# Patient Record
Sex: MALE | Race: ASIAN | NOT HISPANIC OR LATINO | Employment: OTHER | URBAN - METROPOLITAN AREA
[De-identification: names, ages, dates, MRNs, and addresses within clinical notes are randomized per-mention and may not be internally consistent; named-entity substitution may affect disease eponyms.]

---

## 2023-03-10 ENCOUNTER — APPOINTMENT (OUTPATIENT)
Dept: LAB | Facility: LAB | Age: 83
End: 2023-03-10
Payer: MEDICARE

## 2023-03-11 LAB
ALBUMIN (G/DL) IN SER/PLAS: 4.2 G/DL (ref 3.4–5)
ALBUMIN (MG/L) IN URINE: 19.1 MG/L
ALBUMIN/CREATININE (UG/MG) IN URINE: 36.7 UG/MG CRT (ref 0–30)
ANION GAP IN SER/PLAS: 12 MMOL/L (ref 10–20)
APPEARANCE, URINE: CLEAR
BILIRUBIN, URINE: NEGATIVE
BLOOD, URINE: NEGATIVE
CALCIUM (MG/DL) IN SER/PLAS: 9.2 MG/DL (ref 8.6–10.6)
CARBON DIOXIDE, TOTAL (MMOL/L) IN SER/PLAS: 27 MMOL/L (ref 21–32)
CHLORIDE (MMOL/L) IN SER/PLAS: 107 MMOL/L (ref 98–107)
COLOR, URINE: NORMAL
CREATININE (MG/DL) IN SER/PLAS: 1.47 MG/DL (ref 0.5–1.3)
CREATININE (MG/DL) IN URINE: 52.1 MG/DL (ref 20–370)
CREATININE (MG/DL) IN URINE: 52.1 MG/DL (ref 20–370)
ERYTHROCYTE DISTRIBUTION WIDTH (RATIO) BY AUTOMATED COUNT: 13.2 % (ref 11.5–14.5)
ERYTHROCYTE MEAN CORPUSCULAR HEMOGLOBIN CONCENTRATION (G/DL) BY AUTOMATED: 32.2 G/DL (ref 32–36)
ERYTHROCYTE MEAN CORPUSCULAR VOLUME (FL) BY AUTOMATED COUNT: 92 FL (ref 80–100)
ERYTHROCYTES (10*6/UL) IN BLOOD BY AUTOMATED COUNT: 3.56 X10E12/L (ref 4.5–5.9)
GFR MALE: 47 ML/MIN/1.73M2
GLUCOSE (MG/DL) IN SER/PLAS: 86 MG/DL (ref 74–99)
GLUCOSE, URINE: NEGATIVE MG/DL
HEMATOCRIT (%) IN BLOOD BY AUTOMATED COUNT: 32.9 % (ref 41–52)
HEMOGLOBIN (G/DL) IN BLOOD: 10.6 G/DL (ref 13.5–17.5)
KETONES, URINE: NEGATIVE MG/DL
LEUKOCYTE ESTERASE, URINE: NEGATIVE
LEUKOCYTES (10*3/UL) IN BLOOD BY AUTOMATED COUNT: 5.3 X10E9/L (ref 4.4–11.3)
NITRITE, URINE: NEGATIVE
NRBC (PER 100 WBCS) BY AUTOMATED COUNT: 0 /100 WBC (ref 0–0)
PH, URINE: 5 (ref 5–8)
PHOSPHATE (MG/DL) IN SER/PLAS: 3.4 MG/DL (ref 2.5–4.9)
PLATELETS (10*3/UL) IN BLOOD AUTOMATED COUNT: 167 X10E9/L (ref 150–450)
POTASSIUM (MMOL/L) IN SER/PLAS: 5.4 MMOL/L (ref 3.5–5.3)
PROTEIN (MG/DL) IN URINE: 7 MG/DL (ref 5–25)
PROTEIN, URINE: NEGATIVE MG/DL
PROTEIN/CREATININE (MG/MG) IN URINE: 0.13 MG/MG CREAT (ref 0–0.17)
SODIUM (MMOL/L) IN SER/PLAS: 141 MMOL/L (ref 136–145)
SPECIFIC GRAVITY, URINE: 1.01 (ref 1–1.03)
UREA NITROGEN (MG/DL) IN SER/PLAS: 28 MG/DL (ref 6–23)
UROBILINOGEN, URINE: <2 MG/DL (ref 0–1.9)

## 2023-03-12 LAB
IMMUNOGLOBULIN LIGHT CHAINS KAPPA/LAMBDA (MASS RATIO) IN SERUM: 1.8 (ref 0.26–1.65)
IMMUNOGLOBULIN LIGHT CHAINS.KAPPA (MG/DL) IN SERUM: 4.86 MG/DL (ref 0.33–1.94)
IMMUNOGLOBULIN LIGHT CHAINS.LAMBDA (MG/DL) IN SERUM: 2.7 MG/DL (ref 0.57–2.63)

## 2023-03-15 LAB
ALBUMIN ELP: 4.1 G/DL (ref 3.4–5)
ALPHA 1: 0.3 G/DL (ref 0.2–0.6)
ALPHA 2: 0.7 G/DL (ref 0.4–1.1)
BETA: 0.6 G/DL (ref 0.5–1.2)
GAMMA GLOBULIN: 1.1 G/DL (ref 0.5–1.4)
PATH REVIEW - SERUM IMMUNOFIXATION: NORMAL
PATH REVIEW-SERUM PROTEIN ELECTROPHORESIS: NORMAL
PROTEIN ELECTROPHORESIS INTERPRETATION: NORMAL
PROTEIN TOTAL: 6.8 G/DL (ref 6.4–8.2)
SERUM IMMUNOFIXATION INTERPRETATION: NORMAL

## 2023-03-20 RX ORDER — NEBIVOLOL 5 MG/1
0.5 TABLET ORAL DAILY
COMMUNITY
Start: 2014-10-09 | End: 2023-10-05 | Stop reason: ALTCHOICE

## 2023-03-20 RX ORDER — ROSUVASTATIN CALCIUM 10 MG/1
TABLET, COATED ORAL
COMMUNITY
Start: 2016-08-22 | End: 2023-10-02 | Stop reason: SINTOL

## 2023-03-20 RX ORDER — BUTALBITAL, ACETAMINOPHEN AND CAFFEINE 50; 325; 40 MG/1; MG/1; MG/1
1 TABLET ORAL EVERY 4 HOURS PRN
COMMUNITY
Start: 2014-03-05 | End: 2023-07-11

## 2023-03-20 RX ORDER — HYDROCORTISONE 25 MG/G
CREAM TOPICAL
COMMUNITY
Start: 2022-03-02

## 2023-03-20 RX ORDER — TAMSULOSIN HYDROCHLORIDE 0.4 MG/1
1 CAPSULE ORAL DAILY
COMMUNITY
Start: 2019-03-12 | End: 2023-06-01 | Stop reason: SDUPTHER

## 2023-03-20 RX ORDER — PREGABALIN 25 MG/1
CAPSULE ORAL
COMMUNITY
Start: 2022-09-13 | End: 2023-10-02

## 2023-03-20 RX ORDER — BUTALBITAL, ACETAMINOPHEN AND CAFFEINE 300; 40; 50 MG/1; MG/1; MG/1
1 CAPSULE ORAL 2 TIMES DAILY
COMMUNITY
Start: 2023-02-28 | End: 2023-09-07 | Stop reason: SDUPTHER

## 2023-03-20 RX ORDER — ICOSAPENT ETHYL 1 G/1
CAPSULE ORAL
COMMUNITY
Start: 2017-10-16 | End: 2023-10-02 | Stop reason: SDUPTHER

## 2023-03-20 RX ORDER — PANTOPRAZOLE SODIUM 20 MG/1
1 TABLET, DELAYED RELEASE ORAL DAILY
COMMUNITY
Start: 2022-12-22

## 2023-03-20 RX ORDER — AZELASTINE 1 MG/ML
2 SPRAY, METERED NASAL 2 TIMES DAILY
COMMUNITY
Start: 2020-10-08

## 2023-03-20 RX ORDER — CLOPIDOGREL BISULFATE 75 MG/1
1 TABLET ORAL DAILY
COMMUNITY
Start: 2022-12-16

## 2023-04-10 LAB
ALBUMIN (G/DL) IN SER/PLAS: 4 G/DL (ref 3.4–5)
ANION GAP IN SER/PLAS: 12 MMOL/L (ref 10–20)
CALCIUM (MG/DL) IN SER/PLAS: 9.2 MG/DL (ref 8.6–10.3)
CARBON DIOXIDE, TOTAL (MMOL/L) IN SER/PLAS: 25 MMOL/L (ref 21–32)
CHLORIDE (MMOL/L) IN SER/PLAS: 108 MMOL/L (ref 98–107)
CREATININE (MG/DL) IN SER/PLAS: 1.53 MG/DL (ref 0.5–1.3)
GFR MALE: 45 ML/MIN/1.73M2
GLUCOSE (MG/DL) IN SER/PLAS: 89 MG/DL (ref 74–99)
PHOSPHATE (MG/DL) IN SER/PLAS: 3.5 MG/DL (ref 2.5–4.9)
POTASSIUM (MMOL/L) IN SER/PLAS: 4.5 MMOL/L (ref 3.5–5.3)
SODIUM (MMOL/L) IN SER/PLAS: 140 MMOL/L (ref 136–145)
UREA NITROGEN (MG/DL) IN SER/PLAS: 30 MG/DL (ref 6–23)

## 2023-05-31 PROBLEM — K21.9 GASTROESOPHAGEAL REFLUX DISEASE WITHOUT ESOPHAGITIS: Status: ACTIVE | Noted: 2023-05-31

## 2023-05-31 PROBLEM — C61 PROSTATE CANCER (MULTI): Status: ACTIVE | Noted: 2023-05-31

## 2023-05-31 PROBLEM — I25.10 CORONARY ARTERY DISEASE INVOLVING NATIVE CORONARY ARTERY OF NATIVE HEART WITHOUT ANGINA PECTORIS: Status: ACTIVE | Noted: 2023-05-31

## 2023-05-31 PROBLEM — I65.29 CAROTID STENOSIS: Status: ACTIVE | Noted: 2023-05-31

## 2023-05-31 PROBLEM — N18.30 STAGE 3 CHRONIC KIDNEY DISEASE (MULTI): Status: ACTIVE | Noted: 2023-05-31

## 2023-05-31 PROBLEM — I15.0 RENOVASCULAR HYPERTENSION: Status: ACTIVE | Noted: 2023-05-31

## 2023-05-31 PROBLEM — G43.909 MIGRAINE: Status: ACTIVE | Noted: 2023-05-31

## 2023-06-01 ENCOUNTER — OFFICE VISIT (OUTPATIENT)
Dept: PRIMARY CARE | Facility: CLINIC | Age: 83
End: 2023-06-01
Payer: MEDICARE

## 2023-06-01 VITALS
SYSTOLIC BLOOD PRESSURE: 112 MMHG | WEIGHT: 199.6 LBS | BODY MASS INDEX: 27.84 KG/M2 | DIASTOLIC BLOOD PRESSURE: 70 MMHG | TEMPERATURE: 97.5 F

## 2023-06-01 DIAGNOSIS — G43.C0 PERIODIC HEADACHE SYNDROME, NOT INTRACTABLE: ICD-10-CM

## 2023-06-01 DIAGNOSIS — K21.9 GASTROESOPHAGEAL REFLUX DISEASE WITHOUT ESOPHAGITIS: ICD-10-CM

## 2023-06-01 DIAGNOSIS — R35.0 URINARY FREQUENCY: Primary | ICD-10-CM

## 2023-06-01 DIAGNOSIS — I15.0 RENOVASCULAR HYPERTENSION: ICD-10-CM

## 2023-06-01 DIAGNOSIS — I25.10 CORONARY ARTERY DISEASE INVOLVING NATIVE CORONARY ARTERY OF NATIVE HEART WITHOUT ANGINA PECTORIS: ICD-10-CM

## 2023-06-01 DIAGNOSIS — C61 PROSTATE CANCER (MULTI): ICD-10-CM

## 2023-06-01 DIAGNOSIS — I65.21 STENOSIS OF RIGHT CAROTID ARTERY: ICD-10-CM

## 2023-06-01 DIAGNOSIS — N18.31 STAGE 3A CHRONIC KIDNEY DISEASE (MULTI): ICD-10-CM

## 2023-06-01 PROCEDURE — 1159F MED LIST DOCD IN RCRD: CPT | Performed by: FAMILY MEDICINE

## 2023-06-01 PROCEDURE — 3074F SYST BP LT 130 MM HG: CPT | Performed by: FAMILY MEDICINE

## 2023-06-01 PROCEDURE — 99213 OFFICE O/P EST LOW 20 MIN: CPT | Performed by: FAMILY MEDICINE

## 2023-06-01 PROCEDURE — 1036F TOBACCO NON-USER: CPT | Performed by: FAMILY MEDICINE

## 2023-06-01 PROCEDURE — 3078F DIAST BP <80 MM HG: CPT | Performed by: FAMILY MEDICINE

## 2023-06-01 RX ORDER — VIT C/E/ZN/COPPR/LUTEIN/ZEAXAN 250MG-90MG
1000 CAPSULE ORAL
COMMUNITY

## 2023-06-01 RX ORDER — GABAPENTIN 100 MG/1
100 CAPSULE ORAL
COMMUNITY
Start: 2023-02-06 | End: 2023-10-31 | Stop reason: SDUPTHER

## 2023-06-01 RX ORDER — PITAVASTATIN CALCIUM 2.09 MG/1
1 TABLET, FILM COATED ORAL DAILY
COMMUNITY
Start: 2023-02-14 | End: 2024-01-30

## 2023-06-01 RX ORDER — MULTIVITAMIN
TABLET ORAL
COMMUNITY
Start: 2004-03-19

## 2023-06-01 RX ORDER — CALCIUM CARBONATE 500(1250)
1 TABLET ORAL
COMMUNITY

## 2023-06-01 RX ORDER — TAMSULOSIN HYDROCHLORIDE 0.4 MG/1
0.4 CAPSULE ORAL DAILY
Qty: 90 CAPSULE | Refills: 3 | Status: SHIPPED | OUTPATIENT
Start: 2023-06-01 | End: 2023-09-05

## 2023-06-01 ASSESSMENT — PATIENT HEALTH QUESTIONNAIRE - PHQ9
SUM OF ALL RESPONSES TO PHQ9 QUESTIONS 1 AND 2: 0
1. LITTLE INTEREST OR PLEASURE IN DOING THINGS: NOT AT ALL
2. FEELING DOWN, DEPRESSED OR HOPELESS: NOT AT ALL

## 2023-06-01 NOTE — PROGRESS NOTES
Subjective   Patient ID: Arcadio Han is a 82 y.o. male who presents for No chief complaint on file..    We are following him for prostate cancer coronary artery disease, GERD, chronic kidney disease, hypertension and migraines.  In general he is getting along pretty well his blood pressure is fine.  His last 2 PSAs were undetectable.  He is having side effects from the chemotherapy but is tolerating things.  He gets regular blood work through other specialist so we will not do anything today.         Review of Systems    Objective   /70   Temp 36.4 °C (97.5 °F)   Wt 90.5 kg (199 lb 9.6 oz)   BMI 27.84 kg/m²     Physical Exam  Constitutional:       Appearance: Normal appearance.   HENT:      Head: Normocephalic and atraumatic.      Nose: Nose normal.   Cardiovascular:      Rate and Rhythm: Normal rate and regular rhythm.   Pulmonary:      Effort: Pulmonary effort is normal.      Breath sounds: Normal breath sounds.   Musculoskeletal:      Cervical back: Normal range of motion and neck supple.   Neurological:      General: No focal deficit present.      Mental Status: He is alert and oriented to person, place, and time.   Psychiatric:         Mood and Affect: Mood normal.         Behavior: Behavior normal.         Thought Content: Thought content normal.         Judgment: Judgment normal.         Assessment/Plan   Problem List Items Addressed This Visit       Prostate cancer (CMS/HCC)     Responding to hormonal therapy         Coronary artery disease involving native coronary artery of native heart without angina pectoris    Carotid stenosis    Gastroesophageal reflux disease without esophagitis    Stage 3 chronic kidney disease (CMS/HCC)     Followed by nephrology         Renovascular hypertension    Migraine     Other Visit Diagnoses       Urinary frequency    -  Primary    Relevant Medications    tamsulosin (Flomax) 0.4 mg 24 hr capsule        He is doing really pretty well considering the side effects  from chemo and I will follow-up with him in 3 months.

## 2023-06-22 LAB
ALBUMIN (G/DL) IN SER/PLAS: 4.6 G/DL (ref 3.4–5)
ALBUMIN (MG/L) IN URINE: <7 MG/L
ALBUMIN/CREATININE (UG/MG) IN URINE: NORMAL UG/MG CRT (ref 0–30)
ANION GAP IN SER/PLAS: 17 MMOL/L (ref 10–20)
APPEARANCE, URINE: CLEAR
BILIRUBIN, URINE: NEGATIVE
BLOOD, URINE: NEGATIVE
CALCIDIOL (25 OH VITAMIN D3) (NG/ML) IN SER/PLAS: 34 NG/ML
CALCIUM (MG/DL) IN SER/PLAS: 9.3 MG/DL (ref 8.6–10.6)
CARBON DIOXIDE, TOTAL (MMOL/L) IN SER/PLAS: 23 MMOL/L (ref 21–32)
CHLORIDE (MMOL/L) IN SER/PLAS: 106 MMOL/L (ref 98–107)
COLOR, URINE: YELLOW
CREATININE (MG/DL) IN SER/PLAS: 1.86 MG/DL (ref 0.5–1.3)
CREATININE (MG/DL) IN URINE: 74.4 MG/DL (ref 20–370)
CREATININE (MG/DL) IN URINE: 74.4 MG/DL (ref 20–370)
GFR MALE: 36 ML/MIN/1.73M2
GLUCOSE (MG/DL) IN SER/PLAS: 94 MG/DL (ref 74–99)
GLUCOSE, URINE: NEGATIVE MG/DL
KETONES, URINE: NEGATIVE MG/DL
LEUKOCYTE ESTERASE, URINE: NEGATIVE
NITRITE, URINE: NEGATIVE
PARATHYRIN INTACT (PG/ML) IN SER/PLAS: 91.6 PG/ML (ref 18.5–88)
PH, URINE: 5 (ref 5–8)
PHOSPHATE (MG/DL) IN SER/PLAS: 3.6 MG/DL (ref 2.5–4.9)
POTASSIUM (MMOL/L) IN SER/PLAS: 4.8 MMOL/L (ref 3.5–5.3)
PROTEIN (MG/DL) IN URINE: 10 MG/DL (ref 5–25)
PROTEIN, URINE: NEGATIVE MG/DL
PROTEIN/CREATININE (MG/MG) IN URINE: 0.13 MG/MG CREAT (ref 0–0.17)
SODIUM (MMOL/L) IN SER/PLAS: 141 MMOL/L (ref 136–145)
SPECIFIC GRAVITY, URINE: 1.01 (ref 1–1.03)
UREA NITROGEN (MG/DL) IN SER/PLAS: 39 MG/DL (ref 6–23)
UROBILINOGEN, URINE: <2 MG/DL (ref 0–1.9)

## 2023-07-10 DIAGNOSIS — G43.C0 PERIODIC HEADACHE SYNDROME, NOT INTRACTABLE: Primary | ICD-10-CM

## 2023-07-11 RX ORDER — BUTALBITAL, ACETAMINOPHEN AND CAFFEINE 50; 325; 40 MG/1; MG/1; MG/1
TABLET ORAL
Qty: 120 TABLET | Refills: 0 | Status: SHIPPED | OUTPATIENT
Start: 2023-07-11 | End: 2023-08-14

## 2023-07-17 LAB
ERYTHROCYTE DISTRIBUTION WIDTH (RATIO) BY AUTOMATED COUNT: 13.4 % (ref 11.5–14.5)
ERYTHROCYTE MEAN CORPUSCULAR HEMOGLOBIN CONCENTRATION (G/DL) BY AUTOMATED: 32.3 G/DL (ref 32–36)
ERYTHROCYTE MEAN CORPUSCULAR VOLUME (FL) BY AUTOMATED COUNT: 93 FL (ref 80–100)
ERYTHROCYTES (10*6/UL) IN BLOOD BY AUTOMATED COUNT: 3.57 X10E12/L (ref 4.5–5.9)
HEMATOCRIT (%) IN BLOOD BY AUTOMATED COUNT: 33.1 % (ref 41–52)
HEMOGLOBIN (G/DL) IN BLOOD: 10.7 G/DL (ref 13.5–17.5)
LEUKOCYTES (10*3/UL) IN BLOOD BY AUTOMATED COUNT: 5.4 X10E9/L (ref 4.4–11.3)
PLATELETS (10*3/UL) IN BLOOD AUTOMATED COUNT: 155 X10E9/L (ref 150–450)

## 2023-08-11 LAB
BASOPHILS (10*3/UL) IN BLOOD BY AUTOMATED COUNT: 0.02 X10E9/L (ref 0–0.1)
BASOPHILS/100 LEUKOCYTES IN BLOOD BY AUTOMATED COUNT: 0.4 % (ref 0–2)
EOSINOPHILS (10*3/UL) IN BLOOD BY AUTOMATED COUNT: 0.19 X10E9/L (ref 0–0.4)
EOSINOPHILS/100 LEUKOCYTES IN BLOOD BY AUTOMATED COUNT: 3.7 % (ref 0–6)
ERYTHROCYTE DISTRIBUTION WIDTH (RATIO) BY AUTOMATED COUNT: 13.2 % (ref 11.5–14.5)
ERYTHROCYTE MEAN CORPUSCULAR HEMOGLOBIN CONCENTRATION (G/DL) BY AUTOMATED: 32.6 G/DL (ref 32–36)
ERYTHROCYTE MEAN CORPUSCULAR VOLUME (FL) BY AUTOMATED COUNT: 92 FL (ref 80–100)
ERYTHROCYTES (10*6/UL) IN BLOOD BY AUTOMATED COUNT: 3.44 X10E12/L (ref 4.5–5.9)
FERRITIN (UG/LL) IN SER/PLAS: 97 UG/L (ref 20–300)
HEMATOCRIT (%) IN BLOOD BY AUTOMATED COUNT: 31.6 % (ref 41–52)
HEMOGLOBIN (G/DL) IN BLOOD: 10.3 G/DL (ref 13.5–17.5)
IMMATURE GRANULOCYTES/100 LEUKOCYTES IN BLOOD BY AUTOMATED COUNT: 0.4 % (ref 0–0.9)
IRON (UG/DL) IN SER/PLAS: 69 UG/DL (ref 35–150)
IRON BINDING CAPACITY (UG/DL) IN SER/PLAS: 231 UG/DL (ref 240–445)
IRON SATURATION (%) IN SER/PLAS: 30 % (ref 25–45)
LEUKOCYTES (10*3/UL) IN BLOOD BY AUTOMATED COUNT: 5.2 X10E9/L (ref 4.4–11.3)
LYMPHOCYTES (10*3/UL) IN BLOOD BY AUTOMATED COUNT: 1.14 X10E9/L (ref 0.8–3)
LYMPHOCYTES/100 LEUKOCYTES IN BLOOD BY AUTOMATED COUNT: 22.1 % (ref 13–44)
MONOCYTES (10*3/UL) IN BLOOD BY AUTOMATED COUNT: 0.52 X10E9/L (ref 0.05–0.8)
MONOCYTES/100 LEUKOCYTES IN BLOOD BY AUTOMATED COUNT: 10.1 % (ref 2–10)
NEUTROPHILS (10*3/UL) IN BLOOD BY AUTOMATED COUNT: 3.26 X10E9/L (ref 1.6–5.5)
NEUTROPHILS/100 LEUKOCYTES IN BLOOD BY AUTOMATED COUNT: 63.3 % (ref 40–80)
PLATELETS (10*3/UL) IN BLOOD AUTOMATED COUNT: 158 X10E9/L (ref 150–450)

## 2023-08-14 DIAGNOSIS — G43.C0 PERIODIC HEADACHE SYNDROME, NOT INTRACTABLE: ICD-10-CM

## 2023-08-14 RX ORDER — BUTALBITAL, ACETAMINOPHEN AND CAFFEINE 50; 325; 40 MG/1; MG/1; MG/1
TABLET ORAL
Qty: 120 TABLET | Refills: 0 | Status: SHIPPED
Start: 2023-08-14 | End: 2023-09-07 | Stop reason: SDUPTHER

## 2023-08-22 PROBLEM — I25.10 ATHEROSCLEROTIC HEART DISEASE OF NATIVE CORONARY ARTERY WITHOUT ANGINA PECTORIS: Status: ACTIVE | Noted: 2023-08-22

## 2023-08-22 PROBLEM — I65.21 CAROTID STENOSIS, ASYMPTOMATIC, RIGHT: Status: ACTIVE | Noted: 2023-08-22

## 2023-08-22 PROBLEM — D17.0 LIPOMA OF NECK: Status: ACTIVE | Noted: 2023-08-22

## 2023-08-22 PROBLEM — R09.89 BRUIT OF RIGHT CAROTID ARTERY: Status: ACTIVE | Noted: 2023-08-22

## 2023-08-22 PROBLEM — H93.19 TINNITUS: Status: ACTIVE | Noted: 2023-08-22

## 2023-08-22 PROBLEM — H60.339 SWIMMER'S EAR: Status: ACTIVE | Noted: 2023-08-22

## 2023-08-22 PROBLEM — I65.23 CAROTID ARTERY OBSTRUCTION, BILATERAL: Status: ACTIVE | Noted: 2023-08-22

## 2023-08-22 PROBLEM — E78.2 MIXED HYPERLIPIDEMIA: Status: ACTIVE | Noted: 2023-08-22

## 2023-08-22 PROBLEM — M48.02 SPINAL STENOSIS OF CERVICAL REGION WITH RADICULOPATHY: Status: ACTIVE | Noted: 2023-08-22

## 2023-08-22 PROBLEM — Z78.9 STATIN INTOLERANCE: Status: ACTIVE | Noted: 2023-08-22

## 2023-08-22 PROBLEM — I10 ESSENTIAL HYPERTENSION: Status: ACTIVE | Noted: 2023-08-22

## 2023-08-22 PROBLEM — G43.109 MIGRAINE WITH AURA AND WITHOUT STATUS MIGRAINOSUS, NOT INTRACTABLE: Status: ACTIVE | Noted: 2023-08-22

## 2023-08-22 PROBLEM — E04.2 MULTIPLE THYROID NODULES: Status: ACTIVE | Noted: 2023-08-22

## 2023-08-22 PROBLEM — K63.5 POLYP OF CECUM: Status: ACTIVE | Noted: 2019-01-14

## 2023-08-22 PROBLEM — E66.3 OVERWEIGHT WITH BODY MASS INDEX (BMI) OF 26 TO 26.9 IN ADULT: Status: ACTIVE | Noted: 2023-08-22

## 2023-08-22 PROBLEM — R35.1 BPH ASSOCIATED WITH NOCTURIA: Status: ACTIVE | Noted: 2023-08-22

## 2023-08-22 PROBLEM — E04.1 LEFT THYROID NODULE: Status: ACTIVE | Noted: 2023-08-22

## 2023-08-22 PROBLEM — C61 ADENOCARCINOMA OF PROSTATE (MULTI): Status: ACTIVE | Noted: 2023-08-22

## 2023-08-22 PROBLEM — N40.1 BPH ASSOCIATED WITH NOCTURIA: Status: ACTIVE | Noted: 2023-08-22

## 2023-08-22 PROBLEM — M10.9 GOUT: Status: ACTIVE | Noted: 2023-08-22

## 2023-08-22 PROBLEM — G56.01 CARPAL TUNNEL SYNDROME OF RIGHT WRIST: Status: ACTIVE | Noted: 2023-08-22

## 2023-08-22 PROBLEM — M54.12 SPINAL STENOSIS OF CERVICAL REGION WITH RADICULOPATHY: Status: ACTIVE | Noted: 2023-08-22

## 2023-08-22 PROBLEM — I63.9 STROKE (MULTI): Status: ACTIVE | Noted: 2023-08-22

## 2023-08-22 PROBLEM — C44.92 SCC (SQUAMOUS CELL CARCINOMA): Status: ACTIVE | Noted: 2023-08-22

## 2023-08-22 PROBLEM — R79.89 ELEVATED SERUM CREATININE: Status: ACTIVE | Noted: 2023-08-22

## 2023-08-22 RX ORDER — OMEPRAZOLE 20 MG/1
20 TABLET, DELAYED RELEASE ORAL EVERY MORNING
COMMUNITY
End: 2023-10-02

## 2023-08-22 RX ORDER — DAPAGLIFLOZIN 5 MG/1
1 TABLET, FILM COATED ORAL EVERY MORNING
COMMUNITY
Start: 2023-07-21 | End: 2023-10-02 | Stop reason: SDUPTHER

## 2023-08-22 RX ORDER — ROSUVASTATIN CALCIUM 5 MG/1
5 TABLET, COATED ORAL EVERY MORNING
COMMUNITY
End: 2023-10-02 | Stop reason: SINTOL

## 2023-08-22 RX ORDER — ICOSAPENT ETHYL 1 G/1
2 CAPSULE ORAL
COMMUNITY
Start: 2023-01-09 | End: 2024-01-30

## 2023-08-22 RX ORDER — NEBIVOLOL 2.5 MG/1
2.5 TABLET ORAL EVERY MORNING
COMMUNITY
End: 2023-10-05 | Stop reason: ALTCHOICE

## 2023-08-22 RX ORDER — FAMOTIDINE 40 MG/1
1 TABLET, FILM COATED ORAL NIGHTLY
COMMUNITY
End: 2023-10-05

## 2023-09-03 PROBLEM — I65.21 CAROTID STENOSIS, ASYMPTOMATIC, RIGHT: Status: RESOLVED | Noted: 2023-08-22 | Resolved: 2023-09-03

## 2023-09-03 PROBLEM — I65.23 CAROTID ARTERY OBSTRUCTION, BILATERAL: Status: RESOLVED | Noted: 2023-08-22 | Resolved: 2023-09-03

## 2023-09-03 PROBLEM — K63.5 POLYP OF CECUM: Status: RESOLVED | Noted: 2019-01-14 | Resolved: 2023-09-03

## 2023-09-03 PROBLEM — I65.29 CAROTID STENOSIS: Status: RESOLVED | Noted: 2023-05-31 | Resolved: 2023-09-03

## 2023-09-03 PROBLEM — I25.10 ATHEROSCLEROTIC HEART DISEASE OF NATIVE CORONARY ARTERY WITHOUT ANGINA PECTORIS: Status: RESOLVED | Noted: 2023-08-22 | Resolved: 2023-09-03

## 2023-09-03 PROBLEM — R79.89 ELEVATED SERUM CREATININE: Status: RESOLVED | Noted: 2023-08-22 | Resolved: 2023-09-03

## 2023-09-03 PROBLEM — R09.89 BRUIT OF RIGHT CAROTID ARTERY: Status: RESOLVED | Noted: 2023-08-22 | Resolved: 2023-09-03

## 2023-09-03 PROBLEM — H60.339 SWIMMER'S EAR: Status: RESOLVED | Noted: 2023-08-22 | Resolved: 2023-09-03

## 2023-09-05 ENCOUNTER — OFFICE VISIT (OUTPATIENT)
Dept: PRIMARY CARE | Facility: CLINIC | Age: 83
End: 2023-09-05
Payer: MEDICARE

## 2023-09-05 ENCOUNTER — LAB (OUTPATIENT)
Dept: LAB | Facility: LAB | Age: 83
End: 2023-09-05
Payer: MEDICARE

## 2023-09-05 VITALS
WEIGHT: 195 LBS | BODY MASS INDEX: 28.88 KG/M2 | SYSTOLIC BLOOD PRESSURE: 142 MMHG | HEIGHT: 69 IN | OXYGEN SATURATION: 96 % | HEART RATE: 70 BPM | DIASTOLIC BLOOD PRESSURE: 84 MMHG | TEMPERATURE: 97.3 F

## 2023-09-05 DIAGNOSIS — M54.12 SPINAL STENOSIS OF CERVICAL REGION WITH RADICULOPATHY: ICD-10-CM

## 2023-09-05 DIAGNOSIS — C61 PROSTATE CANCER (MULTI): ICD-10-CM

## 2023-09-05 DIAGNOSIS — K21.9 GASTROESOPHAGEAL REFLUX DISEASE WITHOUT ESOPHAGITIS: ICD-10-CM

## 2023-09-05 DIAGNOSIS — M10.9 GOUT, UNSPECIFIED CAUSE, UNSPECIFIED CHRONICITY, UNSPECIFIED SITE: ICD-10-CM

## 2023-09-05 DIAGNOSIS — I25.10 CORONARY ARTERY DISEASE INVOLVING NATIVE CORONARY ARTERY OF NATIVE HEART WITHOUT ANGINA PECTORIS: ICD-10-CM

## 2023-09-05 DIAGNOSIS — I15.0 RENOVASCULAR HYPERTENSION: ICD-10-CM

## 2023-09-05 DIAGNOSIS — E78.2 MIXED HYPERLIPIDEMIA: ICD-10-CM

## 2023-09-05 DIAGNOSIS — I63.40 CEREBROVASCULAR ACCIDENT (CVA) DUE TO EMBOLISM OF CEREBRAL ARTERY (MULTI): ICD-10-CM

## 2023-09-05 DIAGNOSIS — Z78.9 STATIN INTOLERANCE: ICD-10-CM

## 2023-09-05 DIAGNOSIS — M48.02 SPINAL STENOSIS OF CERVICAL REGION WITH RADICULOPATHY: ICD-10-CM

## 2023-09-05 DIAGNOSIS — I65.21 CAROTID STENOSIS, ASYMPTOMATIC, RIGHT: ICD-10-CM

## 2023-09-05 DIAGNOSIS — I10 ESSENTIAL HYPERTENSION: ICD-10-CM

## 2023-09-05 DIAGNOSIS — E04.2 MULTIPLE THYROID NODULES: ICD-10-CM

## 2023-09-05 DIAGNOSIS — Z00.00 ROUTINE GENERAL MEDICAL EXAMINATION AT HEALTH CARE FACILITY: ICD-10-CM

## 2023-09-05 DIAGNOSIS — Z00.00 PHYSICAL EXAM: ICD-10-CM

## 2023-09-05 DIAGNOSIS — G43.109 MIGRAINE WITH AURA AND WITHOUT STATUS MIGRAINOSUS, NOT INTRACTABLE: ICD-10-CM

## 2023-09-05 DIAGNOSIS — Z00.00 MEDICARE ANNUAL WELLNESS VISIT, SUBSEQUENT: Primary | ICD-10-CM

## 2023-09-05 DIAGNOSIS — N18.31 STAGE 3A CHRONIC KIDNEY DISEASE (MULTI): ICD-10-CM

## 2023-09-05 LAB
CHOLESTEROL (MG/DL) IN SER/PLAS: 127 MG/DL (ref 0–199)
CHOLESTEROL IN HDL (MG/DL) IN SER/PLAS: 36.4 MG/DL
CHOLESTEROL/HDL RATIO: 3.5
LDL: 51 MG/DL (ref 0–99)
TRIGLYCERIDE (MG/DL) IN SER/PLAS: 196 MG/DL (ref 0–149)
URATE (MG/DL) IN SER/PLAS: 7.3 MG/DL (ref 4–7.5)
VLDL: 39 MG/DL (ref 0–40)

## 2023-09-05 PROCEDURE — G0439 PPPS, SUBSEQ VISIT: HCPCS | Performed by: FAMILY MEDICINE

## 2023-09-05 PROCEDURE — 80061 LIPID PANEL: CPT

## 2023-09-05 PROCEDURE — 3079F DIAST BP 80-89 MM HG: CPT | Performed by: FAMILY MEDICINE

## 2023-09-05 PROCEDURE — 1170F FXNL STATUS ASSESSED: CPT | Performed by: FAMILY MEDICINE

## 2023-09-05 PROCEDURE — 1126F AMNT PAIN NOTED NONE PRSNT: CPT | Performed by: FAMILY MEDICINE

## 2023-09-05 PROCEDURE — 99213 OFFICE O/P EST LOW 20 MIN: CPT | Performed by: FAMILY MEDICINE

## 2023-09-05 PROCEDURE — 3077F SYST BP >= 140 MM HG: CPT | Performed by: FAMILY MEDICINE

## 2023-09-05 PROCEDURE — 84550 ASSAY OF BLOOD/URIC ACID: CPT

## 2023-09-05 PROCEDURE — 36415 COLL VENOUS BLD VENIPUNCTURE: CPT

## 2023-09-05 PROCEDURE — 1159F MED LIST DOCD IN RCRD: CPT | Performed by: FAMILY MEDICINE

## 2023-09-05 PROCEDURE — 99397 PER PM REEVAL EST PAT 65+ YR: CPT | Performed by: FAMILY MEDICINE

## 2023-09-05 PROCEDURE — 1036F TOBACCO NON-USER: CPT | Performed by: FAMILY MEDICINE

## 2023-09-05 ASSESSMENT — ENCOUNTER SYMPTOMS
LOSS OF SENSATION IN FEET: 0
OCCASIONAL FEELINGS OF UNSTEADINESS: 1

## 2023-09-05 ASSESSMENT — ACTIVITIES OF DAILY LIVING (ADL)
TAKING_MEDICATION: INDEPENDENT
DOING_HOUSEWORK: INDEPENDENT
MANAGING_FINANCES: INDEPENDENT
GROCERY_SHOPPING: INDEPENDENT
BATHING: INDEPENDENT
DRESSING: INDEPENDENT

## 2023-09-05 ASSESSMENT — PATIENT HEALTH QUESTIONNAIRE - PHQ9
SUM OF ALL RESPONSES TO PHQ9 QUESTIONS 1 AND 2: 0
SUM OF ALL RESPONSES TO PHQ9 QUESTIONS 1 AND 2: 0
1. LITTLE INTEREST OR PLEASURE IN DOING THINGS: NOT AT ALL
2. FEELING DOWN, DEPRESSED OR HOPELESS: NOT AT ALL
2. FEELING DOWN, DEPRESSED OR HOPELESS: NOT AT ALL
1. LITTLE INTEREST OR PLEASURE IN DOING THINGS: NOT AT ALL

## 2023-09-05 NOTE — PROGRESS NOTES
"Subjective   Reason for Visit: Dago Han is an 82 y.o. male here for a Medicare Wellness visit.          Reviewed all medications by prescribing practitioner or clinical pharmacist (such as prescriptions, OTCs, herbal therapies and supplements) and documented in the medical record.    He is here today for Medicare wellness visit, general checkup and he is being followed for coronary artery disease, prostate cancer, GERD, stage III chronic kidney disease, hypertension, migraines, spinal stenosis in the cervical region with no current radiculopathy.,  Gout, and statin intolerance.  In general, he has been tolerating the chemotherapy for his prostate cancer.        Patient Care Team:  Jose Noguera MD as PCP - General  Jose Noguera MD as PCP - [employer] PCP         Objective   Vitals:  /84 (BP Location: Left arm, Patient Position: Sitting, BP Cuff Size: Adult)   Pulse 70   Temp 36.3 °C (97.3 °F) (Skin)   Ht 1.753 m (5' 9\")   Wt 88.5 kg (195 lb)   SpO2 96%   BMI 28.80 kg/m²       Physical Exam  Constitutional:       Appearance: Normal appearance.   HENT:      Head: Normocephalic and atraumatic.      Nose: Nose normal.   Cardiovascular:      Rate and Rhythm: Normal rate and regular rhythm.   Pulmonary:      Effort: Pulmonary effort is normal.      Breath sounds: Normal breath sounds.   Musculoskeletal:      Cervical back: Normal range of motion and neck supple.   Neurological:      General: No focal deficit present.      Mental Status: He is alert and oriented to person, place, and time.   Psychiatric:         Mood and Affect: Mood normal.         Behavior: Behavior normal.         Thought Content: Thought content normal.         Judgment: Judgment normal.         Assessment/Plan   Problem List Items Addressed This Visit       Prostate cancer (CMS/[facility])    Current Assessment & Plan     Controlled and followed by urology         Coronary artery disease involving native " coronary artery of native heart without angina pectoris    Gastroesophageal reflux disease without esophagitis    Stage 3 chronic kidney disease (CMS/[facility])    Renovascular hypertension    Migraine with aura and without status migrainosus, not intractable    RESOLVED: Carotid stenosis, asymptomatic, right    Stroke (CMS/[facility])    Current Assessment & Plan     Fully recovered and had successful carotid endarterectomy         Multiple thyroid nodules    Spinal stenosis of cervical region with radiculopathy    Mixed hyperlipidemia    Relevant Orders    Lipid Panel    Gout    Relevant Orders    Uric acid    Essential hypertension    Statin intolerance     Other Visit Diagnoses       Medicare annual wellness visit, subsequent    -  Primary    Physical exam        Routine general medical examination at health care facility            I am rechecking his lipid panel and his uric acid level today and he will follow-up in 3 to 4 months as I will be retiring in 4 weeks.

## 2023-09-07 DIAGNOSIS — G43.C0 PERIODIC HEADACHE SYNDROME, NOT INTRACTABLE: ICD-10-CM

## 2023-09-07 RX ORDER — BUTALBITAL, ACETAMINOPHEN AND CAFFEINE 300; 40; 50 MG/1; MG/1; MG/1
1 CAPSULE ORAL 2 TIMES DAILY
Qty: 60 CAPSULE | Refills: 2 | Status: SHIPPED
Start: 2023-09-07 | End: 2023-09-08

## 2023-09-08 ENCOUNTER — TELEPHONE (OUTPATIENT)
Dept: PRIMARY CARE | Facility: CLINIC | Age: 83
End: 2023-09-08
Payer: MEDICARE

## 2023-09-08 DIAGNOSIS — G43.C0 PERIODIC HEADACHE SYNDROME, NOT INTRACTABLE: ICD-10-CM

## 2023-09-08 RX ORDER — BUTALBITAL, ACETAMINOPHEN AND CAFFEINE 50; 325; 40 MG/1; MG/1; MG/1
TABLET ORAL
Qty: 120 TABLET | Refills: 0 | Status: SHIPPED | OUTPATIENT
Start: 2023-09-08 | End: 2023-10-17

## 2023-09-08 NOTE — TELEPHONE ENCOUNTER
[facility] left a msg stating that the pt was prescribed Butalbital -40 mg capsules, and these will need a prior auth done.  He is asking that he be put back on the Butalbital -40 mg  tablets, that he was on before.  Pharm said they are covered by the insurance.

## 2023-09-28 ENCOUNTER — TELEPHONE (OUTPATIENT)
Dept: PRIMARY CARE | Facility: CLINIC | Age: 83
End: 2023-09-28
Payer: MEDICARE

## 2023-09-28 DIAGNOSIS — J01.90 ACUTE NON-RECURRENT SINUSITIS, UNSPECIFIED LOCATION: Primary | ICD-10-CM

## 2023-09-28 RX ORDER — AMOXICILLIN AND CLAVULANATE POTASSIUM 875; 125 MG/1; MG/1
875 TABLET, FILM COATED ORAL 2 TIMES DAILY
Qty: 10 TABLET | Refills: 0 | Status: SHIPPED | OUTPATIENT
Start: 2023-09-28

## 2023-09-28 NOTE — TELEPHONE ENCOUNTER
This Dr. Casanova pt left a msg stating that while he was in [place] he contracted Pneumonia.  He said he had fluid drained, received a shot, and was given Augmentin for 5 days.  He was told that when he goes home to ask his PCP  for 10 more days of the Augmentin.

## 2023-10-02 ENCOUNTER — OFFICE VISIT (OUTPATIENT)
Dept: PRIMARY CARE | Facility: CLINIC | Age: 83
End: 2023-10-02
Payer: MEDICARE

## 2023-10-02 VITALS
WEIGHT: 193.8 LBS | OXYGEN SATURATION: 97 % | TEMPERATURE: 97.3 F | HEART RATE: 77 BPM | SYSTOLIC BLOOD PRESSURE: 152 MMHG | BODY MASS INDEX: 28.62 KG/M2 | DIASTOLIC BLOOD PRESSURE: 72 MMHG

## 2023-10-02 DIAGNOSIS — J01.90 ACUTE NON-RECURRENT SINUSITIS, UNSPECIFIED LOCATION: Primary | ICD-10-CM

## 2023-10-02 PROCEDURE — 1036F TOBACCO NON-USER: CPT | Performed by: INTERNAL MEDICINE

## 2023-10-02 PROCEDURE — 1159F MED LIST DOCD IN RCRD: CPT | Performed by: INTERNAL MEDICINE

## 2023-10-02 PROCEDURE — 3078F DIAST BP <80 MM HG: CPT | Performed by: INTERNAL MEDICINE

## 2023-10-02 PROCEDURE — 99213 OFFICE O/P EST LOW 20 MIN: CPT | Performed by: INTERNAL MEDICINE

## 2023-10-02 PROCEDURE — 1126F AMNT PAIN NOTED NONE PRSNT: CPT | Performed by: INTERNAL MEDICINE

## 2023-10-02 PROCEDURE — 3077F SYST BP >= 140 MM HG: CPT | Performed by: INTERNAL MEDICINE

## 2023-10-02 RX ORDER — TAMSULOSIN HYDROCHLORIDE 0.4 MG/1
0.4 CAPSULE ORAL DAILY
COMMUNITY
Start: 2023-09-05

## 2023-10-02 RX ORDER — METHYLPREDNISOLONE SODIUM SUCCINATE 40 MG/ML
40 INJECTION INTRAMUSCULAR; INTRAVENOUS AS NEEDED
Status: CANCELLED | OUTPATIENT
Start: 2023-10-03

## 2023-10-02 RX ORDER — EPINEPHRINE 0.3 MG/.3ML
0.3 INJECTION SUBCUTANEOUS EVERY 5 MIN PRN
Status: CANCELLED | OUTPATIENT
Start: 2023-10-03

## 2023-10-02 RX ORDER — DIPHENHYDRAMINE HYDROCHLORIDE 50 MG/ML
50 INJECTION INTRAMUSCULAR; INTRAVENOUS AS NEEDED
Status: CANCELLED | OUTPATIENT
Start: 2023-10-03

## 2023-10-02 RX ORDER — BUMETANIDE 1 MG/1
1 TABLET ORAL AS NEEDED
COMMUNITY
Start: 2023-09-25

## 2023-10-02 RX ORDER — ALBUTEROL SULFATE 0.83 MG/ML
3 SOLUTION RESPIRATORY (INHALATION) AS NEEDED
Status: CANCELLED | OUTPATIENT
Start: 2023-10-03

## 2023-10-02 RX ORDER — FAMOTIDINE 10 MG/ML
20 INJECTION INTRAVENOUS ONCE AS NEEDED
Status: CANCELLED | OUTPATIENT
Start: 2023-10-03

## 2023-10-02 RX ORDER — LOSARTAN POTASSIUM 25 MG/1
25 TABLET ORAL DAILY
COMMUNITY
Start: 2023-09-25

## 2023-10-02 RX ORDER — AMOXICILLIN AND CLAVULANATE POTASSIUM 875; 125 MG/1; MG/1
875 TABLET, FILM COATED ORAL 2 TIMES DAILY
Qty: 10 TABLET | Refills: 0 | Status: SHIPPED | OUTPATIENT
Start: 2023-10-02 | End: 2023-10-07

## 2023-10-02 NOTE — PROGRESS NOTES
Subjective   Patient ID: [id number]   [facility]    Arcadio Han is a 83 y.o. male who presents for Pneumonia.He was in [facility] been treated for pneumonia with Augmentin for 7 days.He is getting better and feels perfectly fine. He is getting back to [place].        Objective   Visit Vitals  /72 (BP Location: Left arm, Patient Position: Sitting, BP Cuff Size: Adult)   Pulse 77   Temp 36.3 °C (97.3 °F) (Skin)      Review of Systems  All 12 systems reviewed, no other abnormality except that mentioned in HPI.    Physical Exam  Constitutional- no abnormality  ENT- no abnormality  Neck- no swelling  CVS- normal S1 and S2, no murmur.  Pulmonary- clear to auscultation,no rhonchi, no wheezes.  Abdomen- normal- liver and spleen, soft, no distension, bowel sound present.  Neurological- all cranial nerves intact, speech and gait normal, no sensory or motor deficiency.  Musculoskeletal- all pulses are normal, normal movement, no joint swelling.  Skin- no rash, dry.  psychiatry- no suicidal ideation.  Lymph node- no lymphadenopathy      Assessment/Plan   Problem List Items Addressed This Visit    None  Visit Diagnoses       Acute non-recurrent sinusitis, unspecified location    -  Primary    Relevant Medications    amoxicillin-pot clavulanate (Augmentin) 875-125 mg tablet            Susan Fuller MD

## 2023-10-02 NOTE — PROGRESS NOTES
"Patient ID: Arcadio Han is a 83 y.o. male.  Cancer History:   Treatment Synopsis:    Referring Provider  Rico Ahuja - neurologist  Cardiology - Alejandra Craig  ENT - Idania Garcia / nephrology / [facility]     Prostate Cancer - High Risk , Local Prostate  Treatment  -elevated PSA 11.18, MRI 2/2022 - 2.7x2.5 right prostate, EPE, SV invasion, no LAD, 3/2022 - BS - negative, Prostate biopsy 3/22 - multiple cores of Gr Gr 4 , PNI, refused PET PSMA  -ADT / Eligard 3m 4/19/22  - SBRT to prostate and seminal vesicles, treatment Dates: 05/27/2022-06/08/2022  Radiation Dose Prescribed: 3750 cGy in 5 fractions, 750 cGy per fraction  -last ADT 7/11/23, eligard 3m , refused Ellyn/pred    Other history  CAD, trigger finger, obesity, HTN, HL, CKD, headaches.migraines, carotid stenosis, GERD, CEA 1/23, Stroke /CVA 12/22, Anemia elevated SFLC      Subjective    HPI  Seen in follow up for his high risk prostate cancer. He is on ADT alone.  Here with his wife.     9/22 was on vacation. Had trouble breathing. Went to [facility] in [place]. Initially treated him as if he had an MI with CHF. He received diuretics. Saw infiltration on cxr. Repeat CXR showed pneumonia. Heart cath was okay. Echo looked okay per wife EF was \"perfect\". On antibiotics. Saw PCP, Dr. Fuller, yesterday and has more antibiotics.   Patient is better now. Breathing is better.     Appetite is pretty good/normal.   Sinus drainage/phlegm.   Denies sob now.   Denies n/v.   Bowels okay/normal.   Denies dysuria/hematuria.   Denies pain that is new or unusual or unexplainable.   +hot flashes continue.       Objective    BSA: There is no height or weight on file to calculate BSA.  There were no vitals taken for this visit.     Physical Exam  Constitutional:       General: He is not in acute distress.     Appearance: Normal appearance.   Eyes:      General: No scleral icterus.  Cardiovascular:      " Rate and Rhythm: Normal rate and regular rhythm.      Heart sounds: Normal heart sounds.   Pulmonary:      Effort: Pulmonary effort is normal.      Breath sounds: Normal breath sounds.   Abdominal:      General: Bowel sounds are normal. There is no distension.      Palpations: Abdomen is soft. There is no mass.      Tenderness: There is no abdominal tenderness. There is no guarding.   Musculoskeletal:         General: No tenderness.      Comments: No tenderness to palpation of spine   Lymphadenopathy:      Cervical: No cervical adenopathy.      Upper Body:      Right upper body: No supraclavicular or axillary adenopathy.      Left upper body: No supraclavicular or axillary adenopathy.      Comments: No adenopathy head/neck/axilla    Skin:     General: Skin is warm and dry.   Neurological:      Mental Status: He is alert.   Psychiatric:         Behavior: Behavior normal.           Performance Status:  Symptomatic; fully ambulatory      Assessment/Plan   On antibiotics for pneumonia. Improving.   PSA, CMP pending from today. PSA was undetectable in July.   Next eligard injection today, to complete 2 yrs of therapy in 4/2024.  Follow up in 3 mos with labs prior and for next injection.   Seeing cardiology Thursday this week.    He uses neurotin(gabapentin)for hot flashes. Usually takes 2 at night, one at noon and one at dinner.      Cancer Staging   No matching staging information was found for the patient.    Oncology History    No history exists.        Diagnoses and all orders for this visit:  Adenocarcinoma of prostate (CMS/HCC)  Other orders  -     leuprolide (3-month) (Lupron Depot) injection 22.5 mg  -     sodium chloride 0.9 % bolus 500 mL  -     dextrose 5 % bolus 500 mL  -     diphenhydrAMINE (BENADryl) injection 50 mg  -     methylPREDNISolone sod suc / (SOLU-Medrol) 40 mg injection 40 mg  -     famotidine PF (Pepcid) injection 20 mg  -     EPINEPHrine (Epipen) injection syringe 0.3 mg  -     albuterol 2.5 mg  /3 mL (0.083 %) nebulizer solution 3 mL           Berta Burris, APRN-CNP

## 2023-10-03 ENCOUNTER — LAB (OUTPATIENT)
Dept: LAB | Facility: LAB | Age: 83
End: 2023-10-03
Payer: MEDICARE

## 2023-10-03 ENCOUNTER — OFFICE VISIT (OUTPATIENT)
Dept: HEMATOLOGY/ONCOLOGY | Facility: CLINIC | Age: 83
End: 2023-10-03
Payer: MEDICARE

## 2023-10-03 ENCOUNTER — APPOINTMENT (OUTPATIENT)
Dept: LAB | Facility: CLINIC | Age: 83
End: 2023-10-03
Payer: MEDICARE

## 2023-10-03 ENCOUNTER — INFUSION (OUTPATIENT)
Dept: HEMATOLOGY/ONCOLOGY | Facility: CLINIC | Age: 83
End: 2023-10-03
Payer: MEDICARE

## 2023-10-03 VITALS
HEART RATE: 68 BPM | BODY MASS INDEX: 28.81 KG/M2 | TEMPERATURE: 97.5 F | WEIGHT: 195.11 LBS | DIASTOLIC BLOOD PRESSURE: 66 MMHG | OXYGEN SATURATION: 95 % | SYSTOLIC BLOOD PRESSURE: 154 MMHG | RESPIRATION RATE: 18 BRPM

## 2023-10-03 DIAGNOSIS — C61 ADENOCARCINOMA OF PROSTATE (MULTI): ICD-10-CM

## 2023-10-03 DIAGNOSIS — C61 ADENOCARCINOMA OF PROSTATE (MULTI): Primary | ICD-10-CM

## 2023-10-03 LAB
ALBUMIN SERPL BCP-MCNC: 4.3 G/DL (ref 3.4–5)
ALP SERPL-CCNC: 116 U/L (ref 33–136)
ALT SERPL W P-5'-P-CCNC: 14 U/L (ref 10–52)
ANION GAP SERPL CALC-SCNC: 14 MMOL/L (ref 10–20)
AST SERPL W P-5'-P-CCNC: 16 U/L (ref 9–39)
BASOPHILS # BLD AUTO: 0.04 X10*3/UL (ref 0–0.1)
BASOPHILS NFR BLD AUTO: 0.5 %
BILIRUB SERPL-MCNC: 0.3 MG/DL (ref 0–1.2)
BUN SERPL-MCNC: 35 MG/DL (ref 6–23)
CALCIUM SERPL-MCNC: 9.5 MG/DL (ref 8.6–10.6)
CHLORIDE SERPL-SCNC: 108 MMOL/L (ref 98–107)
CO2 SERPL-SCNC: 24 MMOL/L (ref 21–32)
CREAT SERPL-MCNC: 1.67 MG/DL (ref 0.5–1.3)
EOSINOPHIL # BLD AUTO: 0.23 X10*3/UL (ref 0–0.4)
EOSINOPHIL NFR BLD AUTO: 2.9 %
ERYTHROCYTE [DISTWIDTH] IN BLOOD BY AUTOMATED COUNT: 13.1 % (ref 11.5–14.5)
GFR SERPL CREATININE-BSD FRML MDRD: 41 ML/MIN/1.73M*2
GLUCOSE SERPL-MCNC: 108 MG/DL (ref 74–99)
HCT VFR BLD AUTO: 33.8 % (ref 41–52)
HGB BLD-MCNC: 11.3 G/DL (ref 13.5–17.5)
IMM GRANULOCYTES # BLD AUTO: 0.04 X10*3/UL (ref 0–0.5)
IMM GRANULOCYTES NFR BLD AUTO: 0.5 % (ref 0–0.9)
LYMPHOCYTES # BLD AUTO: 1.31 X10*3/UL (ref 0.8–3)
LYMPHOCYTES NFR BLD AUTO: 16.5 %
MCH RBC QN AUTO: 30.5 PG (ref 26–34)
MCHC RBC AUTO-ENTMCNC: 33.4 G/DL (ref 32–36)
MCV RBC AUTO: 91 FL (ref 80–100)
MONOCYTES # BLD AUTO: 0.67 X10*3/UL (ref 0.05–0.8)
MONOCYTES NFR BLD AUTO: 8.4 %
NEUTROPHILS # BLD AUTO: 5.66 X10*3/UL (ref 1.6–5.5)
NEUTROPHILS NFR BLD AUTO: 71.2 %
NRBC BLD-RTO: ABNORMAL /100{WBCS}
PLATELET # BLD AUTO: 161 X10*3/UL (ref 150–450)
PMV BLD AUTO: 9.3 FL (ref 7.5–11.5)
POTASSIUM SERPL-SCNC: 4.4 MMOL/L (ref 3.5–5.3)
PROT SERPL-MCNC: 7 G/DL (ref 6.4–8.2)
PSA SERPL-MCNC: <0.1 NG/ML
RBC # BLD AUTO: 3.71 X10*6/UL (ref 4.5–5.9)
SODIUM SERPL-SCNC: 142 MMOL/L (ref 136–145)
TESTOST SERPL-MCNC: <30 NG/DL (ref 240–1000)
WBC # BLD AUTO: 8 X10*3/UL (ref 4.4–11.3)

## 2023-10-03 PROCEDURE — 99214 OFFICE O/P EST MOD 30 MIN: CPT | Mod: 25 | Performed by: NURSE PRACTITIONER

## 2023-10-03 PROCEDURE — 85025 COMPLETE CBC W/AUTO DIFF WBC: CPT

## 2023-10-03 PROCEDURE — 84153 ASSAY OF PSA TOTAL: CPT

## 2023-10-03 PROCEDURE — 3077F SYST BP >= 140 MM HG: CPT | Performed by: NURSE PRACTITIONER

## 2023-10-03 PROCEDURE — 80053 COMPREHEN METABOLIC PANEL: CPT

## 2023-10-03 PROCEDURE — 96402 CHEMO HORMON ANTINEOPL SQ/IM: CPT

## 2023-10-03 PROCEDURE — 84403 ASSAY OF TOTAL TESTOSTERONE: CPT

## 2023-10-03 PROCEDURE — 1036F TOBACCO NON-USER: CPT | Performed by: NURSE PRACTITIONER

## 2023-10-03 PROCEDURE — 1159F MED LIST DOCD IN RCRD: CPT | Performed by: NURSE PRACTITIONER

## 2023-10-03 PROCEDURE — 99214 OFFICE O/P EST MOD 30 MIN: CPT | Performed by: NURSE PRACTITIONER

## 2023-10-03 PROCEDURE — 2500000004 HC RX 250 GENERAL PHARMACY W/ HCPCS (ALT 636 FOR OP/ED): Mod: JZ,JG | Performed by: NURSE PRACTITIONER

## 2023-10-03 PROCEDURE — 3078F DIAST BP <80 MM HG: CPT | Performed by: NURSE PRACTITIONER

## 2023-10-03 PROCEDURE — 1126F AMNT PAIN NOTED NONE PRSNT: CPT | Performed by: NURSE PRACTITIONER

## 2023-10-03 PROCEDURE — 36415 COLL VENOUS BLD VENIPUNCTURE: CPT

## 2023-10-03 RX ORDER — ALBUTEROL SULFATE 0.83 MG/ML
3 SOLUTION RESPIRATORY (INHALATION) AS NEEDED
Status: DISCONTINUED | OUTPATIENT
Start: 2023-10-03 | End: 2023-10-03 | Stop reason: HOSPADM

## 2023-10-03 RX ORDER — FAMOTIDINE 10 MG/ML
20 INJECTION INTRAVENOUS ONCE AS NEEDED
Status: DISCONTINUED | OUTPATIENT
Start: 2023-10-03 | End: 2023-10-03 | Stop reason: HOSPADM

## 2023-10-03 RX ORDER — ALBUTEROL SULFATE 0.83 MG/ML
3 SOLUTION RESPIRATORY (INHALATION) AS NEEDED
Status: CANCELLED | OUTPATIENT
Start: 2023-12-26

## 2023-10-03 RX ORDER — DIPHENHYDRAMINE HYDROCHLORIDE 50 MG/ML
50 INJECTION INTRAMUSCULAR; INTRAVENOUS AS NEEDED
Status: CANCELLED | OUTPATIENT
Start: 2023-12-26

## 2023-10-03 RX ORDER — EPINEPHRINE 0.3 MG/.3ML
0.3 INJECTION SUBCUTANEOUS EVERY 5 MIN PRN
Status: DISCONTINUED | OUTPATIENT
Start: 2023-10-03 | End: 2023-10-03 | Stop reason: HOSPADM

## 2023-10-03 RX ORDER — EPINEPHRINE 0.3 MG/.3ML
0.3 INJECTION SUBCUTANEOUS EVERY 5 MIN PRN
Status: CANCELLED | OUTPATIENT
Start: 2023-12-26

## 2023-10-03 RX ORDER — DIPHENHYDRAMINE HYDROCHLORIDE 50 MG/ML
50 INJECTION INTRAMUSCULAR; INTRAVENOUS AS NEEDED
Status: DISCONTINUED | OUTPATIENT
Start: 2023-10-03 | End: 2023-10-03 | Stop reason: HOSPADM

## 2023-10-03 RX ORDER — FAMOTIDINE 10 MG/ML
20 INJECTION INTRAVENOUS ONCE AS NEEDED
Status: CANCELLED | OUTPATIENT
Start: 2023-12-26

## 2023-10-03 RX ADMIN — LEUPROLIDE ACETATE 22.5 MG: KIT at 11:45

## 2023-10-03 ASSESSMENT — PAIN SCALES - GENERAL: PAINLEVEL: 0-NO PAIN

## 2023-10-05 ENCOUNTER — OFFICE VISIT (OUTPATIENT)
Dept: CARDIOLOGY | Facility: CLINIC | Age: 83
End: 2023-10-05
Payer: MEDICARE

## 2023-10-05 VITALS
BODY MASS INDEX: 29.24 KG/M2 | DIASTOLIC BLOOD PRESSURE: 48 MMHG | WEIGHT: 197.4 LBS | HEART RATE: 67 BPM | HEIGHT: 69 IN | SYSTOLIC BLOOD PRESSURE: 107 MMHG

## 2023-10-05 DIAGNOSIS — I10 ESSENTIAL HYPERTENSION: ICD-10-CM

## 2023-10-05 DIAGNOSIS — E78.2 MIXED HYPERLIPIDEMIA: ICD-10-CM

## 2023-10-05 DIAGNOSIS — I25.10 CORONARY ARTERY DISEASE INVOLVING NATIVE CORONARY ARTERY OF NATIVE HEART WITHOUT ANGINA PECTORIS: ICD-10-CM

## 2023-10-05 DIAGNOSIS — I50.32 CHRONIC DIASTOLIC HEART FAILURE (MULTI): Primary | ICD-10-CM

## 2023-10-05 DIAGNOSIS — I63.9 CEREBROVASCULAR ACCIDENT (CVA), UNSPECIFIED MECHANISM (MULTI): ICD-10-CM

## 2023-10-05 PROCEDURE — 3078F DIAST BP <80 MM HG: CPT | Performed by: NURSE PRACTITIONER

## 2023-10-05 PROCEDURE — 1160F RVW MEDS BY RX/DR IN RCRD: CPT | Performed by: NURSE PRACTITIONER

## 2023-10-05 PROCEDURE — 1126F AMNT PAIN NOTED NONE PRSNT: CPT | Performed by: NURSE PRACTITIONER

## 2023-10-05 PROCEDURE — 99417 PROLNG OP E/M EACH 15 MIN: CPT | Performed by: NURSE PRACTITIONER

## 2023-10-05 PROCEDURE — 3074F SYST BP LT 130 MM HG: CPT | Performed by: NURSE PRACTITIONER

## 2023-10-05 PROCEDURE — 99215 OFFICE O/P EST HI 40 MIN: CPT | Performed by: NURSE PRACTITIONER

## 2023-10-05 PROCEDURE — 1036F TOBACCO NON-USER: CPT | Performed by: NURSE PRACTITIONER

## 2023-10-05 PROCEDURE — 1159F MED LIST DOCD IN RCRD: CPT | Performed by: NURSE PRACTITIONER

## 2023-10-05 NOTE — PROGRESS NOTES
"Chief Complaint:   Hospital Follow Up     History Of Present Illness:    Arcadio Han is a 83 y.o. male here to follow up post hospitalization.  Patient presented to outside hospital with SOB with crackles. Felt like retaining fluid. EKG performed and showed ST depressions in V5/V6. BNP 4813. Was given IVP bumex with improvement in systems. Underwent LHC on 9/24 which showed 50% stenosis in RCA and Cx. 30% in mid/distal LAD. No PCI indicated.   Cr upon admit was 1.99, day of cath 1.6.    Dx PNA. Was treated with abx.     At present feels fatigued.     Exercising regularly. However, 10 days prior to hospitalization was SOB. Never thought it was PNA.     Now not SOB. Feels better than he has in awhile.      Allergies:  Other, Lidocaine, and Monosodium glutamate    Visit Vitals  BP (!) 107/48 (BP Location: Right arm, Patient Position: Sitting, BP Cuff Size: Adult)   Pulse 67   Ht 1.753 m (5' 9\")   Wt 89.5 kg (197 lb 6.4 oz)   BMI 29.15 kg/m²   Smoking Status Never   BSA 2.09 m²         Review of Systems   All other systems reviewed and are negative.      Last Labs:  CBC -  Lab Results   Component Value Date    WBC 8.0 10/03/2023    HGB 11.3 (L) 10/03/2023    HCT 33.8 (L) 10/03/2023    MCV 91 10/03/2023     10/03/2023       CMP -  Lab Results   Component Value Date    CALCIUM 9.5 10/03/2023    PHOS 3.6 06/22/2023    PROT 7.0 10/03/2023    ALBUMIN 4.3 10/03/2023    AST 16 10/03/2023    ALT 14 10/03/2023    ALKPHOS 116 10/03/2023    BILITOT 0.3 10/03/2023       LIPID PANEL -   Lab Results   Component Value Date    CHOL 127 09/05/2023    TRIG 196 (H) 09/05/2023    HDL 36.4 (A) 09/05/2023    CHHDL 3.5 09/05/2023    LDLF 51 09/05/2023    VLDL 39 09/05/2023    NHDL 106 10/10/2017       RENAL FUNCTION PANEL -   Lab Results   Component Value Date    GLUCOSE 108 (H) 10/03/2023     10/03/2023    K 4.4 10/03/2023     (H) 10/03/2023    CO2 24 10/03/2023    ANIONGAP 14 10/03/2023    BUN 35 (H) 10/03/2023    " CREATININE 1.67 (H) 10/03/2023    GFRMALE 37 (A) 07/11/2023    CALCIUM 9.5 10/03/2023    PHOS 3.6 06/22/2023    ALBUMIN 4.3 10/03/2023        Current Outpatient Medications:     amoxicillin-pot clavulanate (Augmentin) 875-125 mg tablet, Take 1 tablet (875 mg) by mouth 2 times a day., Disp: 10 tablet, Rfl: 0    azelastine (Astelin) 137 mcg (0.1 %) nasal spray, Administer 2 sprays into affected nostril(s) twice a day., Disp: , Rfl:     bumetanide (Bumex) 1 mg tablet, Take 1 tablet (1 mg) by mouth once daily., Disp: , Rfl:     butalbital-acetaminophen-caff -40 mg tablet, TAKE ONE TABLET BY MOUTH EVERY 4 HOURS AS NEEDED NOT TO EXCEED 6 TABLETS IN 24 HOURS, Disp: 120 tablet, Rfl: 0    calcium 500 mg calcium (1,250 mg) tablet, Take 1 tablet (1,250 mg) by mouth 2 times a day with meals., Disp: , Rfl:     cholecalciferol (Vitamin D-3) 25 MCG (1000 UT) capsule, Take 1 capsule (25 mcg) by mouth once daily., Disp: , Rfl:     clopidogrel (Plavix) 75 mg tablet, Take 1 tablet (75 mg) by mouth once daily., Disp: , Rfl:     gabapentin (Neurontin) 100 mg capsule, Take 1 capsule (100 mg) by mouth 5 times a day., Disp: , Rfl:     hydrocortisone (Anusol-HC) 2.5 % rectal cream, APPLY RECTALLY TWICE DAILY FOR 7 DAYS THEN AS NEEDED, Disp: , Rfl:     icosapent ethyL (Vascepa) 1 gram capsule, Take 2 capsules (2 g) by mouth 2 times a day with meals. swallowing whole. Do not chew, open, dissolve and/or crush, Disp: , Rfl:     leuprolide, 6-month, (Eligard) 45 mg injection, , Disp: , Rfl:     losartan (Cozaar) 25 mg tablet, Take 1 tablet (25 mg) by mouth once daily., Disp: , Rfl:     pantoprazole (ProtoNix) 20 mg EC tablet, Take 1 tablet (20 mg) by mouth once daily., Disp: , Rfl:     pitavastatin calcium (Livalo) 2 mg tablet, Take 1 tablet by mouth once daily., Disp: , Rfl:     tamsulosin (Flomax) 0.4 mg 24 hr capsule, Take 1 capsule (0.4 mg) by mouth once daily., Disp: , Rfl:     amoxicillin-pot clavulanate (Augmentin) 875-125 mg  tablet, Take 1 tablet (875 mg) by mouth 2 times a day for 5 days. (Patient not taking: Reported on 10/5/2023), Disp: 10 tablet, Rfl: 0    aspirin 81 mg capsule, Take 81 mg by mouth once daily., Disp: , Rfl:     carboxymethylcellulose (THERATears) 0.25 % ophthalmic solution, if needed for dry eyes., Disp: , Rfl:     multivitamin tablet, PRN, Disp: , Rfl:     omega-3/dha/epa/fish oil (OMEGA-3 FISH OIL ORAL), Take by mouth once daily. 300 mg, Disp: , Rfl:         Objective   Vitals reviewed.   Constitutional:       Appearance: Healthy appearance. Not in distress.   Neck:      Vascular: JVD normal.   Pulmonary:      Effort: Pulmonary effort is normal.      Breath sounds: Normal breath sounds.   Cardiovascular:      PMI at left midclavicular line. Normal rate. Regular rhythm.      Murmurs: There is no murmur.   Edema:     Peripheral edema absent.   Abdominal:      General: Bowel sounds are normal.   Skin:     General: Skin is warm and dry.   Neurological:      General: No focal deficit present.      Mental Status: Alert, oriented to person, place, and time and oriented to person, place and time.   Psychiatric:         Attention and Perception: Attention normal.         Mood and Affect: Mood normal.         Speech: Speech normal.         Behavior: Behavior normal.     Assessment/Plan   Diagnoses and all orders for this visit:  Chronic diastolic heart failure (CMS/HCC)  - New, 2/2 PNA  -Was trialed on Farxiga by nephrology; it potentated the effects of his testosterone blocker. They were going to trial farxiga when medication done. Would like him to start and SGL2 inhibitor for it's CV, as well as, renal benefit. I have reached to nephrology.   - Trial 0.5mg of bumex every other day. Monitor volume status closely. Handout given on clinical manifestations of fluid retention. He is euvolemic on 0.5mg q day.   - BP too soft for entresto  - Cr stable at 1.6  Coronary artery disease involving native coronary artery of native  heart without angina pectoris  - Cath negative for significant dx  - continue statin/asa  Mixed hyperlipidemia  - continue statin  Essential hypertension  - stable, soft. Take only 25mg of lostartan per day   - di in hospital, beta blocker discontinued   Cerebrovascular accident (CVA), unspecified mechanism (CMS/HCC)  - on plavix     I reviewed hospital records and testing with patient. Teaching provided on CHF, handout given

## 2023-10-12 DIAGNOSIS — G43.C0 PERIODIC HEADACHE SYNDROME, NOT INTRACTABLE: ICD-10-CM

## 2023-10-17 RX ORDER — BUTALBITAL, ACETAMINOPHEN AND CAFFEINE 50; 325; 40 MG/1; MG/1; MG/1
TABLET ORAL
Qty: 30 TABLET | Refills: 0 | Status: SHIPPED | OUTPATIENT
Start: 2023-10-17 | End: 2023-10-27 | Stop reason: SDUPTHER

## 2023-10-18 ENCOUNTER — PREP FOR PROCEDURE (OUTPATIENT)
Dept: PREOP | Facility: HOSPITAL | Age: 83
End: 2023-10-18
Payer: MEDICARE

## 2023-10-18 ENCOUNTER — HOSPITAL ENCOUNTER (OUTPATIENT)
Facility: HOSPITAL | Age: 83
Setting detail: OUTPATIENT SURGERY
End: 2023-10-18
Attending: ORTHOPAEDIC SURGERY | Admitting: ORTHOPAEDIC SURGERY
Payer: MEDICARE

## 2023-10-18 DIAGNOSIS — G56.01 CARPAL TUNNEL SYNDROME ON RIGHT: ICD-10-CM

## 2023-10-19 ENCOUNTER — OFFICE VISIT (OUTPATIENT)
Dept: CARDIOLOGY | Facility: CLINIC | Age: 83
End: 2023-10-19
Payer: MEDICARE

## 2023-10-19 ENCOUNTER — TELEPHONE (OUTPATIENT)
Dept: PRIMARY CARE | Facility: CLINIC | Age: 83
End: 2023-10-19

## 2023-10-19 VITALS
SYSTOLIC BLOOD PRESSURE: 130 MMHG | TEMPERATURE: 97.5 F | WEIGHT: 194.1 LBS | HEIGHT: 69 IN | BODY MASS INDEX: 28.75 KG/M2 | DIASTOLIC BLOOD PRESSURE: 66 MMHG | HEART RATE: 71 BPM

## 2023-10-19 DIAGNOSIS — I50.32 CHRONIC DIASTOLIC HEART FAILURE (MULTI): ICD-10-CM

## 2023-10-19 DIAGNOSIS — Z01.810 ENCOUNTER FOR PREOPERATIVE ASSESSMENT FOR NONCORONARY CARDIAC SURGERY: ICD-10-CM

## 2023-10-19 DIAGNOSIS — I10 ESSENTIAL HYPERTENSION: ICD-10-CM

## 2023-10-19 DIAGNOSIS — I63.9 CEREBROVASCULAR ACCIDENT (CVA), UNSPECIFIED MECHANISM (MULTI): ICD-10-CM

## 2023-10-19 DIAGNOSIS — I25.10 CORONARY ARTERY DISEASE INVOLVING NATIVE CORONARY ARTERY OF NATIVE HEART WITHOUT ANGINA PECTORIS: Primary | ICD-10-CM

## 2023-10-19 DIAGNOSIS — E78.2 MIXED HYPERLIPIDEMIA: ICD-10-CM

## 2023-10-19 PROCEDURE — 1159F MED LIST DOCD IN RCRD: CPT | Performed by: NURSE PRACTITIONER

## 2023-10-19 PROCEDURE — 1126F AMNT PAIN NOTED NONE PRSNT: CPT | Performed by: NURSE PRACTITIONER

## 2023-10-19 PROCEDURE — 3075F SYST BP GE 130 - 139MM HG: CPT | Performed by: NURSE PRACTITIONER

## 2023-10-19 PROCEDURE — 99215 OFFICE O/P EST HI 40 MIN: CPT | Performed by: NURSE PRACTITIONER

## 2023-10-19 PROCEDURE — 1160F RVW MEDS BY RX/DR IN RCRD: CPT | Performed by: NURSE PRACTITIONER

## 2023-10-19 PROCEDURE — 3078F DIAST BP <80 MM HG: CPT | Performed by: NURSE PRACTITIONER

## 2023-10-19 PROCEDURE — 1036F TOBACCO NON-USER: CPT | Performed by: NURSE PRACTITIONER

## 2023-10-19 NOTE — PROGRESS NOTES
"Chief Complaint:   CAD     History Of Present Illness:    Arcadio Han is a 83 y.o. male here with CAD.    Working out via cycling without complaint  Walking without complaint. Was becoming SOB walking when had PNA.   No longer dizzy when going from sitting to standing.   He is doing very well    Right CEA 1/30/2023 post multiple right hemispheric cerebral infarcts.  SEH (Normal EF, Large LAD ischemia.) - 11/13/2019  Cath (75% mid-LCX, mild LAD) - 11/15/2019  Cath (Severe diag, Moderate LAD, Severe right renal) - 1/31/2014     Allergies:  Other, Lidocaine, and Monosodium glutamate    Review of Systems   All other systems reviewed and are negative.      Visit Vitals  /66   Pulse 71   Temp 36.4 °C (97.5 °F)   Ht 1.753 m (5' 9\")   Wt 88 kg (194 lb 1.6 oz)   BMI 28.66 kg/m²   Smoking Status Never   BSA 2.07 m²     Last Labs:  CBC -  Lab Results   Component Value Date    WBC 8.0 10/03/2023    HGB 11.3 (L) 10/03/2023    HCT 33.8 (L) 10/03/2023    MCV 91 10/03/2023     10/03/2023       CMP -  Lab Results   Component Value Date    CALCIUM 9.5 10/03/2023    PHOS 3.6 06/22/2023    PROT 7.0 10/03/2023    ALBUMIN 4.3 10/03/2023    AST 16 10/03/2023    ALT 14 10/03/2023    ALKPHOS 116 10/03/2023    BILITOT 0.3 10/03/2023       LIPID PANEL -   Lab Results   Component Value Date    CHOL 127 09/05/2023    TRIG 196 (H) 09/05/2023    HDL 36.4 (A) 09/05/2023    CHHDL 3.5 09/05/2023    LDLF 51 09/05/2023    VLDL 39 09/05/2023    NHDL 106 10/10/2017       RENAL FUNCTION PANEL -   Lab Results   Component Value Date    GLUCOSE 108 (H) 10/03/2023     10/03/2023    K 4.4 10/03/2023     (H) 10/03/2023    CO2 24 10/03/2023    ANIONGAP 14 10/03/2023    BUN 35 (H) 10/03/2023    CREATININE 1.67 (H) 10/03/2023    GFRMALE 37 (A) 07/11/2023    CALCIUM 9.5 10/03/2023    PHOS 3.6 06/22/2023    ALBUMIN 4.3 10/03/2023        Lab Results   Component Value Date     (H) 12/15/2022    HGBA1C 5.4 12/15/2022       Objective "   Vitals reviewed.   Constitutional:       Appearance: Healthy appearance. Not in distress.   Neck:      Vascular: No JVR. JVD normal.   Pulmonary:      Effort: Pulmonary effort is normal.      Breath sounds: Normal breath sounds. No wheezing. No rhonchi. No rales.   Chest:      Chest wall: Not tender to palpatation.   Cardiovascular:      PMI at left midclavicular line. Normal rate. Regular rhythm. Normal S1. Normal S2.       Murmurs: There is no murmur.      No gallop.  No click. No rub.   Edema:     Peripheral edema absent.   Abdominal:      General: Bowel sounds are normal.      Palpations: Abdomen is soft.      Tenderness: There is no abdominal tenderness.   Musculoskeletal:         General: No tenderness. Skin:     General: Skin is warm and dry.   Neurological:      General: No focal deficit present.      Mental Status: Alert and oriented to person, place and time.       Assessment/Plan   Diagnoses and all orders for this visit:  Chronic diastolic heart failure (CMS/HCC)  - New, 2/2 PNA  -Was trialed on Farxiga by nephrology; it potentated the effects of his testosterone blocker. Would like him to start and SGL2 inhibitor for it's CV, as well as, renal benefit. I appreciate nephology input.  - He is euvolemic on 0.5mg of bumex every other day. Trial PRN for 3lb weight gain and edema   - BP too soft for entresto  Coronary artery disease involving native coronary artery of native heart without angina pectoris  - Cath negative for significant dx  - continue statin/asa  Mixed hyperlipidemia  - continue statin  Essential hypertension  - stable, no longer soft. Continue current meds   Cerebrovascular accident (CVA), unspecified mechanism (CMS/HCC)  - on plavix   Encounter for preoperative assessment for noncoronary cardiac surgery  - no CV barriers to carpal tunnel surgery  - RCRI score:3      Current Outpatient Medications:     amoxicillin-pot clavulanate (Augmentin) 875-125 mg tablet, Take 1 tablet (875 mg) by  mouth 2 times a day., Disp: 10 tablet, Rfl: 0    aspirin 81 mg capsule, Take 81 mg by mouth once daily., Disp: , Rfl:     azelastine (Astelin) 137 mcg (0.1 %) nasal spray, Administer 2 sprays into affected nostril(s) twice a day., Disp: , Rfl:     bumetanide (Bumex) 1 mg tablet, Take 1 tablet (1 mg) by mouth if needed (edema, 3lb weight gain in 24 hours)., Disp: , Rfl:     butalbital-acetaminophen-caff -40 mg tablet, TAKE ONE TABLET BY MOUTH EVERY 4 HOURS AS NEEDED NOT TO EXCEED 6 TABLETS IN 24 HOURS, Disp: 30 tablet, Rfl: 0    calcium 500 mg calcium (1,250 mg) tablet, Take 1 tablet (1,250 mg) by mouth 2 times a day with meals., Disp: , Rfl:     carboxymethylcellulose (THERATears) 0.25 % ophthalmic solution, if needed for dry eyes., Disp: , Rfl:     cholecalciferol (Vitamin D-3) 25 MCG (1000 UT) capsule, Take 1 capsule (25 mcg) by mouth once daily., Disp: , Rfl:     clopidogrel (Plavix) 75 mg tablet, Take 1 tablet (75 mg) by mouth once daily., Disp: , Rfl:     gabapentin (Neurontin) 100 mg capsule, Take 1 capsule (100 mg) by mouth 5 times a day., Disp: , Rfl:     hydrocortisone (Anusol-HC) 2.5 % rectal cream, APPLY RECTALLY TWICE DAILY FOR 7 DAYS THEN AS NEEDED, Disp: , Rfl:     icosapent ethyL (Vascepa) 1 gram capsule, Take 2 capsules (2 g) by mouth 2 times a day with meals. swallowing whole. Do not chew, open, dissolve and/or crush, Disp: , Rfl:     leuprolide, 6-month, (Eligard) 45 mg injection, , Disp: , Rfl:     losartan (Cozaar) 25 mg tablet, Take 1 tablet (25 mg) by mouth once daily., Disp: , Rfl:     multivitamin tablet, PRN, Disp: , Rfl:     omega-3/dha/epa/fish oil (OMEGA-3 FISH OIL ORAL), Take by mouth once daily. 300 mg, Disp: , Rfl:     pantoprazole (ProtoNix) 20 mg EC tablet, Take 1 tablet (20 mg) by mouth once daily., Disp: , Rfl:     pitavastatin calcium (Livalo) 2 mg tablet, Take 1 tablet by mouth once daily., Disp: , Rfl:     tamsulosin (Flomax) 0.4 mg 24 hr capsule, Take 1 capsule (0.4 mg)  by mouth once daily., Disp: , Rfl:

## 2023-10-19 NOTE — TELEPHONE ENCOUNTER
Patient is requesting a refill on butalbital-acetaminophen-caff -40mg #120. He was give a #30 and he said it only lasts about 8 days.  He needs it while taking chemo.  [facility] [place]

## 2023-10-27 ENCOUNTER — APPOINTMENT (OUTPATIENT)
Dept: PREADMISSION TESTING | Facility: HOSPITAL | Age: 83
End: 2023-10-27
Payer: MEDICARE

## 2023-10-27 DIAGNOSIS — G43.C0 PERIODIC HEADACHE SYNDROME, NOT INTRACTABLE: ICD-10-CM

## 2023-10-27 RX ORDER — BUTALBITAL, ACETAMINOPHEN AND CAFFEINE 50; 325; 40 MG/1; MG/1; MG/1
TABLET ORAL
Qty: 30 TABLET | Refills: 0 | Status: SHIPPED | OUTPATIENT
Start: 2023-10-27 | End: 2023-10-30 | Stop reason: SDUPTHER

## 2023-10-30 ENCOUNTER — OFFICE VISIT (OUTPATIENT)
Dept: PRIMARY CARE | Facility: CLINIC | Age: 83
End: 2023-10-30
Payer: MEDICARE

## 2023-10-30 VITALS
OXYGEN SATURATION: 97 % | BODY MASS INDEX: 29.36 KG/M2 | TEMPERATURE: 97.3 F | DIASTOLIC BLOOD PRESSURE: 60 MMHG | HEART RATE: 77 BPM | WEIGHT: 198.8 LBS | SYSTOLIC BLOOD PRESSURE: 162 MMHG

## 2023-10-30 DIAGNOSIS — G43.C0 PERIODIC HEADACHE SYNDROME, NOT INTRACTABLE: ICD-10-CM

## 2023-10-30 DIAGNOSIS — Z23 ENCOUNTER FOR IMMUNIZATION: Primary | ICD-10-CM

## 2023-10-30 PROCEDURE — 1126F AMNT PAIN NOTED NONE PRSNT: CPT | Performed by: INTERNAL MEDICINE

## 2023-10-30 PROCEDURE — 3077F SYST BP >= 140 MM HG: CPT | Performed by: INTERNAL MEDICINE

## 2023-10-30 PROCEDURE — 1160F RVW MEDS BY RX/DR IN RCRD: CPT | Performed by: INTERNAL MEDICINE

## 2023-10-30 PROCEDURE — 1036F TOBACCO NON-USER: CPT | Performed by: INTERNAL MEDICINE

## 2023-10-30 PROCEDURE — 99213 OFFICE O/P EST LOW 20 MIN: CPT | Performed by: INTERNAL MEDICINE

## 2023-10-30 PROCEDURE — 1159F MED LIST DOCD IN RCRD: CPT | Performed by: INTERNAL MEDICINE

## 2023-10-30 PROCEDURE — G0008 ADMIN INFLUENZA VIRUS VAC: HCPCS | Performed by: INTERNAL MEDICINE

## 2023-10-30 PROCEDURE — 3078F DIAST BP <80 MM HG: CPT | Performed by: INTERNAL MEDICINE

## 2023-10-30 PROCEDURE — 90662 IIV NO PRSV INCREASED AG IM: CPT | Performed by: INTERNAL MEDICINE

## 2023-10-30 RX ORDER — BUTALBITAL, ACETAMINOPHEN AND CAFFEINE 50; 325; 40 MG/1; MG/1; MG/1
TABLET ORAL
Qty: 120 TABLET | Refills: 0 | Status: SHIPPED | OUTPATIENT
Start: 2023-10-30

## 2023-10-30 ASSESSMENT — PATIENT HEALTH QUESTIONNAIRE - PHQ9
1. LITTLE INTEREST OR PLEASURE IN DOING THINGS: NOT AT ALL
2. FEELING DOWN, DEPRESSED OR HOPELESS: NOT AT ALL
SUM OF ALL RESPONSES TO PHQ9 QUESTIONS 1 AND 2: 0

## 2023-10-30 NOTE — PROGRESS NOTES
Subjective   Patient ID: [id number]   [facility]    Arcadio Han is a 83 y.o. male who presents for Pneumonia (Follow up).He had Pneumonia at [place] sate been treated with antibiotic still he feels some discomfort in the chest. He needs medication refill.        Objective   Visit Vitals  /60 (BP Location: Left arm, Patient Position: Sitting, BP Cuff Size: Large adult)   Pulse 77   Temp 36.3 °C (97.3 °F) (Temporal)      Review of Systems  All 12 systems reviewed, no other abnormality except that mentioned in HPI.    Physical Exam  Constitutional- no abnormality  ENT- no abnormality  Neck- no swelling  CVS- normal S1 and S2, no murmur.  Pulmonary- clear to auscultation,no rhonchi, no wheezes.  Abdomen- normal- liver and spleen, soft, no distension, bowel sound present.  Neurological- all cranial nerves intact, speech and gait normal, no sensory or motor deficiency.  Musculoskeletal- all pulses are normal, normal movement, no joint swelling.  Skin- no rash, dry.  psychiatry- no suicidal ideation.  Lymph node- no lymphadenopathy      Assessment/Plan   Problem List Items Addressed This Visit       Migraine    Relevant Medications    butalbital-acetaminophen-caff -40 mg tablet     Other Visit Diagnoses       Encounter for immunization    -  Primary    Relevant Orders    Flu vaccine, quadrivalent, high-dose, preservative free, age 65y+ (FLUZONE)            Susan Fuller MD

## 2023-10-31 ENCOUNTER — TELEPHONE (OUTPATIENT)
Dept: ADMISSION | Facility: HOSPITAL | Age: 83
End: 2023-10-31

## 2023-10-31 ENCOUNTER — TELEPHONE VISIT (OUTPATIENT)
Dept: HEMATOLOGY/ONCOLOGY | Facility: CLINIC | Age: 83
End: 2023-10-31
Payer: MEDICARE

## 2023-10-31 DIAGNOSIS — C61 ADENOCARCINOMA OF PROSTATE (MULTI): Primary | ICD-10-CM

## 2023-10-31 PROCEDURE — 99024 POSTOP FOLLOW-UP VISIT: CPT | Performed by: INTERNAL MEDICINE

## 2023-10-31 RX ORDER — GABAPENTIN 100 MG/1
200 CAPSULE ORAL 3 TIMES DAILY
Qty: 180 CAPSULE | Refills: 3 | Status: SHIPPED
Start: 2023-10-31 | End: 2024-03-26 | Stop reason: DRUGHIGH

## 2023-10-31 RX ORDER — GABAPENTIN 100 MG/1
100 CAPSULE ORAL
Qty: 150 CAPSULE | Refills: 2 | OUTPATIENT
Start: 2023-10-31

## 2023-10-31 NOTE — TELEPHONE ENCOUNTER
Dago Han called the refill line for his Gabapentin 100mg that he takes up to 5x  daily for hot flashes. Medication pended to team to approve and submit.   FUV 12/26.

## 2023-10-31 NOTE — PROGRESS NOTES
Discussed in detail  Can not take 5 times day  Taking 200mg throughout  Agree for 200 tid  Script sent  Will contact us if worsening

## 2023-11-20 ENCOUNTER — TELEPHONE (OUTPATIENT)
Dept: CARDIOLOGY | Facility: CLINIC | Age: 83
End: 2023-11-20
Payer: MEDICARE

## 2023-11-20 NOTE — TELEPHONE ENCOUNTER
SONNY for pt and went over below info. Pt to call back to notify us that he rec'd meesage and if he has any questions.

## 2023-11-20 NOTE — TELEPHONE ENCOUNTER
----- Message from Dago Han sent at 11/20/2023 11:26 AM EST -----  Regarding: medication question  Contact: [phone]  I have noted some minor swelling in my right ankle. Should I begin taking the Bumetanide at .5 mg every other day like before or at some other dosage? I have gained about six pounds in the last six weeks or so since leaving the hospital in [place] but increases slow--bad eating habits still. Lungs are clear--had checkup last week.

## 2023-12-07 ENCOUNTER — LAB (OUTPATIENT)
Dept: LAB | Facility: HOSPITAL | Age: 83
End: 2023-12-07
Payer: MEDICARE

## 2023-12-07 ENCOUNTER — TELEPHONE (OUTPATIENT)
Dept: ADMISSION | Facility: HOSPITAL | Age: 83
End: 2023-12-07
Payer: MEDICARE

## 2023-12-07 DIAGNOSIS — D64.9 ANEMIA, UNSPECIFIED TYPE: Primary | ICD-10-CM

## 2023-12-07 DIAGNOSIS — C61 ADENOCARCINOMA OF PROSTATE (MULTI): ICD-10-CM

## 2023-12-07 DIAGNOSIS — D64.9 ANEMIA, UNSPECIFIED TYPE: ICD-10-CM

## 2023-12-07 LAB
ALBUMIN SERPL BCP-MCNC: 4 G/DL (ref 3.4–5)
ALP SERPL-CCNC: 114 U/L (ref 33–136)
ALT SERPL W P-5'-P-CCNC: 12 U/L (ref 10–52)
ANION GAP SERPL CALC-SCNC: 8 MMOL/L (ref 10–20)
AST SERPL W P-5'-P-CCNC: 13 U/L (ref 9–39)
BASOPHILS # BLD AUTO: 0.03 X10*3/UL (ref 0–0.1)
BASOPHILS NFR BLD AUTO: 0.6 %
BILIRUB SERPL-MCNC: 0.3 MG/DL (ref 0–1.2)
BUN SERPL-MCNC: 34 MG/DL (ref 6–23)
CALCIUM SERPL-MCNC: 9 MG/DL (ref 8.6–10.3)
CHLORIDE SERPL-SCNC: 107 MMOL/L (ref 98–107)
CO2 SERPL-SCNC: 27 MMOL/L (ref 21–32)
CREAT SERPL-MCNC: 1.77 MG/DL (ref 0.5–1.3)
EOSINOPHIL # BLD AUTO: 0.14 X10*3/UL (ref 0–0.4)
EOSINOPHIL NFR BLD AUTO: 2.8 %
ERYTHROCYTE [DISTWIDTH] IN BLOOD BY AUTOMATED COUNT: 13.7 % (ref 11.5–14.5)
FERRITIN SERPL-MCNC: 68 NG/ML (ref 20–300)
GFR SERPL CREATININE-BSD FRML MDRD: 38 ML/MIN/1.73M*2
GLUCOSE SERPL-MCNC: 96 MG/DL (ref 74–99)
HCT VFR BLD AUTO: 31 % (ref 41–52)
HGB BLD-MCNC: 10.2 G/DL (ref 13.5–17.5)
HGB RETIC QN: 34 PG (ref 28–38)
IMM GRANULOCYTES # BLD AUTO: 0.01 X10*3/UL (ref 0–0.5)
IMM GRANULOCYTES NFR BLD AUTO: 0.2 % (ref 0–0.9)
IMMATURE RETIC FRACTION: 9.9 %
IRON SATN MFR SERPL: 25 % (ref 25–45)
IRON SERPL-MCNC: 60 UG/DL (ref 35–150)
LYMPHOCYTES # BLD AUTO: 0.85 X10*3/UL (ref 0.8–3)
LYMPHOCYTES NFR BLD AUTO: 17 %
MCH RBC QN AUTO: 29.8 PG (ref 26–34)
MCHC RBC AUTO-ENTMCNC: 32.9 G/DL (ref 32–36)
MCV RBC AUTO: 91 FL (ref 80–100)
MONOCYTES # BLD AUTO: 0.48 X10*3/UL (ref 0.05–0.8)
MONOCYTES NFR BLD AUTO: 9.6 %
NEUTROPHILS # BLD AUTO: 3.5 X10*3/UL (ref 1.6–5.5)
NEUTROPHILS NFR BLD AUTO: 69.8 %
PLATELET # BLD AUTO: 139 X10*3/UL (ref 150–450)
POTASSIUM SERPL-SCNC: 4.5 MMOL/L (ref 3.5–5.3)
PROT SERPL-MCNC: 6.6 G/DL (ref 6.4–8.2)
PSA SERPL-MCNC: 0.01 NG/ML
RBC # BLD AUTO: 3.42 X10*6/UL (ref 4.5–5.9)
RETICS #: 0.07 X10*6/UL (ref 0.02–0.11)
RETICS/RBC NFR AUTO: 2 % (ref 0.5–2)
SODIUM SERPL-SCNC: 137 MMOL/L (ref 136–145)
TESTOST SERPL-MCNC: <30 NG/DL (ref 240–1000)
TIBC SERPL-MCNC: 243 UG/DL (ref 240–445)
UIBC SERPL-MCNC: 183 UG/DL (ref 110–370)
WBC # BLD AUTO: 5 X10*3/UL (ref 4.4–11.3)

## 2023-12-07 PROCEDURE — 84403 ASSAY OF TOTAL TESTOSTERONE: CPT

## 2023-12-07 PROCEDURE — 82728 ASSAY OF FERRITIN: CPT

## 2023-12-07 PROCEDURE — 80053 COMPREHEN METABOLIC PANEL: CPT

## 2023-12-07 PROCEDURE — 36415 COLL VENOUS BLD VENIPUNCTURE: CPT

## 2023-12-07 PROCEDURE — 85045 AUTOMATED RETICULOCYTE COUNT: CPT

## 2023-12-07 PROCEDURE — 83540 ASSAY OF IRON: CPT

## 2023-12-07 PROCEDURE — 85025 COMPLETE CBC W/AUTO DIFF WBC: CPT

## 2023-12-07 PROCEDURE — 84153 ASSAY OF PSA TOTAL: CPT

## 2023-12-07 PROCEDURE — 83550 IRON BINDING TEST: CPT

## 2023-12-07 NOTE — TELEPHONE ENCOUNTER
[facility] called stating the pt was there for labs and had some drawn that were ordered by Berta Burris CNP but also needed labs ordered by Dr. Zelaya's team. Secure chat sent to the team. Yesi Morrison CNP said she will enter them. I called Jovana at [facility] back and let her know.

## 2023-12-11 ENCOUNTER — LAB (OUTPATIENT)
Dept: LAB | Facility: LAB | Age: 83
End: 2023-12-11
Payer: MEDICARE

## 2023-12-11 DIAGNOSIS — N18.32 CHRONIC KIDNEY DISEASE, STAGE 3B (MULTI): Primary | ICD-10-CM

## 2023-12-11 LAB
ALBUMIN SERPL BCP-MCNC: 4.2 G/DL (ref 3.4–5)
ANION GAP SERPL CALC-SCNC: 16 MMOL/L
APPEARANCE UR: CLEAR
BILIRUB UR STRIP.AUTO-MCNC: NEGATIVE MG/DL
BUN SERPL-MCNC: 40 MG/DL (ref 6–23)
CALCIUM SERPL-MCNC: 9.1 MG/DL (ref 8.6–10.3)
CHLORIDE SERPL-SCNC: 106 MMOL/L (ref 98–107)
CO2 SERPL-SCNC: 21 MMOL/L (ref 21–32)
COLOR UR: NORMAL
CREAT SERPL-MCNC: 1.7 MG/DL (ref 0.5–1.3)
CREAT UR-MCNC: 42.5 MG/DL (ref 20–370)
CREAT UR-MCNC: 42.5 MG/DL (ref 20–370)
ERYTHROCYTE [DISTWIDTH] IN BLOOD BY AUTOMATED COUNT: 13.4 % (ref 11.5–14.5)
GFR SERPL CREATININE-BSD FRML MDRD: 40 ML/MIN/1.73M*2
GLUCOSE SERPL-MCNC: 100 MG/DL (ref 74–99)
GLUCOSE UR STRIP.AUTO-MCNC: NEGATIVE MG/DL
HCT VFR BLD AUTO: 32.7 % (ref 41–52)
HGB BLD-MCNC: 10.8 G/DL (ref 13.5–17.5)
KETONES UR STRIP.AUTO-MCNC: NEGATIVE MG/DL
LEUKOCYTE ESTERASE UR QL STRIP.AUTO: NEGATIVE
MCH RBC QN AUTO: 30 PG (ref 26–34)
MCHC RBC AUTO-ENTMCNC: 33 G/DL (ref 32–36)
MCV RBC AUTO: 91 FL (ref 80–100)
MICROALBUMIN UR-MCNC: <7 MG/L
MICROALBUMIN/CREAT UR: NORMAL MG/G{CREAT}
NITRITE UR QL STRIP.AUTO: NEGATIVE
NRBC BLD-RTO: 0 /100 WBCS (ref 0–0)
PH UR STRIP.AUTO: 5 [PH]
PHOSPHATE SERPL-MCNC: 3.6 MG/DL (ref 2.5–4.9)
PLATELET # BLD AUTO: 161 X10*3/UL (ref 150–450)
POTASSIUM SERPL-SCNC: 4.5 MMOL/L (ref 3.5–5.3)
PROT UR STRIP.AUTO-MCNC: NEGATIVE MG/DL
PROT UR-ACNC: 5 MG/DL (ref 5–25)
PROT/CREAT UR: 0.12 MG/MG CREAT (ref 0–0.17)
RBC # BLD AUTO: 3.6 X10*6/UL (ref 4.5–5.9)
RBC # UR STRIP.AUTO: NEGATIVE /UL
SODIUM SERPL-SCNC: 138 MMOL/L (ref 136–145)
SP GR UR STRIP.AUTO: 1.01
UROBILINOGEN UR STRIP.AUTO-MCNC: <2 MG/DL
WBC # BLD AUTO: 5.2 X10*3/UL (ref 4.4–11.3)

## 2023-12-11 PROCEDURE — 82043 UR ALBUMIN QUANTITATIVE: CPT

## 2023-12-11 PROCEDURE — 82570 ASSAY OF URINE CREATININE: CPT

## 2023-12-11 PROCEDURE — 36415 COLL VENOUS BLD VENIPUNCTURE: CPT

## 2023-12-11 PROCEDURE — 83970 ASSAY OF PARATHORMONE: CPT

## 2023-12-11 PROCEDURE — 81003 URINALYSIS AUTO W/O SCOPE: CPT

## 2023-12-11 PROCEDURE — 84156 ASSAY OF PROTEIN URINE: CPT

## 2023-12-11 PROCEDURE — 85027 COMPLETE CBC AUTOMATED: CPT

## 2023-12-11 PROCEDURE — 80069 RENAL FUNCTION PANEL: CPT

## 2023-12-11 PROCEDURE — 82306 VITAMIN D 25 HYDROXY: CPT

## 2023-12-12 LAB
25(OH)D3 SERPL-MCNC: 31 NG/ML (ref 30–100)
PTH-INTACT SERPL-MCNC: 86.4 PG/ML (ref 18.5–88)

## 2023-12-14 ENCOUNTER — OFFICE VISIT (OUTPATIENT)
Dept: HEMATOLOGY/ONCOLOGY | Facility: HOSPITAL | Age: 83
End: 2023-12-14
Payer: MEDICARE

## 2023-12-14 VITALS
DIASTOLIC BLOOD PRESSURE: 41 MMHG | SYSTOLIC BLOOD PRESSURE: 128 MMHG | WEIGHT: 184.5 LBS | RESPIRATION RATE: 18 BRPM | BODY MASS INDEX: 27.25 KG/M2 | OXYGEN SATURATION: 97 % | HEART RATE: 64 BPM | TEMPERATURE: 97.9 F

## 2023-12-14 DIAGNOSIS — D64.9 ANEMIA, UNSPECIFIED TYPE: Primary | ICD-10-CM

## 2023-12-14 PROCEDURE — 3078F DIAST BP <80 MM HG: CPT | Performed by: STUDENT IN AN ORGANIZED HEALTH CARE EDUCATION/TRAINING PROGRAM

## 2023-12-14 PROCEDURE — 1159F MED LIST DOCD IN RCRD: CPT | Performed by: STUDENT IN AN ORGANIZED HEALTH CARE EDUCATION/TRAINING PROGRAM

## 2023-12-14 PROCEDURE — 1160F RVW MEDS BY RX/DR IN RCRD: CPT | Performed by: STUDENT IN AN ORGANIZED HEALTH CARE EDUCATION/TRAINING PROGRAM

## 2023-12-14 PROCEDURE — 1126F AMNT PAIN NOTED NONE PRSNT: CPT | Performed by: STUDENT IN AN ORGANIZED HEALTH CARE EDUCATION/TRAINING PROGRAM

## 2023-12-14 PROCEDURE — 99214 OFFICE O/P EST MOD 30 MIN: CPT | Performed by: STUDENT IN AN ORGANIZED HEALTH CARE EDUCATION/TRAINING PROGRAM

## 2023-12-14 PROCEDURE — 3074F SYST BP LT 130 MM HG: CPT | Performed by: STUDENT IN AN ORGANIZED HEALTH CARE EDUCATION/TRAINING PROGRAM

## 2023-12-14 PROCEDURE — 1036F TOBACCO NON-USER: CPT | Performed by: STUDENT IN AN ORGANIZED HEALTH CARE EDUCATION/TRAINING PROGRAM

## 2023-12-14 ASSESSMENT — PAIN SCALES - GENERAL: PAINLEVEL: 0-NO PAIN

## 2023-12-14 ASSESSMENT — ENCOUNTER SYMPTOMS
OCCASIONAL FEELINGS OF UNSTEADINESS: 0
LOSS OF SENSATION IN FEET: 1
DEPRESSION: 0

## 2023-12-14 NOTE — PROGRESS NOTES
"Patient ID: Dago Han \"Arcadio\" is a 83 y.o. male.  Referring Physician: No referring provider defined for this encounter.  Primary Care Provider: No Assigned PCP Generic Provider, MD  Visit Type: Follow Up  Diagnosis: Anemia      Subjective    HPI  83 y.o. male with a PMH significant for prostate cancer (had local RT and remains on ADT), h/o CVA/TIA s/p endarterectomy on clopidogrel,  HTN controlled, who is followed for anemia.      Has mild anemia Hb 11.5-12.5 since 2016, NCNC, mild thrombocytopenia in the past which has resolved. Pt has no particular complaints. Is taking gabapentin for hot flashes, brain fog, tingling in extremities since starting the ADT. Has had prostate cancer  since last 2yrs. Also has fatigue. Joint pains, has had back surgery x2. PSA has been undetectable.   Pt is a practicing neuro-psychologist.   Age appropriate cancer screening: adequate   FMH: ister has 'sticky platelets', daughter had breast cancer, mother had MM, father ?lung cancer, brothers in their 60s with MM and bladder cancer.   Social history: [place], semi-retired / neuro psychology, professor at [employer], 3 children, 1 daughter, son and daughter, quit smoking 50 yrs ago (per 1/2023 visit), no ETOH, no RDA.   12/14/2023: pt being seen in follow up for anemia, no particular complaints other than recent ?PNA needing admission while he was traveling and prolonged antibiotics. Appears mostly recovered now.    Review of Systems - Oncology  10 point review of systems negative except as stated in HPI    Objective   Past Surgical History:   Procedure Laterality Date    LUMBAR LAMINECTOMY  10/17/2013    Laminectomy Lumbar    MR HEAD ANGIO WO IV CONTRAST  10/29/2013    MR HEAD ANGIO WO IV CONTRAST 10/29/2013 [facility]CILL[facility]    MR HEAD ANGIO WO IV CONTRAST  10/18/2016    [place] HEAD ANGIO WO [place] [facility] [place]    MR HEAD ANGIO WO IV CONTRAST  12/14/2022    MR HEAD ANGIO WO IV CONTRAST 12/14/2022 " DOCTOR OFFICE LEGACY    MR NECK ANGIO WO IV CONTRAST  11/1/2019    MR NECK ANGIO WO IV CONTRAST 11/1/2019 [facility] CLINICAL LEGACY    MR NECK ANGIO WO IV CONTRAST  6/19/2021    MR NECK ANGIO WO IV CONTRAST 6/19/2021 [facility]CILLARY LEGACY    MR NECK ANGIO WO IV CONTRAST  11/8/2016    MR NECK ANGIO WO IV CONTRAST 11/8/2016 [facility] [place] ANCILLARY LEGACY    MR NECK ANGIO WO IV CONTRAST  12/14/2022    MR NECK ANGIO WO IV CONTRAST 12/14/2022 DOCTOR OFFICE LEGACY    OTHER SURGICAL HISTORY  10/17/2013    Transurethral Resection Of Prostate    OTHER SURGICAL HISTORY  02/13/2014    Cardiac Cath Procedure Outcome: Successful     Family History   Problem Relation Name Age of Onset    Multiple myeloma Mother      Lung cancer Father      Multiple myeloma Brother      Other (sarcoma) Brother       Oncology History    No history exists.       Physical Exam  Vitals reviewed.   Constitutional:       General: He is not in acute distress.     Appearance: Normal appearance. He is not toxic-appearing.   Eyes:      Comments: No pallor or icterus    Neck:      Comments: No palpable cervical or axillar adenopathy   Pulmonary:      Effort: Pulmonary effort is normal.      Breath sounds: Normal breath sounds.   Abdominal:      General: There is distension.      Palpations: Abdomen is soft.      Tenderness: There is no abdominal tenderness.      Comments: No palpable hepatosplenomegaly    Musculoskeletal:      Comments: No pedal edema    Neurological:      General: No focal deficit present.      Mental Status: He is alert and oriented to person, place, and time.   Psychiatric:         Mood and Affect: Mood normal.       BSA: 2.02 meters squared  BP (!) 128/41 (BP Location: Left arm, Patient Position: Sitting, BP Cuff Size: Adult)   Pulse 64   Temp 36.6 °C (97.9 °F) (Temporal)   Resp 18   Wt 83.7 kg (184 lb 8 oz)   SpO2 97%   BMI 27.25 kg/m²     Labs:  Lab Results   Component Value Date    WBC 5.2 12/11/2023    NEUTROABS 3.50 12/07/2023    IGABSOL 0.01  "12/07/2023    LYMPHSABS 0.85 12/07/2023    MONOSABS 0.48 12/07/2023    EOSABS 0.14 12/07/2023    BASOSABS 0.03 12/07/2023    RBC 3.60 (L) 12/11/2023    MCV 91 12/11/2023    MCHC 33.0 12/11/2023    HGB 10.8 (L) 12/11/2023    HCT 32.7 (L) 12/11/2023     12/11/2023     Lab Results   Component Value Date    RETICCTPCT 2.0 12/07/2023      Lab Results   Component Value Date    CREATININE 1.70 (H) 12/11/2023    BUN 40 (H) 12/11/2023    EGFR 40 (L) 12/11/2023     12/11/2023    K 4.5 12/11/2023     12/11/2023    CO2 21 12/11/2023      Lab Results   Component Value Date    ALT 12 12/07/2023    AST 13 12/07/2023    ALKPHOS 114 12/07/2023    BILITOT 0.3 12/07/2023      Lab Results   Component Value Date    TSH 0.62 12/15/2022     Lab Results   Component Value Date    TSH 0.62 12/15/2022     Lab Results   Component Value Date    IRON 60 12/07/2023    TIBC 243 12/07/2023    FERRITIN 68 12/07/2023      Lab Results   Component Value Date    WKLEGHQT43 335 06/22/2023      Lab Results   Component Value Date    FOLATE 14.9 06/22/2023     Lab Results   Component Value Date    SEDRATE 2 06/02/2021      Lab Results   Component Value Date    CRP 0.42 06/02/2021      No results found for: \"TRISHA\"  Lab Results   Component Value Date     06/22/2023     Lab Results   Component Value Date    HAPTOGLOBIN 92 06/22/2023     Lab Results   Component Value Date    SPEP NORMAL 06/22/2023     No results found for: \"IGG\", \"IGM\", \"IGA\"     No components found for: \"PT\"  aPTT   Date/Time Value Ref Range Status   06/15/2020 10:16 AM 28 25 - 35 sec Final     Comment:      Note new reference range as of 05/26/2020.    THE APTT IS NO LONGER USED FOR MONITORING     UNFRACTIONATED HEPARIN THERAPY.    FOR MONITORING HEPARIN THERAPY,     USE THE HEPARIN ASSAY.         Assessment/Plan    83 y.o. male with a PMH significant for prostate cancer (had local RT and remains on ADT), h/o CVA/TIA s/p endarterectomy on clopidogrel, HTN controlled, " who is followed for anemia.     # Anemia:    Has mild anemia Hb 11.5-12.5 since 2016, NCNC, mild thrombocytopenia in the past which has resolved. Pt has no particular complaints. Smear to be reviewed.   Work up form this visit showed mild persistent NCNC anemia, mild borderline thrombocytopenia, other cell lines WNL. Hypoproliferative retic response. B12 and folate WNL, iron not significantly deficient. Myeloma work up excluding urine studies not showing  evidence of a monoclonal process, hemolysis markers are negative. Renal function is progressively worse over last 2 yrs.   Work up suggests anemia of androgen deprivation, inflammation, renal disease, with borderline iron deficiency as relates to renal disease. Pt taking PO iron with improvement in iron panel. H/H stable.   plan:   - complete PO iron 6 months.   - consider EPO if his Hb drop persistently below 9 in future with known kidney disease  - 6 month follow up    Issa Zelaya MD

## 2023-12-18 ENCOUNTER — OFFICE VISIT (OUTPATIENT)
Dept: NEPHROLOGY | Facility: CLINIC | Age: 83
End: 2023-12-18
Payer: MEDICARE

## 2023-12-18 ENCOUNTER — HOSPITAL ENCOUNTER (OUTPATIENT)
Dept: RADIATION ONCOLOGY | Facility: HOSPITAL | Age: 83
Setting detail: RADIATION/ONCOLOGY SERIES
Discharge: HOME | End: 2023-12-18
Payer: MEDICARE

## 2023-12-18 VITALS
RESPIRATION RATE: 18 BRPM | DIASTOLIC BLOOD PRESSURE: 52 MMHG | SYSTOLIC BLOOD PRESSURE: 101 MMHG | OXYGEN SATURATION: 95 % | HEIGHT: 69 IN | BODY MASS INDEX: 28.91 KG/M2 | WEIGHT: 195.2 LBS | HEART RATE: 68 BPM | TEMPERATURE: 97.3 F

## 2023-12-18 VITALS
HEIGHT: 69 IN | SYSTOLIC BLOOD PRESSURE: 130 MMHG | DIASTOLIC BLOOD PRESSURE: 69 MMHG | HEART RATE: 62 BPM | BODY MASS INDEX: 28.56 KG/M2 | WEIGHT: 192.8 LBS

## 2023-12-18 DIAGNOSIS — N18.32 STAGE 3B CHRONIC KIDNEY DISEASE (MULTI): Primary | ICD-10-CM

## 2023-12-18 DIAGNOSIS — C61 PROSTATE CANCER (MULTI): Primary | ICD-10-CM

## 2023-12-18 DIAGNOSIS — D63.1 ANEMIA DUE TO STAGE 3B CHRONIC KIDNEY DISEASE (MULTI): ICD-10-CM

## 2023-12-18 DIAGNOSIS — N18.32 ANEMIA DUE TO STAGE 3B CHRONIC KIDNEY DISEASE (MULTI): ICD-10-CM

## 2023-12-18 DIAGNOSIS — I12.9 HYPERTENSIVE KIDNEY DISEASE WITH STAGE 3B CHRONIC KIDNEY DISEASE (MULTI): ICD-10-CM

## 2023-12-18 DIAGNOSIS — N18.32 HYPERTENSIVE KIDNEY DISEASE WITH STAGE 3B CHRONIC KIDNEY DISEASE (MULTI): ICD-10-CM

## 2023-12-18 DIAGNOSIS — K21.9 GASTROESOPHAGEAL REFLUX DISEASE WITHOUT ESOPHAGITIS: ICD-10-CM

## 2023-12-18 PROCEDURE — 3078F DIAST BP <80 MM HG: CPT | Performed by: INTERNAL MEDICINE

## 2023-12-18 PROCEDURE — 99214 OFFICE O/P EST MOD 30 MIN: CPT

## 2023-12-18 PROCEDURE — 3075F SYST BP GE 130 - 139MM HG: CPT | Performed by: INTERNAL MEDICINE

## 2023-12-18 PROCEDURE — 99213 OFFICE O/P EST LOW 20 MIN: CPT | Performed by: INTERNAL MEDICINE

## 2023-12-18 PROCEDURE — 1159F MED LIST DOCD IN RCRD: CPT | Performed by: INTERNAL MEDICINE

## 2023-12-18 PROCEDURE — 1126F AMNT PAIN NOTED NONE PRSNT: CPT | Performed by: INTERNAL MEDICINE

## 2023-12-18 PROCEDURE — 1036F TOBACCO NON-USER: CPT | Performed by: INTERNAL MEDICINE

## 2023-12-18 PROCEDURE — 1160F RVW MEDS BY RX/DR IN RCRD: CPT | Performed by: INTERNAL MEDICINE

## 2023-12-18 PROCEDURE — 99213 OFFICE O/P EST LOW 20 MIN: CPT

## 2023-12-18 ASSESSMENT — ENCOUNTER SYMPTOMS
DEPRESSION: 0
ALLERGIC/IMMUNOLOGIC NEGATIVE: 1
RECTAL PAIN: 0
ARTHRALGIAS: 0
FREQUENCY: 0
CHEST TIGHTNESS: 0
BLOOD IN STOOL: 0
UNEXPECTED WEIGHT CHANGE: 0
BACK PAIN: 0
SHORTNESS OF BREATH: 0
HEMATURIA: 0
OCCASIONAL FEELINGS OF UNSTEADINESS: 0
FATIGUE: 0
PALPITATIONS: 0
DIARRHEA: 0
DYSURIA: 0
PSYCHIATRIC NEGATIVE: 1
FEVER: 0
DIZZINESS: 0
LOSS OF SENSATION IN FEET: 0
ANAL BLEEDING: 0
DIFFICULTY URINATING: 0
COUGH: 0
ABDOMINAL PAIN: 0
WEAKNESS: 0
CONSTIPATION: 0
JOINT SWELLING: 0

## 2023-12-18 ASSESSMENT — COLUMBIA-SUICIDE SEVERITY RATING SCALE - C-SSRS
1. IN THE PAST MONTH, HAVE YOU WISHED YOU WERE DEAD OR WISHED YOU COULD GO TO SLEEP AND NOT WAKE UP?: NO
2. HAVE YOU ACTUALLY HAD ANY THOUGHTS OF KILLING YOURSELF?: NO
6. HAVE YOU EVER DONE ANYTHING, STARTED TO DO ANYTHING, OR PREPARED TO DO ANYTHING TO END YOUR LIFE?: NO

## 2023-12-18 ASSESSMENT — PATIENT HEALTH QUESTIONNAIRE - PHQ9
2. FEELING DOWN, DEPRESSED OR HOPELESS: NOT AT ALL
1. LITTLE INTEREST OR PLEASURE IN DOING THINGS: NOT AT ALL
SUM OF ALL RESPONSES TO PHQ9 QUESTIONS 1 AND 2: 0

## 2023-12-18 NOTE — PROGRESS NOTES
"Cancer synopsis:  Rad/onc: Diamante ANTHONY  Med/onc: Dr. Ocasio    Prostate Cancer - High Risk , Local Prostate  Treatment  -elevated PSA 11.18, MRI 2/2022 - 2.7x2.5 right prostate, EPE, SV invasion, no LAD, 3/2022 - BS - negative, Prostate biopsy 3/22 - multiple cores of Gr Gr 4 , PNI, refused PET PSMA    -ADT started 4/19/22    - SBRT to prostate and seminal vesicles, treatment Dates: 05/27/2022-06/08/2022  Radiation Dose Prescribed: 3750 cGy in 5 fractions, 750 cGy per fraction      Patient ID: [id number]     Dago Han \"Jocelyne" is a 83 y.o. male who presents for his high risk prostate cancer GS 4+4=8, IPSA 11.18 now s/p SBRT 06/08/2022 on current ADT.     RT Site: Prostate and SV  RT Date: 06/08/2022  Hormone therapy: Yes, on lng-term started 04/2022  Hot Flushes: Admits, to frequent tingling prior to hot flushes. Occurs about every 2 hrs per patient.  Fatigue: Denies  Bone pain: Denies  ANTHONY: 1  ED: 1  ED medications: No  IPSS: 13  Urinary symptoms: Denies dysuria, hematuria, Does report some frequency associated with hot flushes. Admits to weak stream at times. Reports more then in the past but can be weak at times.  Urinary Medications: Yes, flowmax daily  Pain:   Rectal bleeding: Denies  Other systems: Denies SOB, CP or fever.      Review of systems:  Review of Systems   Constitutional:  Negative for fatigue, fever and unexpected weight change.   Respiratory:  Negative for cough, chest tightness and shortness of breath.    Cardiovascular:  Negative for chest pain, palpitations and leg swelling.   Gastrointestinal:  Negative for abdominal pain, anal bleeding, blood in stool, constipation, diarrhea and rectal pain.   Endocrine: Negative for cold intolerance, heat intolerance and polyuria.   Genitourinary:  Negative for decreased urine volume, difficulty urinating, dysuria, frequency, hematuria and urgency.   Musculoskeletal:  Negative for arthralgias, back pain, gait problem and joint swelling.   Skin: " Negative.    Allergic/Immunologic: Negative.    Neurological:  Negative for dizziness, syncope and weakness.   Psychiatric/Behavioral: Negative.         Past Medical history  Past Medical History:   Diagnosis Date    Acute bronchitis due to other specified organisms 09/05/2019    Acute bacterial bronchitis    Acute frontal sinusitis, unspecified 02/28/2018    Acute frontal sinusitis    Allergy status to unspecified drugs, medicaments and biological substances 08/19/2016    History of adverse drug reaction    Anal fistula     Anal fistula    Atherosclerotic heart disease of native coronary artery without angina pectoris 12/22/2022    Atherosclerotic heart disease of native coronary artery without angina pectoris    Bruit of right carotid artery 08/22/2023    Candidiasis of skin and nail 12/06/2017    Yeast dermatitis    Carotid artery obstruction, 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    Carotid stenosis, asymptomatic, right 08/22/2023    Cellulitis of unspecified toe 02/12/2016    Paronychia of toe    Chronic sinusitis, unspecified 09/26/2019    Sinobronchitis    Contact with and (suspected) exposure to unspecified communicable disease 06/21/2017    Exposure to communicable disease    Essential (primary) hypertension 09/12/2022    Essential hypertension    Headache, unspecified 02/27/2018    Chronic daily headache    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    Anemia due to blood loss    Lumbago with sciatica, right side 05/16/2019    Chronic right-sided low back pain with right-sided sciatica    Mixed hyperlipidemia 02/28/2022    Mixed hyperlipidemia    Occlusion and stenosis of bilateral carotid arteries     Carotid artery obstruction, bilateral    Olecranon bursitis, left elbow 12/06/2017    Olecranon bursitis of left elbow    Other abnormalities of gait and mobility 10/15/2019    Imbalance    Other allergic rhinitis 04/26/2016    Perennial allergic rhinitis    Other symptoms and signs involving general  sensations and perceptions 10/05/2016    Sensation of pressure in face    Other symptoms and signs involving the musculoskeletal system 08/24/2018    Arm weakness    Pain in right ankle and joints of right foot 03/23/2018    Right ankle pain    Pain in unspecified foot 03/11/2015    Foot pain    Pain in unspecified hand 07/01/2014    Hand pain    Personal history of diseases of the skin and subcutaneous tissue 03/11/2015    History of onychia and paronychia    Personal history of other diseases of male genital organs 09/06/2016    History of acute prostatitis    Personal history of other diseases of male genital organs 03/05/2014    History of prostatitis    Personal history of other diseases of male genital organs 06/28/2016    History of benign prostatic hyperplasia    Personal history of other diseases of the musculoskeletal system and connective tissue 02/27/2018    History of muscle pain    Personal history of other diseases of the nervous system and sense organs 10/17/2013    History of diplopia    Personal history of other diseases of the nervous system and sense organs 11/14/2014    History of carpal tunnel syndrome    Personal history of other diseases of the respiratory system 09/24/2016    History of chronic sinusitis    Personal history of other diseases of the respiratory system 10/09/2014    History of chronic sinusitis    Personal history of other diseases of the respiratory system 03/30/2022    History of acute bacterial sinusitis    Personal history of other diseases of the respiratory system 08/02/2017    History of acute bacterial sinusitis    Personal history of other diseases of the respiratory system 09/19/2016    History of sinusitis    Personal history of other specified conditions 10/10/2016    History of dizziness    Personal history of other specified conditions 11/06/2014    History of vertigo    Personal history of other specified conditions 03/28/2017    History of urinary hesitancy     Polyp of cecum 01/14/2019    Rectal abscess 11/01/2017    Perirectal cellulitis    Rectal abscess 05/15/2017    Perirectal abscess    Sensorineural hearing loss, bilateral 10/10/2016    Bilateral sensorineural hearing loss    Strain of muscle, fascia and tendon of lower back, initial encounter 11/30/2021    Lumbar strain    Testicular pain, unspecified 04/09/2019    Persistent testicular pain    Unspecified acute conjunctivitis, bilateral 03/28/2017    Conjunctivitis, acute, bilateral    Unspecified otitis externa, unspecified ear 03/12/2015    Otitis externa    Unspecified symptoms and signs involving the genitourinary system 12/21/2017    UTI symptoms        Surgical/family history  Family History   Problem Relation Name Age of Onset    Multiple myeloma Mother      Lung cancer Father      Multiple myeloma Brother      Other (sarcoma) Brother        Past Surgical History:   Procedure Laterality Date    LUMBAR LAMINECTOMY  10/17/2013    Laminectomy Lumbar    MR HEAD ANGIO WO IV CONTRAST  10/29/2013    MR HEAD ANGIO WO IV CONTRAST 10/29/2013 [facility] ANCILLARY LEGACY    MR HEAD ANGIO WO IV CONTRAST  10/18/2016    MR HEAD ANGIO WO IV CONTRAST 10/18/2016 [facility] ANCILLARY LEGACY    MR HEAD ANGIO WO IV CONTRAST  12/14/2022    MR HEAD ANGIO WO IV CONTRAST 12/14/2022 DOCTOR OFFICE LEGACY    MR NECK ANGIO WO IV CONTRAST  11/1/2019    MR NECK ANGIO WO IV CONTRAST 11/1/2019 [facility] CLINICAL LEGACY    MR NECK ANGIO WO IV CONTRAST  6/19/2021    MR NECK ANGIO WO IV CONTRAST 6/19/2021 [facility] ANCILLARY LEGACY    MR NECK ANGIO WO IV CONTRAST  11/8/2016    MR NECK ANGIO WO IV CONTRAST 11/8/2016 [facility] ANCILLARY LEGACY    MR NECK ANGIO WO IV CONTRAST  12/14/2022    MR NECK ANGIO WO IV CONTRAST 12/14/2022 DOCTOR OFFICE LEGACY    OTHER SURGICAL HISTORY  10/17/2013    Transurethral Resection Of Prostate    OTHER SURGICAL HISTORY  02/13/2014    Cardiac Cath Procedure Outcome: Successful        Social History  Tobacco Use: Low Risk  (12/18/2023)     "Patient History     Smoking Tobacco Use: Never     Smokeless Tobacco Use: Never     Passive Exposure: Never         Current med list:  Current Outpatient Medications   Medication Instructions    amoxicillin-pot clavulanate (Augmentin) 875-125 mg tablet 875 mg, oral, 2 times daily    aspirin 81 mg, oral, Daily    azelastine (Astelin) 137 mcg (0.1 %) nasal spray 2 sprays, nasal, 2 times daily    bumetanide (BUMEX) 1 mg, oral, As needed    butalbital-acetaminophen-caff -40 mg tablet Take one PO every 4-6 hours as needed.  Not to exceed 6 tabs per 24 hours    calcium 500 mg calcium (1,250 mg) tablet 1 tablet, oral, 2 times daily with meals    carboxymethylcellulose (THERATears) 0.25 % ophthalmic solution As needed    cholecalciferol (VITAMIN D-3) 1,000 Units, oral, Daily RT    clopidogrel (Plavix) 75 mg tablet 1 tablet, oral, Daily    ferrous fumarate-vitamin C (Trev-Sequeles 65-25) 1 tablet, oral, 3 times daily with meals, Do not crush, chew, or split.    gabapentin (NEURONTIN) 200 mg, oral, 3 times daily    hydrocortisone (Anusol-HC) 2.5 % rectal cream APPLY RECTALLY TWICE DAILY FOR 7 DAYS THEN AS NEEDED    icosapent ethyL (Vascepa) 1 gram capsule 2 capsules, oral, 2 times daily with meals, swallowing whole. Do not chew, open, dissolve and/or crush    leuprolide, 6-month, (Eligard) 45 mg injection     losartan (COZAAR) 25 mg, oral, Daily    multivitamin tablet PRN    omega-3/dha/epa/fish oil (OMEGA-3 FISH OIL ORAL) oral, Daily, 300 mg    pantoprazole (ProtoNix) 20 mg EC tablet 1 tablet, oral, Daily    pitavastatin calcium (Livalo) 2 mg tablet 1 tablet, oral, Daily    tamsulosin (FLOMAX) 0.4 mg, oral, Daily        Last recorded vital:  /52   Pulse 68   Temp 36.3 °C (97.3 °F) (Temporal)   Resp 18   Ht 1.753 m (5' 9\")   Wt 88.5 kg (195 lb 3.2 oz)   SpO2 95%   BMI 28.83 kg/m²     Physical exam  Physical Exam  Constitutional:       Appearance: Normal appearance.   Cardiovascular:      Rate and Rhythm: " Normal rate.   Pulmonary:      Effort: Pulmonary effort is normal.      Breath sounds: Normal breath sounds.   Musculoskeletal:         General: Normal range of motion.      Cervical back: Normal range of motion.   Neurological:      Mental Status: He is alert and oriented to person, place, and time.   Psychiatric:         Mood and Affect: Mood normal.         Behavior: Behavior normal.         Thought Content: Thought content normal.         Judgment: Judgment normal.         Pertinent labs:  Prostate Specific AG   Date/Time Value Ref Range Status   12/07/2023 11:40 AM 0.01 <=4.00 ng/mL Final         Dx:  Problem List Items Addressed This Visit       Prostate cancer (CMS/[facility]) - Primary    Relevant Orders    Clinic Appointment Request Follow Up; ALAN GENAO; [facility]    Gastroesophageal reflux disease without esophagitis    PSA of 0.01 was reviewed and is stable. Testerone <30 and is expected while on hormone therapy. Review of latent SE including rectal bleeding, hematuria, urinary strictures, ED where reviewed as well as how to contact office if s/s present. Denies latent SE. NCCN guidelines where reviewed and routine FUV of every 3m for first year and every 6m for four years for a total of five years was discussed. Patient verbalized understanding.          PLAN:  FUV 6m  Labs per med/onc  Imaging none  Flowmax daily  FUV other providers: PCP for routine evals, med/onc for systemic therapy        Please contact office with any concerns:  Alan Ochoa CNP  [phone]

## 2023-12-18 NOTE — PROGRESS NOTES
Chief Complaint: Follow up CKD    Recent pneumonia after trip in Sept  Dec ministroke  Stopped farxiga secondary to headaches  No specific complaints  Denies nausea, vomiting, chest pain, dyspnea  No new urinary symptoms  Home bps 120-130s systolic    NAD  Sclera AI s inj  MMM, no sores  Deferred secondary to COVID  No edema  No tremor  No rash    CKD stage 3b stable  HTN generally at goal  Proteinuria, none significant  Anemia not at point of req ДМИТРИЙ    Patient will decide if he wants to retry farxiga with increased hydration  If he wants to restart farxiga would hold off on losartan increase right now  Otherwise would increase losartan  Labs and follow up 3 months

## 2023-12-21 ENCOUNTER — APPOINTMENT (OUTPATIENT)
Dept: RADIATION ONCOLOGY | Facility: HOSPITAL | Age: 83
End: 2023-12-21
Payer: MEDICARE

## 2023-12-22 NOTE — PROGRESS NOTES
"Patient ID: Arcadio Han is a 83 y.o. male.    Cancer History:   Treatment Synopsis:    Referring Provider  Rico Ahuja - neurologist  Cardiology - Alejandra Craig  ENT - Idania Garcia / nephrology / [facility]      Prostate Cancer - High Risk , Local Prostate  Treatment  -elevated PSA 11.18, MRI 2/2022 - 2.7x2.5 right prostate, EPE, SV invasion, no LAD, 3/2022 - BS - negative, Prostate biopsy 3/22 - multiple cores of Gr Gr 4 , PNI, refused PET PSMA  -ADT / Eligard 3m 4/19/22  - SBRT to prostate and seminal vesicles, treatment Dates: 05/27/2022-06/08/2022  Radiation Dose Prescribed: 3750 cGy in 5 fractions, 750 cGy per fraction  -last ADT 10/3/23, eligard 3m , refused Ellyn/pred     Other history  CAD, trigger finger, obesity, HTN, HL, CKD, headaches.migraines, carotid stenosis, GERD, CEA 1/23, Stroke /CVA 12/22, Anemia elevated SFLC         Subjective    HPI  Seen in follow up for his prostate cancer. On ADT alone. Here with wife.     +hot flashes, feels a tingling sensation prior.  Using gabapentin tid. He thinks gabapentin cuts the intensity of the hot flash.  He also gets urge to urinate prior to hot flash. Also feels a bit \"foggy\" after hot flash. Doesn't happen with every hot flash. Doesn't last longer than 10 min.  He doesn't want to stop the injections.    Energy is coming back after having pneumonia 3 mos ago. Breathing feels better too. Can get back to sleep after hot flash.   Appetite is good. Trying to cut back/lose weight. Doing some exercise. Some weights/walk/desk bike. Cutting back on serving sizes/carbs.   Denies fever/cough.   Denies n/v.   Bowels move every couple days. Bowels feel normal. Taking iron from hematology.   Denies pain that is new or unusual or worsening or unexplainable.       Objective    BSA: 2.07 meters squared  /57   Pulse 60   Temp 36.4 °C (97.5 °F)   Resp 18   Wt 88.4 kg (194 lb 14.2 oz)   SpO2 95% "   BMI 28.78 kg/m²      Physical Exam  Vitals reviewed.   Constitutional:       Appearance: Normal appearance.   Eyes:      General: No scleral icterus.  Cardiovascular:      Rate and Rhythm: Normal rate and regular rhythm.      Heart sounds: Normal heart sounds.   Pulmonary:      Effort: Pulmonary effort is normal. No respiratory distress.      Breath sounds: Normal breath sounds. No wheezing or rales.   Abdominal:      General: Bowel sounds are normal. There is no distension.      Palpations: Abdomen is soft.      Tenderness: There is no abdominal tenderness. There is no guarding.   Musculoskeletal:      Right lower leg: No edema.      Left lower leg: No edema.   Skin:     General: Skin is warm and dry.   Neurological:      Mental Status: He is alert and oriented to person, place, and time.   Psychiatric:         Mood and Affect: Mood normal.         Behavior: Behavior normal.         Performance Status:  Symptomatic; fully ambulatory    Lab Results   Component Value Date    PSA 0.01 12/07/2023    PSA <0.10 10/03/2023    PSA <0.10 07/11/2023     Lab Results   Component Value Date    WBC 5.2 12/11/2023    HGB 10.8 (L) 12/11/2023    HCT 32.7 (L) 12/11/2023    MCV 91 12/11/2023     12/11/2023     Lab Results   Component Value Date    GLUCOSE 100 (H) 12/11/2023    CALCIUM 9.1 12/11/2023     12/11/2023    K 4.5 12/11/2023    CO2 21 12/11/2023     12/11/2023    BUN 40 (H) 12/11/2023    CREATININE 1.70 (H) 12/11/2023     Lab Results   Component Value Date    TESTOSTERONE <30 (L) 12/07/2023     Lab Results   Component Value Date    ALT 12 12/07/2023    AST 13 12/07/2023    ALKPHOS 114 12/07/2023    BILITOT 0.3 12/07/2023        Assessment/Plan     Next/last eligard injection today, to complete 2 yrs of therapy in 4/2024.   Follow up in 3 mos with labs and provider visit.   Commended him on exercising/purposeful weight loss.            Diagnoses and all orders for this visit:  Adenocarcinoma of prostate  (CMS/[facility])  -     Clinic Appointment Request Follow up; ERIKA MIRANDA  -     Clinic Appointment Request Virtual Est; CHRIS ZHANG (or [place]); Future           AURA Oseguera-CNP

## 2023-12-26 ENCOUNTER — OFFICE VISIT (OUTPATIENT)
Dept: HEMATOLOGY/ONCOLOGY | Facility: CLINIC | Age: 83
End: 2023-12-26
Payer: MEDICARE

## 2023-12-26 ENCOUNTER — INFUSION (OUTPATIENT)
Dept: HEMATOLOGY/ONCOLOGY | Facility: CLINIC | Age: 83
End: 2023-12-26
Payer: MEDICARE

## 2023-12-26 ENCOUNTER — APPOINTMENT (OUTPATIENT)
Dept: HEMATOLOGY/ONCOLOGY | Facility: CLINIC | Age: 83
End: 2023-12-26
Payer: MEDICARE

## 2023-12-26 VITALS
BODY MASS INDEX: 28.78 KG/M2 | TEMPERATURE: 97.5 F | SYSTOLIC BLOOD PRESSURE: 135 MMHG | DIASTOLIC BLOOD PRESSURE: 57 MMHG | RESPIRATION RATE: 18 BRPM | OXYGEN SATURATION: 95 % | WEIGHT: 194.89 LBS | HEART RATE: 60 BPM

## 2023-12-26 DIAGNOSIS — C61 ADENOCARCINOMA OF PROSTATE (MULTI): ICD-10-CM

## 2023-12-26 PROCEDURE — 1160F RVW MEDS BY RX/DR IN RCRD: CPT | Performed by: NURSE PRACTITIONER

## 2023-12-26 PROCEDURE — 3078F DIAST BP <80 MM HG: CPT | Performed by: NURSE PRACTITIONER

## 2023-12-26 PROCEDURE — 99214 OFFICE O/P EST MOD 30 MIN: CPT | Performed by: NURSE PRACTITIONER

## 2023-12-26 PROCEDURE — 1126F AMNT PAIN NOTED NONE PRSNT: CPT | Performed by: NURSE PRACTITIONER

## 2023-12-26 PROCEDURE — 1159F MED LIST DOCD IN RCRD: CPT | Performed by: NURSE PRACTITIONER

## 2023-12-26 PROCEDURE — 3075F SYST BP GE 130 - 139MM HG: CPT | Performed by: NURSE PRACTITIONER

## 2023-12-26 PROCEDURE — 96402 CHEMO HORMON ANTINEOPL SQ/IM: CPT

## 2023-12-26 PROCEDURE — 1036F TOBACCO NON-USER: CPT | Performed by: NURSE PRACTITIONER

## 2023-12-26 PROCEDURE — 2500000004 HC RX 250 GENERAL PHARMACY W/ HCPCS (ALT 636 FOR OP/ED): Mod: JZ,JG | Performed by: NURSE PRACTITIONER

## 2023-12-26 RX ORDER — DIPHENHYDRAMINE HYDROCHLORIDE 50 MG/ML
50 INJECTION INTRAMUSCULAR; INTRAVENOUS AS NEEDED
Status: DISCONTINUED | OUTPATIENT
Start: 2023-12-26 | End: 2023-12-26 | Stop reason: HOSPADM

## 2023-12-26 RX ORDER — DIPHENHYDRAMINE HYDROCHLORIDE 50 MG/ML
50 INJECTION INTRAMUSCULAR; INTRAVENOUS AS NEEDED
OUTPATIENT
Start: 2024-03-19

## 2023-12-26 RX ORDER — EPINEPHRINE 0.3 MG/.3ML
0.3 INJECTION SUBCUTANEOUS EVERY 5 MIN PRN
OUTPATIENT
Start: 2024-03-19

## 2023-12-26 RX ORDER — FAMOTIDINE 10 MG/ML
20 INJECTION INTRAVENOUS ONCE AS NEEDED
OUTPATIENT
Start: 2024-03-19

## 2023-12-26 RX ORDER — ALBUTEROL SULFATE 0.83 MG/ML
3 SOLUTION RESPIRATORY (INHALATION) AS NEEDED
OUTPATIENT
Start: 2024-03-19

## 2023-12-26 RX ORDER — ALBUTEROL SULFATE 0.83 MG/ML
3 SOLUTION RESPIRATORY (INHALATION) AS NEEDED
Status: DISCONTINUED | OUTPATIENT
Start: 2023-12-26 | End: 2023-12-26 | Stop reason: HOSPADM

## 2023-12-26 RX ORDER — FAMOTIDINE 10 MG/ML
20 INJECTION INTRAVENOUS ONCE AS NEEDED
Status: DISCONTINUED | OUTPATIENT
Start: 2023-12-26 | End: 2023-12-26 | Stop reason: HOSPADM

## 2023-12-26 RX ORDER — EPINEPHRINE 0.3 MG/.3ML
0.3 INJECTION SUBCUTANEOUS EVERY 5 MIN PRN
Status: DISCONTINUED | OUTPATIENT
Start: 2023-12-26 | End: 2023-12-26 | Stop reason: HOSPADM

## 2023-12-26 RX ADMIN — LEUPROLIDE ACETATE 22.5 MG: KIT at 12:12

## 2023-12-26 ASSESSMENT — PAIN SCALES - GENERAL: PAINLEVEL: 0-NO PAIN

## 2024-01-08 ENCOUNTER — PATIENT MESSAGE (OUTPATIENT)
Dept: ORTHOPEDIC SURGERY | Facility: CLINIC | Age: 84
End: 2024-01-08

## 2024-01-09 ENCOUNTER — OFFICE VISIT (OUTPATIENT)
Dept: NEUROLOGY | Facility: CLINIC | Age: 84
End: 2024-01-09
Payer: MEDICARE

## 2024-01-09 VITALS
HEART RATE: 61 BPM | SYSTOLIC BLOOD PRESSURE: 149 MMHG | WEIGHT: 193 LBS | BODY MASS INDEX: 28.58 KG/M2 | HEIGHT: 69 IN | DIASTOLIC BLOOD PRESSURE: 64 MMHG

## 2024-01-09 DIAGNOSIS — Z86.73 HISTORY OF STROKE: ICD-10-CM

## 2024-01-09 DIAGNOSIS — G43.109 MIGRAINE WITH AURA AND WITHOUT STATUS MIGRAINOSUS, NOT INTRACTABLE: Primary | ICD-10-CM

## 2024-01-09 DIAGNOSIS — G56.01 CARPAL TUNNEL SYNDROME OF RIGHT WRIST: ICD-10-CM

## 2024-01-09 PROCEDURE — 3077F SYST BP >= 140 MM HG: CPT | Performed by: PSYCHIATRY & NEUROLOGY

## 2024-01-09 PROCEDURE — 1036F TOBACCO NON-USER: CPT | Performed by: PSYCHIATRY & NEUROLOGY

## 2024-01-09 PROCEDURE — 3078F DIAST BP <80 MM HG: CPT | Performed by: PSYCHIATRY & NEUROLOGY

## 2024-01-09 PROCEDURE — 99214 OFFICE O/P EST MOD 30 MIN: CPT | Performed by: PSYCHIATRY & NEUROLOGY

## 2024-01-09 PROCEDURE — 1126F AMNT PAIN NOTED NONE PRSNT: CPT | Performed by: PSYCHIATRY & NEUROLOGY

## 2024-01-09 NOTE — PATIENT INSTRUCTIONS
I'm glad to hear that you have been feeling better since being treated for pneumonia in September.    We discussed that if gabapentin at the present dose of 200 mg 3 times daily is making you feel a bit dizzy, you might try reducing 1 or more doses to 100 mg.  If the electrical sensations from Eligard get worse at the lower dose, you can increase back to 200 mg 3 times daily.    We discussed increasing co-Q10 to 200 mg daily which is the more usual dose for migraine prevention.    You should continue Plavix for secondary stroke prevention.    Please see me in the office in 8 months.

## 2024-01-09 NOTE — PROGRESS NOTES
Subjective     Dago Han is a 83 y.o. year old male seen in follow-up for migraine with visual aura, right carotid distribution ischemic stroke status post CEA in January 2023, right carpal tunnel syndrome.    HPI    83-year-old right-handed  man with past medical history significant for hypertension, cervical and lumbar stenosis status post lumbar spine surgery, prostate cancer, BPH status post TURP, migraine with visual aura.     I have followed him since 2013. He has migraine visual auras accompanied by mild after going headache, a phenomenon he has noted since roughly age 51. In August 2013 he had a more severe headache that was followed by vertical diplopia. Brain MRI at the time showed a tiny ischemic stroke in the right dorsal rostral midbrain. I started him on aspirin. He has not had clinical events to suggest recurrent stroke since then.     More recently I have seen him for headaches, not necessarily approaching migrainous intensity. He had difficulty tolerating gabapentin at doses higher than 100 mg twice a day, and ultimately concluded that even that low-dose was interfering with his bladder emptying. Accordingly he came off of it.      He additionally has a 50% right cervical ICA stenosis by MRA, and a moderate left MCA stenosis. He has been on aspirin and Crestor for this.      At a visit in August 2018 he indicated concerns about his cervical spine. He was noting ongoing neck and shoulder girdle discomfort as well as a few episodes lasting only 30 seconds to 2 minutes, of both arms feeling very weak. I sent him for a cervical spine MRI which showed some modest progression of spondylosis including cord deformation compared to prior imaging, and referred him to spine surgery for a consultation. He saw Dr. Rico Ulrich in late 2018, and a 4 level procedure with instrumentation was proposed, but he decided against going through with surgery.     I evaluated him again on 8/30/2022 in the  office. Interval history was notable for 4 or 5 episodes over the preceding month of brief binocular visual disturbance followed by after going headache. From a medical standpoint he was status post a short radiation course for prostate cancer and had been started on Eligard. There was marked reduction in PSA. He noted hot flashes since starting on Eligard for which gabapentin or an antidepressant had been proposed.    I evaluated him most recently on 4/27/2023 in the office.  He was status post right carotid endarterectomy in late January 2023 after having been found in December 2022 to have a small burden of scattered subacute stroke in the right ICA territory with high-grade stenosis of the right ICA by CTA, MRI and ultrasound.  He was doing well without evidence of recurrent stroke or TIA.  Migraine control was reasonable on riboflavin and gabapentin.  He was becoming more symptomatic of right carpal tunnel syndrome, for which I recommended re-consultation with hand surgery.    He is evaluated again today in the office, accompanied by his wife.    He continues on Plavix and has noted no recurrence of stroke symptoms nor symptoms of TIA.  I reviewed the report of his most recent carotid ultrasound, from 8/14/2023, showing less than 50% bilateral ICA stenosis.    He is doing much better regarding migraine.  He still has occasional modest headaches and has had some 10-minute long visual aura like episodes without accompanying headache, but accompanied by photophobia.  He has not however been having debilitating headaches.  He lapsed off riboflavin but has been taking co-Q10 albeit at a low dose of 100 mg daily.    He continues on gabapentin 200 mg 3 times daily.  At times he associates it with a bit of dizziness but his wife has the definite impression that it has reduced his migraine activity.  It also helps to some extent with electrical dysesthesias in the fingertips that he attributes to Eligard.    He is  actually scheduled for carpal tunnel surgery next week but thinks he may need to reschedule because it has been difficult getting all the preoperative testing done in timely fashion, and he has not yet stopped Plavix.    Interval medical history is notable for hospitalization in [place] state in September for pneumonia.  He was vacationing there with his wife when he experienced relatively abrupt onset dyspnea.  He presented to a local hospital where he was found to have pulmonary congestion and pneumonia.  He improved with diuresis and antibiotics.  There was some question at the outside hospital of CHF but he indicates being told by his cardiologist in [place] that he does not have CHF.  In any event he feels much better in terms of breathing and general sense of wellbeing since recovering from that episode.    Review of Systems    As per the history of present illness    Patient Active Problem List   Diagnosis    Prostate cancer (CMS/HCC)    Coronary artery disease involving native coronary artery of native heart without angina pectoris    Gastroesophageal reflux disease without esophagitis    Stage 3 chronic kidney disease (CMS/HCC)    Renovascular hypertension    Migraine    Benign neoplasm of prostate    Adenocarcinoma of prostate (CMS/HCC)    Migraine with aura and without status migrainosus, not intractable    Tinnitus    Stroke (CMS/HCC)    Multiple thyroid nodules    Lipoma of neck    Spinal stenosis of cervical region with radiculopathy    Left thyroid nodule    Mixed hyperlipidemia    Gout    Essential hypertension    Carpal tunnel syndrome of right wrist    BPH associated with nocturia    SCC (squamous cell carcinoma)    B-complex deficiency    Statin intolerance    Overweight with body mass index (BMI) of 26 to 26.9 in adult    Carpal tunnel syndrome on right     Past Medical History:   Diagnosis Date    Acute bronchitis due to other specified organisms 09/05/2019    Acute bacterial bronchitis     Acute frontal sinusitis, unspecified 02/28/2018    Acute frontal sinusitis    Allergy status to unspecified drugs, medicaments and biological substances 08/19/2016    History of adverse drug reaction    Anal fistula     Anal fistula    Atherosclerotic heart disease of native coronary artery without angina pectoris 12/22/2022    Atherosclerotic heart disease of native coronary artery without angina pectoris    Bruit of right carotid artery 08/22/2023    Candidiasis of skin and nail 12/06/2017    Yeast dermatitis    Carotid artery obstruction, 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    Carotid stenosis, asymptomatic, right 08/22/2023    Cellulitis of unspecified toe 02/12/2016    Paronychia of toe    Chronic sinusitis, unspecified 09/26/2019    Sinobronchitis    Contact with and (suspected) exposure to unspecified communicable disease 06/21/2017    Exposure to communicable disease    Essential (primary) hypertension 09/12/2022    Essential hypertension    Headache, unspecified 02/27/2018    Chronic daily headache    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    Anemia due to blood loss    Lumbago with sciatica, right side 05/16/2019    Chronic right-sided low back pain with right-sided sciatica    Mixed hyperlipidemia 02/28/2022    Mixed hyperlipidemia    Occlusion and stenosis of bilateral carotid arteries     Carotid artery obstruction, bilateral    Olecranon bursitis, left elbow 12/06/2017    Olecranon bursitis of left elbow    Other abnormalities of gait and mobility 10/15/2019    Imbalance    Other allergic rhinitis 04/26/2016    Perennial allergic rhinitis    Other symptoms and signs involving general sensations and perceptions 10/05/2016    Sensation of pressure in face    Other symptoms and signs involving the musculoskeletal system 08/24/2018    Arm weakness    Pain in right ankle and joints of right foot 03/23/2018    Right ankle pain    Pain in unspecified foot 03/11/2015    Foot pain    Pain in  unspecified hand 07/01/2014    Hand pain    Personal history of diseases of the skin and subcutaneous tissue 03/11/2015    History of onychia and paronychia    Personal history of other diseases of male genital organs 09/06/2016    History of acute prostatitis    Personal history of other diseases of male genital organs 03/05/2014    History of prostatitis    Personal history of other diseases of male genital organs 06/28/2016    History of benign prostatic hyperplasia    Personal history of other diseases of the musculoskeletal system and connective tissue 02/27/2018    History of muscle pain    Personal history of other diseases of the nervous system and sense organs 10/17/2013    History of diplopia    Personal history of other diseases of the nervous system and sense organs 11/14/2014    History of carpal tunnel syndrome    Personal history of other diseases of the respiratory system 09/24/2016    History of chronic sinusitis    Personal history of other diseases of the respiratory system 10/09/2014    History of chronic sinusitis    Personal history of other diseases of the respiratory system 03/30/2022    History of acute bacterial sinusitis    Personal history of other diseases of the respiratory system 08/02/2017    History of acute bacterial sinusitis    Personal history of other diseases of the respiratory system 09/19/2016    History of sinusitis    Personal history of other specified conditions 10/10/2016    History of dizziness    Personal history of other specified conditions 11/06/2014    History of vertigo    Personal history of other specified conditions 03/28/2017    History of urinary hesitancy    Polyp of cecum 01/14/2019    Rectal abscess 11/01/2017    Perirectal cellulitis    Rectal abscess 05/15/2017    Perirectal abscess    Sensorineural hearing loss, bilateral 10/10/2016    Bilateral sensorineural hearing loss    Strain of muscle, fascia and tendon of lower back, initial encounter 11/30/2021     Lumbar strain    Testicular pain, unspecified 04/09/2019    Persistent testicular pain    Unspecified acute conjunctivitis, bilateral 03/28/2017    Conjunctivitis, acute, bilateral    Unspecified otitis externa, unspecified ear 03/12/2015    Otitis externa    Unspecified symptoms and signs involving the genitourinary system 12/21/2017    UTI symptoms     Past Surgical History:   Procedure Laterality Date    LUMBAR LAMINECTOMY  10/17/2013    Laminectomy Lumbar    MR HEAD ANGIO WO IV CONTRAST  10/29/2013    MR HEAD ANGIO WO IV CONTRAST 10/29/2013 [facility]    MR HEAD ANGIO WO IV CONTRAST  10/18/2016    MR HEAD ANGIO WO IV CONTRAST 10/18/2016 [facility]    MR HEAD ANGIO WO IV CONTRAST  12/14/2022    MR HEAD ANGIO WO IV CONTRAST 12/14/2022 [facility] OFFICE LEGAC[facility]    MR NECK ANGIO WO IV CONTRAST  11/1/2019    MR NECK ANGIO WO IV CONTRAST 11/1/2019 [facility]GAC[facility]    MR NECK ANGIO WO IV CONTRAST  6/19/2021    MR NECK ANGIO WO IV CONTRAST 6/19/2021 [facility]    MR NECK ANGIO WO IV CONTRAST  11/8/2016    MR NECK ANGIO WO IV CONTRAST 11/8/2016 [facility]    MR NECK ANGIO WO IV CONTRAST  12/14/2022    MR NECK ANGIO WO IV CONTRAST 12/14/2022 [facility] OFFICE LEGACY    OTHER SURGICAL HISTORY  10/17/2013    Transurethral Resection Of Prostate    OTHER SURGICAL HISTORY  02/13/2014    Cardiac Cath Procedure Outcome: Successful     Social History     Tobacco Use    Smoking status: Never     Passive exposure: Never    Smokeless tobacco: Never   Substance Use Topics    Alcohol use: Never     family history includes Lung cancer in his father; Multiple myeloma in his brother and mother; sarcoma in his brother.    Current Outpatient Medications:     amoxicillin-pot clavulanate (Augmentin) 875-125 mg tablet, Take 1 tablet (875 mg) by mouth 2 times a day., Disp: 10 tablet, Rfl: 0    aspirin 81 mg capsule, Take 81 mg by mouth once daily., Disp: , Rfl:     azelastine (Astelin) 137 mcg (0.1 %)  nasal spray, Administer 2 sprays into affected nostril(s) twice a day., Disp: , Rfl:     bumetanide (Bumex) 1 mg tablet, Take 1 tablet (1 mg) by mouth if needed (edema, 3lb weight gain in 24 hours)., Disp: , Rfl:     butalbital-acetaminophen-caff -40 mg tablet, Take one PO every 4-6 hours as needed.  Not to exceed 6 tabs per 24 hours, Disp: 120 tablet, Rfl: 0    calcium 500 mg calcium (1,250 mg) tablet, Take 1 tablet (1,250 mg) by mouth 2 times a day with meals., Disp: , Rfl:     carboxymethylcellulose (THERATears) 0.25 % ophthalmic solution, if needed for dry eyes., Disp: , Rfl:     cholecalciferol (Vitamin D-3) 25 MCG (1000 UT) capsule, Take 1 capsule (25 mcg) by mouth once daily., Disp: , Rfl:     clopidogrel (Plavix) 75 mg tablet, Take 1 tablet (75 mg) by mouth once daily., Disp: , Rfl:     ferrous fumarate-vitamin C (Trev-Sequeles 65-25), Take 1 tablet by mouth 3 times a day with meals. Do not crush, chew, or split., Disp: , Rfl:     gabapentin (Neurontin) 100 mg capsule, Take 2 capsules (200 mg) by mouth 3 times a day., Disp: 180 capsule, Rfl: 3    hydrocortisone (Anusol-HC) 2.5 % rectal cream, APPLY RECTALLY TWICE DAILY FOR 7 DAYS THEN AS NEEDED, Disp: , Rfl:     icosapent ethyL (Vascepa) 1 gram capsule, Take 2 capsules (2 g) by mouth 2 times a day with meals. swallowing whole. Do not chew, open, dissolve and/or crush, Disp: , Rfl:     leuprolide, 6-month, (Eligard) 45 mg injection, , Disp: , Rfl:     losartan (Cozaar) 25 mg tablet, Take 1 tablet (25 mg) by mouth once daily., Disp: , Rfl:     multivitamin tablet, PRN, Disp: , Rfl:     omega-3/dha/epa/fish oil (OMEGA-3 FISH OIL ORAL), Take by mouth once daily. 300 mg, Disp: , Rfl:     pantoprazole (ProtoNix) 20 mg EC tablet, Take 1 tablet (20 mg) by mouth once daily., Disp: , Rfl:     pitavastatin calcium (Livalo) 2 mg tablet, Take 1 tablet by mouth once daily., Disp: , Rfl:     tamsulosin (Flomax) 0.4 mg 24 hr capsule, Take 1 capsule (0.4 mg) by mouth  once daily., Disp: , Rfl:   Allergies   Allergen Reactions    Other Headache     chocolate    Lidocaine Itching    Monosodium Glutamate Unknown       Objective   Neurological Exam  Physical Exam    Physical Examination:    General: Alert man who was ambulatory without assistive devices.      Mental Status: Clear sensorium without fluctuation.  Appropriate and oriented in conversation.  Did command of details of medical history.  Fluent unremarkable speech without paraphasic errors or hesitancy.    Cranial Nerves:  Funduscopic exam was not well visualized bilaterally on nondilated exam.  Pupils were equal, round and reactive to light with no relative afferent pupillary defect.  Extraocular movements were intact and conjugate without nystagmus.  No ptosis.  Visual fields were full to confrontation tested binocularly.  Facial sensation was symmetric to pin over the forehead, asymmetric (slightly sharper left) over V2.  Facial motor function was symmetrically intact.  Hearing was grossly intact.  No dysarthria.  Shoulder shrug was symmetric.  Tongue protrusion was midline.    Motor: Muscle bulk and tone were normal throughout including thenar bulks.  There was no pronator drift or asymmetry of finger taps. Confrontation strength was symmetrically 5/5 throughout the upper and lower extremities.     Sensation: Pin was diminished over the right median digits compared to the left hand compared to the right ulnar digits.  Romberg sign was present on trials 1 and 2, absent on trial 3.    Station: Intact and stable.    Gait: Stable and unremarkable.  He was unable to tandem without holding onto the wall.    Assessment/Plan     He appears neurologically stable.    Migraine control is relatively good on gabapentin and co-Q10.  He has lapsed off riboflavin.  He does feel that gabapentin has helped significantly.  I suggested increasing co-Q10 to 200 mg daily if he desires additional control.    He has had no interval strokelike  episodes.  Recent carotid ultrasound was without significant stenosis, status post right CEA.  He will continue Plavix.  Blood pressure is mildly elevated today as we discussed and he will continue to monitor this with his primary doctor.    He is anticipating carpal tunnel surgery.    I advised him to follow-up in the office in 8 months.

## 2024-01-10 ENCOUNTER — TELEPHONE (OUTPATIENT)
Dept: CARDIOLOGY | Facility: CLINIC | Age: 84
End: 2024-01-10

## 2024-01-10 ENCOUNTER — TELEMEDICINE CLINICAL SUPPORT (OUTPATIENT)
Dept: PREADMISSION TESTING | Facility: HOSPITAL | Age: 84
End: 2024-01-10
Payer: MEDICARE

## 2024-01-10 ENCOUNTER — APPOINTMENT (OUTPATIENT)
Dept: PRIMARY CARE | Facility: CLINIC | Age: 84
End: 2024-01-10
Payer: MEDICARE

## 2024-01-10 DIAGNOSIS — G56.01 CARPAL TUNNEL SYNDROME ON RIGHT: Primary | ICD-10-CM

## 2024-01-10 NOTE — NURSING NOTE
Pt was unsure if he is going to proceed with surgery (with Dr. Ventura on 1-17-24 for right CTR) at this time. He voiced many concerns and stated that he has been trying to reach Dr. Ventura to discuss but has not been able to reach him. Secure message sent to Dr. Ventura, Dr. Betancourt, and Dr. Rowland to relay patients issues/ concerns. Message received from Dr. Ventura that he will have office call patient.

## 2024-01-15 ENCOUNTER — ANESTHESIA EVENT (OUTPATIENT)
Dept: OPERATING ROOM | Facility: HOSPITAL | Age: 84
End: 2024-01-15
Payer: MEDICARE

## 2024-01-17 ENCOUNTER — HOSPITAL ENCOUNTER (OUTPATIENT)
Facility: HOSPITAL | Age: 84
Setting detail: OUTPATIENT SURGERY
Discharge: HOME | End: 2024-01-17
Attending: ORTHOPAEDIC SURGERY | Admitting: ORTHOPAEDIC SURGERY
Payer: MEDICARE

## 2024-01-17 ENCOUNTER — ANESTHESIA (OUTPATIENT)
Dept: OPERATING ROOM | Facility: HOSPITAL | Age: 84
End: 2024-01-17
Payer: MEDICARE

## 2024-01-17 VITALS
HEART RATE: 57 BPM | WEIGHT: 194.22 LBS | HEIGHT: 69 IN | TEMPERATURE: 97.5 F | SYSTOLIC BLOOD PRESSURE: 144 MMHG | OXYGEN SATURATION: 100 % | RESPIRATION RATE: 16 BRPM | DIASTOLIC BLOOD PRESSURE: 60 MMHG | BODY MASS INDEX: 28.77 KG/M2

## 2024-01-17 DIAGNOSIS — G56.01 CARPAL TUNNEL SYNDROME ON RIGHT: Primary | ICD-10-CM

## 2024-01-17 PROCEDURE — 7100000002 HC RECOVERY ROOM TIME - EACH INCREMENTAL 1 MINUTE: Performed by: ORTHOPAEDIC SURGERY

## 2024-01-17 PROCEDURE — 99100 ANES PT EXTEME AGE<1 YR&>70: CPT | Performed by: ANESTHESIOLOGY

## 2024-01-17 PROCEDURE — 88305 TISSUE EXAM BY PATHOLOGIST: CPT | Performed by: SPECIALIST

## 2024-01-17 PROCEDURE — A64721 PR REVISE MEDIAN N/CARPAL TUNNEL SURG: Performed by: ANESTHESIOLOGY

## 2024-01-17 PROCEDURE — 3600000003 HC OR TIME - INITIAL BASE CHARGE - PROCEDURE LEVEL THREE: Performed by: ORTHOPAEDIC SURGERY

## 2024-01-17 PROCEDURE — 7100000001 HC RECOVERY ROOM TIME - INITIAL BASE CHARGE: Performed by: ORTHOPAEDIC SURGERY

## 2024-01-17 PROCEDURE — 7100000010 HC PHASE TWO TIME - EACH INCREMENTAL 1 MINUTE: Performed by: ORTHOPAEDIC SURGERY

## 2024-01-17 PROCEDURE — 88313 SPECIAL STAINS GROUP 2: CPT | Performed by: SPECIALIST

## 2024-01-17 PROCEDURE — 2500000005 HC RX 250 GENERAL PHARMACY W/O HCPCS: Performed by: ORTHOPAEDIC SURGERY

## 2024-01-17 PROCEDURE — 2500000004 HC RX 250 GENERAL PHARMACY W/ HCPCS (ALT 636 FOR OP/ED): Performed by: NURSE ANESTHETIST, CERTIFIED REGISTERED

## 2024-01-17 PROCEDURE — 0753T DGTZ GLS MCRSCP SLD LEVEL IV: CPT | Mod: TC | Performed by: ORTHOPAEDIC SURGERY

## 2024-01-17 PROCEDURE — 3600000008 HC OR TIME - EACH INCREMENTAL 1 MINUTE - PROCEDURE LEVEL THREE: Performed by: ORTHOPAEDIC SURGERY

## 2024-01-17 PROCEDURE — 2500000004 HC RX 250 GENERAL PHARMACY W/ HCPCS (ALT 636 FOR OP/ED): Performed by: ORTHOPAEDIC SURGERY

## 2024-01-17 PROCEDURE — 2720000007 HC OR 272 NO HCPCS: Performed by: ORTHOPAEDIC SURGERY

## 2024-01-17 PROCEDURE — 7100000009 HC PHASE TWO TIME - INITIAL BASE CHARGE: Performed by: ORTHOPAEDIC SURGERY

## 2024-01-17 PROCEDURE — A64721 PR REVISE MEDIAN N/CARPAL TUNNEL SURG: Performed by: NURSE ANESTHETIST, CERTIFIED REGISTERED

## 2024-01-17 PROCEDURE — 3700000002 HC GENERAL ANESTHESIA TIME - EACH INCREMENTAL 1 MINUTE: Performed by: ORTHOPAEDIC SURGERY

## 2024-01-17 PROCEDURE — 3700000001 HC GENERAL ANESTHESIA TIME - INITIAL BASE CHARGE: Performed by: ORTHOPAEDIC SURGERY

## 2024-01-17 PROCEDURE — 64721 CARPAL TUNNEL SURGERY: CPT | Performed by: ORTHOPAEDIC SURGERY

## 2024-01-17 RX ORDER — SODIUM CHLORIDE, SODIUM LACTATE, POTASSIUM CHLORIDE, CALCIUM CHLORIDE 600; 310; 30; 20 MG/100ML; MG/100ML; MG/100ML; MG/100ML
50 INJECTION, SOLUTION INTRAVENOUS CONTINUOUS
Status: DISCONTINUED | OUTPATIENT
Start: 2024-01-17 | End: 2024-01-17 | Stop reason: HOSPADM

## 2024-01-17 RX ORDER — PROPOFOL 10 MG/ML
INJECTION, EMULSION INTRAVENOUS AS NEEDED
Status: DISCONTINUED | OUTPATIENT
Start: 2024-01-17 | End: 2024-01-17

## 2024-01-17 RX ORDER — FENTANYL CITRATE 50 UG/ML
INJECTION, SOLUTION INTRAMUSCULAR; INTRAVENOUS AS NEEDED
Status: DISCONTINUED | OUTPATIENT
Start: 2024-01-17 | End: 2024-01-17

## 2024-01-17 RX ORDER — BUPIVACAINE HYDROCHLORIDE 5 MG/ML
INJECTION, SOLUTION PERINEURAL AS NEEDED
Status: DISCONTINUED | OUTPATIENT
Start: 2024-01-17 | End: 2024-01-17 | Stop reason: HOSPADM

## 2024-01-17 RX ORDER — DIPHENHYDRAMINE HYDROCHLORIDE 50 MG/ML
12.5 INJECTION INTRAMUSCULAR; INTRAVENOUS ONCE AS NEEDED
Status: DISCONTINUED | OUTPATIENT
Start: 2024-01-17 | End: 2024-01-17 | Stop reason: HOSPADM

## 2024-01-17 RX ORDER — SODIUM CHLORIDE, SODIUM LACTATE, POTASSIUM CHLORIDE, CALCIUM CHLORIDE 600; 310; 30; 20 MG/100ML; MG/100ML; MG/100ML; MG/100ML
40 INJECTION, SOLUTION INTRAVENOUS CONTINUOUS
Status: DISCONTINUED | OUTPATIENT
Start: 2024-01-17 | End: 2024-01-17 | Stop reason: HOSPADM

## 2024-01-17 RX ORDER — MEPERIDINE HYDROCHLORIDE 25 MG/ML
12.5 INJECTION INTRAMUSCULAR; INTRAVENOUS; SUBCUTANEOUS EVERY 10 MIN PRN
Status: DISCONTINUED | OUTPATIENT
Start: 2024-01-17 | End: 2024-01-17 | Stop reason: HOSPADM

## 2024-01-17 RX ORDER — FENTANYL CITRATE 50 UG/ML
50 INJECTION, SOLUTION INTRAMUSCULAR; INTRAVENOUS EVERY 5 MIN PRN
Status: DISCONTINUED | OUTPATIENT
Start: 2024-01-17 | End: 2024-01-17 | Stop reason: HOSPADM

## 2024-01-17 RX ORDER — IPRATROPIUM BROMIDE 0.5 MG/2.5ML
500 SOLUTION RESPIRATORY (INHALATION) EVERY 30 MIN PRN
Status: DISCONTINUED | OUTPATIENT
Start: 2024-01-17 | End: 2024-01-17 | Stop reason: HOSPADM

## 2024-01-17 RX ORDER — ALBUTEROL SULFATE 0.83 MG/ML
2.5 SOLUTION RESPIRATORY (INHALATION) EVERY 30 MIN PRN
Status: DISCONTINUED | OUTPATIENT
Start: 2024-01-17 | End: 2024-01-17 | Stop reason: HOSPADM

## 2024-01-17 RX ORDER — HYDROCODONE BITARTRATE AND ACETAMINOPHEN 5; 325 MG/1; MG/1
1 TABLET ORAL EVERY 6 HOURS PRN
Qty: 12 TABLET | Refills: 0 | Status: SHIPPED | OUTPATIENT
Start: 2024-01-17 | End: 2024-01-24

## 2024-01-17 RX ORDER — CEFAZOLIN SODIUM 2 G/100ML
2 INJECTION, SOLUTION INTRAVENOUS ONCE
Status: COMPLETED | OUTPATIENT
Start: 2024-01-17 | End: 2024-01-17

## 2024-01-17 RX ORDER — LABETALOL HYDROCHLORIDE 5 MG/ML
5 INJECTION, SOLUTION INTRAVENOUS EVERY 5 MIN PRN
Status: DISCONTINUED | OUTPATIENT
Start: 2024-01-17 | End: 2024-01-17 | Stop reason: HOSPADM

## 2024-01-17 RX ORDER — ONDANSETRON HYDROCHLORIDE 2 MG/ML
4 INJECTION, SOLUTION INTRAVENOUS ONCE AS NEEDED
Status: DISCONTINUED | OUTPATIENT
Start: 2024-01-17 | End: 2024-01-17 | Stop reason: HOSPADM

## 2024-01-17 RX ADMIN — SODIUM CHLORIDE, POTASSIUM CHLORIDE, SODIUM LACTATE AND CALCIUM CHLORIDE: 600; 310; 30; 20 INJECTION, SOLUTION INTRAVENOUS at 08:31

## 2024-01-17 RX ADMIN — CEFAZOLIN SODIUM 2 G: 2 INJECTION, SOLUTION INTRAVENOUS at 08:33

## 2024-01-17 RX ADMIN — PROPOFOL 150 MG: 10 INJECTION, EMULSION INTRAVENOUS at 08:37

## 2024-01-17 RX ADMIN — FENTANYL CITRATE 50 MCG: 50 INJECTION INTRAMUSCULAR; INTRAVENOUS at 09:05

## 2024-01-17 RX ADMIN — FENTANYL CITRATE 25 MCG: 50 INJECTION INTRAMUSCULAR; INTRAVENOUS at 09:00

## 2024-01-17 RX ADMIN — FENTANYL CITRATE 25 MCG: 50 INJECTION INTRAMUSCULAR; INTRAVENOUS at 08:39

## 2024-01-17 RX ADMIN — PROPOFOL 100 MG: 10 INJECTION, EMULSION INTRAVENOUS at 08:36

## 2024-01-17 SDOH — HEALTH STABILITY: MENTAL HEALTH: CURRENT SMOKER: 0

## 2024-01-17 ASSESSMENT — PAIN SCALES - GENERAL
PAINLEVEL_OUTOF10: 0 - NO PAIN
PAINLEVEL_OUTOF10: 0 - NO PAIN
PAIN_LEVEL: 0
PAINLEVEL_OUTOF10: 0 - NO PAIN

## 2024-01-17 ASSESSMENT — PAIN - FUNCTIONAL ASSESSMENT
PAIN_FUNCTIONAL_ASSESSMENT: 0-10

## 2024-01-17 ASSESSMENT — COLUMBIA-SUICIDE SEVERITY RATING SCALE - C-SSRS
1. IN THE PAST MONTH, HAVE YOU WISHED YOU WERE DEAD OR WISHED YOU COULD GO TO SLEEP AND NOT WAKE UP?: NO
6. HAVE YOU EVER DONE ANYTHING, STARTED TO DO ANYTHING, OR PREPARED TO DO ANYTHING TO END YOUR LIFE?: NO
2. HAVE YOU ACTUALLY HAD ANY THOUGHTS OF KILLING YOURSELF?: NO

## 2024-01-17 NOTE — ANESTHESIA POSTPROCEDURE EVALUATION
"Patient: Dago Han \"Arcadio\"    Procedure Summary       Date: 01/17/24 Room / Location: [facility] / [facility]    Anesthesia Start: 0831 Anesthesia Stop: 0911    Procedure: Revision Right Carpal Tunnel Release / 40 minutes (Right: Wrist) Diagnosis:       Carpal tunnel syndrome on right      (Carpal tunnel syndrome on right [G56.01])    Surgeons: Bowen Ventura MD Responsible Provider: Gilbert Mccarthy MD    Anesthesia Type: MAC ASA Status: 3            Anesthesia Type: MAC    Vitals Value Taken Time   /56 01/17/24 0925   Temp 36.4 °C (97.5 °F) 01/17/24 0925   Pulse 59 01/17/24 0925   Resp 16 01/17/24 0925   SpO2 97 % 01/17/24 0925       Anesthesia Post Evaluation    Patient location during evaluation: bedside  Patient participation: complete - patient participated  Level of consciousness: responsive to verbal stimuli  Pain score: 0  Pain management: adequate  Multimodal analgesia pain management approach  Airway patency: patent  Cardiovascular status: acceptable  Respiratory status: acceptable  Hydration status: acceptable  Postoperative Nausea and Vomiting: none  Comments: PT HEMODYNAMICALLY STABLE, NO PONV, AWAKE, ALERT AND ORIENTATED TIMES THREE        There were no known notable events for this encounter.    "

## 2024-01-17 NOTE — PERIOPERATIVE NURSING NOTE
0908 Arrives to pacu 2 with [place]. Awakens easily respirations 10 saturation 93% room air sfm 6 liters applied with good response. Sleepy but responds with brief verbal answers appropriately . Denies pain and nausea. Dressing dry and intact wit strong pulses. Nice and elevation to right arm.   0918 Resting quietly with light snore.

## 2024-01-17 NOTE — H&P
History Of Present Illness  Arcadio Han is a 83 y.o. male presenting with recurrent right carpal tunnel syndrome.     Past Medical History  Past Medical History:   Diagnosis Date    Acute bronchitis due to other specified organisms 09/05/2019    Acute bacterial bronchitis    Acute frontal sinusitis, unspecified 02/28/2018    Acute frontal sinusitis    Allergy status to unspecified drugs, medicaments and biological substances 08/19/2016    History of adverse drug reaction    Anal fistula     Anal fistula    Atherosclerotic heart disease of native coronary artery without angina pectoris 12/22/2022    Atherosclerotic heart disease of native coronary artery without angina pectoris    Bruit of right carotid artery 08/22/2023    Candidiasis of skin and nail 12/06/2017    Yeast dermatitis    Carotid artery obstruction, 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    Carotid stenosis, asymptomatic, right 08/22/2023    Cellulitis of unspecified toe 02/12/2016    Paronychia of toe    Chronic sinusitis, unspecified 09/26/2019    Sinobronchitis    Contact with and (suspected) exposure to unspecified communicable disease 06/21/2017    Exposure to communicable disease    Essential (primary) hypertension 09/12/2022    Essential hypertension    Headache, unspecified 02/27/2018    Chronic daily headache    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    Anemia due to blood loss    Lumbago with sciatica, right side 05/16/2019    Chronic right-sided low back pain with right-sided sciatica    Mixed hyperlipidemia 02/28/2022    Mixed hyperlipidemia    Occlusion and stenosis of bilateral carotid arteries     Carotid artery obstruction, bilateral    Olecranon bursitis, left elbow 12/06/2017    Olecranon bursitis of left elbow    Other abnormalities of gait and mobility 10/15/2019    Imbalance    Other allergic rhinitis 04/26/2016    Perennial allergic rhinitis    Other symptoms and signs involving general sensations and perceptions  10/05/2016    Sensation of pressure in face    Other symptoms and signs involving the musculoskeletal system 08/24/2018    Arm weakness    Pain in right ankle and joints of right foot 03/23/2018    Right ankle pain    Pain in unspecified foot 03/11/2015    Foot pain    Pain in unspecified hand 07/01/2014    Hand pain    Personal history of diseases of the skin and subcutaneous tissue 03/11/2015    History of onychia and paronychia    Personal history of other diseases of male genital organs 09/06/2016    History of acute prostatitis    Personal history of other diseases of male genital organs 03/05/2014    History of prostatitis    Personal history of other diseases of male genital organs 06/28/2016    History of benign prostatic hyperplasia    Personal history of other diseases of the musculoskeletal system and connective tissue 02/27/2018    History of muscle pain    Personal history of other diseases of the nervous system and sense organs 10/17/2013    History of diplopia    Personal history of other diseases of the nervous system and sense organs 11/14/2014    History of carpal tunnel syndrome    Personal history of other diseases of the respiratory system 09/24/2016    History of chronic sinusitis    Personal history of other diseases of the respiratory system 10/09/2014    History of chronic sinusitis    Personal history of other diseases of the respiratory system 03/30/2022    History of acute bacterial sinusitis    Personal history of other diseases of the respiratory system 08/02/2017    History of acute bacterial sinusitis    Personal history of other diseases of the respiratory system 09/19/2016    History of sinusitis    Personal history of other specified conditions 10/10/2016    History of dizziness    Personal history of other specified conditions 11/06/2014    History of vertigo    Personal history of other specified conditions 03/28/2017    History of urinary hesitancy    Polyp of cecum 01/14/2019     Rectal abscess 11/01/2017    Perirectal cellulitis    Rectal abscess 05/15/2017    Perirectal abscess    Sensorineural hearing loss, bilateral 10/10/2016    Bilateral sensorineural hearing loss    Strain of muscle, fascia and tendon of lower back, initial encounter 11/30/2021    Lumbar strain    Testicular pain, unspecified 04/09/2019    Persistent testicular pain    Unspecified acute conjunctivitis, bilateral 03/28/2017    Conjunctivitis, acute, bilateral    Unspecified otitis externa, unspecified ear 03/12/2015    Otitis externa    Unspecified symptoms and signs involving the genitourinary system 12/21/2017    UTI symptoms       Surgical History  Past Surgical History:   Procedure Laterality Date    LUMBAR LAMINECTOMY  10/17/2013    Laminectomy Lumbar    MR HEAD ANGIO WO IV CONTRAST  10/29/2013    MR HEAD ANGIO WO IV CONTRAST 10/29/2013 [facility]    MR HEAD ANGIO WO IV CONTRAST  10/18/2016    MR HEAD ANGIO WO IV CONTRAST 10/18/2016 [facility]    MR HEAD ANGIO WO IV CONTRAST  12/14/2022    MR HEAD ANGIO WO IV CONTRAST 12/14/2022 [facility]    MR NECK ANGIO WO IV CONTRAST  11/1/2019    MR NECK ANGIO WO IV CONTRAST 11/1/2019 [facility]    MR NECK ANGIO WO IV CONTRAST  6/19/2021    MR NECK ANGIO WO IV CONTRAST 6/19/2021 [facility]    MR NECK ANGIO WO IV CONTRAST  11/8/2016    MR NECK ANGIO WO IV CONTRAST 11/8/2016 [facility]    MR NECK ANGIO WO IV CONTRAST  12/14/2022    MR NECK ANGIO WO IV CONTRAST 12/14/2022 [facility]    OTHER SURGICAL HISTORY  10/17/2013    Transurethral Resection Of Prostate    OTHER SURGICAL HISTORY  02/13/2014    Cardiac Cath Procedure Outcome: Successful        Social History  He reports that he has never smoked. He has never been exposed to tobacco smoke. He has never used smokeless tobacco. He reports that he does not drink alcohol and does not use drugs.    Family History  Family History   Problem Relation Name Age of  Onset    Multiple myeloma Mother      Lung cancer Father      Multiple myeloma Brother      Other (sarcoma) Brother          Allergies  Other, Lidocaine, and Monosodium glutamate    Review of Systems   All other systems reviewed and are negative.       Physical Exam  Physical Examination Findings:  Constitutional: Appears well-developed and well-nourished.  Head: Normocephalic and atraumatic.  Eyes: Pupils are equal and round.  Cardiovascular: Intact distal pulses.   Respiratory: Effort normal. No respiratory distress.  Neurologic: Alert and oriented to person, place, and time.  Skin: Skin is warm and dry.  Hematologic / Lympahtic: No lymphedema, lymphangitis.  Psychiatric: normal mood and affect. Behavior is normal.   Musculoskeletal: Right hand examination reveals healed surgical incision from prior carpal tunnel release.  Diminished sensation median nerve distribution.  Positive provocative maneuvers for carpal tunnel syndrome.      Assessment/Plan   Principal Problem:    Carpal tunnel syndrome on right      Will proceed with revision carpal tunnel release right upper extremity as scheduled.             Bowen Ventura MD

## 2024-01-17 NOTE — ANESTHESIA PREPROCEDURE EVALUATION
"Patient: Dago Han \"Arcadio\"    Procedure Information       Date/Time: 01/17/24 0825    Procedure: Revision Right Carpal Tunnel Release / 40 minutes (Right: Wrist)    Location: [facility] / [facility]    Surgeons: Bowen Ventura MD          Past Medical History:   Diagnosis Date    Acute bronchitis due to other specified organisms 09/05/2019    Acute bacterial bronchitis    Acute frontal sinusitis, unspecified 02/28/2018    Acute frontal sinusitis    Allergy status to unspecified drugs, medicaments and biological substances 08/19/2016    History of adverse drug reaction    Anal fistula     Anal fistula    Atherosclerotic heart disease of native coronary artery without angina pectoris 12/22/2022    Atherosclerotic heart disease of native coronary artery without angina pectoris    Bruit of right carotid artery 08/22/2023    Candidiasis of skin and nail 12/06/2017    Yeast dermatitis    Carotid artery obstruction, 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    Carotid stenosis, asymptomatic, right 08/22/2023    Cellulitis of unspecified toe 02/12/2016    Paronychia of toe    Chronic sinusitis, unspecified 09/26/2019    Sinobronchitis    Contact with and (suspected) exposure to unspecified communicable disease 06/21/2017    Exposure to communicable disease    Essential (primary) hypertension 09/12/2022    Essential hypertension    Headache, unspecified 02/27/2018    Chronic daily headache    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    Anemia due to blood loss    Lumbago with sciatica, right side 05/16/2019    Chronic right-sided low back pain with right-sided sciatica    Mixed hyperlipidemia 02/28/2022    Mixed hyperlipidemia    Occlusion and stenosis of bilateral carotid arteries     Carotid artery obstruction, bilateral    Olecranon bursitis, left elbow 12/06/2017    Olecranon bursitis of left elbow    Other abnormalities of gait and mobility 10/15/2019    Imbalance    Other allergic rhinitis 04/26/2016 " Medications:    Perennial allergic rhinitis    Other symptoms and signs involving general sensations and perceptions 10/05/2016    Sensation of pressure in face    Other symptoms and signs involving the musculoskeletal system 08/24/2018    Arm weakness    Pain in right ankle and joints of right foot 03/23/2018    Right ankle pain    Pain in unspecified foot 03/11/2015    Foot pain    Pain in unspecified hand 07/01/2014    Hand pain    Personal history of diseases of the skin and subcutaneous tissue 03/11/2015    History of onychia and paronychia    Personal history of other diseases of male genital organs 09/06/2016    History of acute prostatitis    Personal history of other diseases of male genital organs 03/05/2014    History of prostatitis    Personal history of other diseases of male genital organs 06/28/2016    History of benign prostatic hyperplasia    Personal history of other diseases of the musculoskeletal system and connective tissue 02/27/2018    History of muscle pain    Personal history of other diseases of the nervous system and sense organs 10/17/2013    History of diplopia    Personal history of other diseases of the nervous system and sense organs 11/14/2014    History of carpal tunnel syndrome    Personal history of other diseases of the respiratory system 09/24/2016    History of chronic sinusitis    Personal history of other diseases of the respiratory system 10/09/2014    History of chronic sinusitis    Personal history of other diseases of the respiratory system 03/30/2022    History of acute bacterial sinusitis    Personal history of other diseases of the respiratory system 08/02/2017    History of acute bacterial sinusitis    Personal history of other diseases of the respiratory system 09/19/2016    History of sinusitis    Personal history of other specified conditions 10/10/2016    History of dizziness    Personal history of other specified conditions 11/06/2014    History of vertigo    Personal history  of other specified conditions 03/28/2017    History of urinary hesitancy    Polyp of cecum 01/14/2019    Rectal abscess 11/01/2017    Perirectal cellulitis    Rectal abscess 05/15/2017    Perirectal abscess    Sensorineural hearing loss, bilateral 10/10/2016    Bilateral sensorineural hearing loss    Strain of muscle, fascia and tendon of lower back, initial encounter 11/30/2021    Lumbar strain    Testicular pain, unspecified 04/09/2019    Persistent testicular pain    Unspecified acute conjunctivitis, bilateral 03/28/2017    Conjunctivitis, acute, bilateral    Unspecified otitis externa, unspecified ear 03/12/2015    Otitis externa    Unspecified symptoms and signs involving the genitourinary system 12/21/2017    UTI symptoms     Past Surgical History:   Procedure Laterality Date    LUMBAR LAMINECTOMY  10/17/2013    Laminectomy Lumbar    MR HEAD ANGIO WO IV CONTRAST  10/29/2013    MR HEAD ANGIO WO IV CONTRAST 10/29/2013 [facility]    MR HEAD ANGIO WO IV CONTRAST  10/18/2016    MR HEAD ANGIO WO IV CONTRAST 10/18/2016 [facility]    MR HEAD ANGIO WO IV CONTRAST  12/14/2022    MR HEAD ANGIO WO IV CONTRAST 12/14/2022 [facility]    MR NECK ANGIO WO IV CONTRAST  11/1/2019    MR NECK ANGIO WO IV CONTRAST 11/1/2019 [facility]    MR NECK ANGIO WO IV CONTRAST  6/19/2021    MR NECK ANGIO WO IV CONTRAST 6/19/2021 [facility]    MR NECK ANGIO WO IV CONTRAST  11/8/2016    MR NECK ANGIO WO IV CONTRAST 11/8/2016 [facility]    MR NECK ANGIO WO IV CONTRAST  12/14/2022    MR NECK ANGIO WO IV CONTRAST 12/14/2022 [facility]    OTHER SURGICAL HISTORY  10/17/2013    Transurethral Resection Of Prostate    OTHER SURGICAL HISTORY  02/13/2014    Cardiac Cath Procedure Outcome: Successful         Relevant Problems   Anesthesia (within normal limits)      Cardiovascular   (+) Coronary artery disease involving native coronary artery of native heart without angina pectoris    (+) Essential hypertension   (+) Mixed hyperlipidemia   (+) Renovascular hypertension      Endocrine   (+) Multiple thyroid nodules      GI   (+) Gastroesophageal reflux disease without esophagitis      /Renal   (+) Stage 3 chronic kidney disease (CMS/HCC)      Neuro/Psych   (+) Carpal tunnel syndrome of right wrist   (+) Carpal tunnel syndrome on right   (+) Stroke (CMS/HCC)      Musculoskeletal   (+) Carpal tunnel syndrome of right wrist   (+) Carpal tunnel syndrome on right   (+) Spinal stenosis of cervical region with radiculopathy       Clinical information reviewed:   Tobacco  Allergies  Meds   Med Hx  Surg Hx   Fam Hx  Soc Hx        NPO Detail:  NPO/Void Status  Date of Last Liquid: 01/16/24  Time of Last Liquid: 2100  Date of Last Solid: 01/16/24  Time of Last Solid: 2100  Last Intake Type: Light meal  Time of Last Void: 0700         Physical Exam    Airway  Mallampati: I  TM distance: >3 FB  Neck ROM: full     Cardiovascular - normal exam     Dental - normal exam     Pulmonary - normal exam     Abdominal            Anesthesia Plan    History of general anesthesia?: yes  History of complications of general anesthesia?: no    ASA 3     MAC   (TIVA)  The patient is not a current smoker.    intravenous induction   Anesthetic plan and risks discussed with patient.    Plan discussed with CRNA and CAA.

## 2024-01-17 NOTE — OP NOTE
"Revision Right Carpal Tunnel Release / 40 minutes (R) Operative Note     Date: 2024  OR Location: [facility]    Name: Dago Han \"Arcadio\", : 1940, Age: 83 y.o., MRN: [id number], Sex: male    Diagnosis  Pre-op Diagnosis     * Carpal tunnel syndrome on right [G56.01] Post-op Diagnosis     * Carpal tunnel syndrome on right [G56.01]     Procedures  Revision Right Carpal Tunnel Release / 40 minutes  97649 - DE NEUROPLASTY &/TRANSPOS MEDIAN NRV CARPAL TUNNE      Surgeons      * Bowen Ventura - Primary    Resident/Fellow/Other Assistant:  Surgeon(s) and Role:    Procedure Summary  Anesthesia: Monitor Anesthesia Care  ASA: III  Anesthesia Staff: Anesthesiologist: Gilbert Mccarthy MD  CRNA: AURA Rivera-CRNA  Estimated Blood Loss: 1 mL  Intra-op Medications:   Medication Name Total Dose   BUPivacaine HCl (Marcaine) 0.5 % (5 mg/mL) injection 10 mL   lactated Ringer's infusion 81.67 mL   ceFAZolin in dextrose (iso-os) (Ancef) IVPB 2 g 2 g              Anesthesia Record               Intraprocedure I/O Totals       None           Specimen:   ID Type Source Tests Collected by Time   1 : Tenosynovium stained with Congo red Tissue SOFT TISSUE RESECTION SURGICAL PATHOLOGY EXAM Sveta Ortiz RN [id number]        Staff:   Circulator: Sveta Ortiz RN  Scrub Person: Thea Golden PA-C; Kimberly Vega         Drains and/or Catheters: * None in log *    Tourniquet Times:   * Missing tourniquet times found for documented tourniquets in lo *     Implants:     Findings: Recurrent carpal tunnel syndrome with dense fascial contracture around the median nerve in the distal forearm, flexor tenosynovitis    Indications: Arcadio Han is an 83 y.o. male who is having surgery for Carpal tunnel syndrome on right [G56.01].       The patient was seen in the preoperative area. The risks, benefits, complications, treatment options, non-operative alternatives, expected recovery and outcomes were discussed with the " patient. The possibilities of reaction to medication, pulmonary aspiration, injury to surrounding structures, bleeding, recurrent infection, the need for additional procedures, failure to diagnose a condition, and creating a complication requiring transfusion or operation were discussed with the patient. The patient concurred with the proposed plan, giving informed consent.  The site of surgery was properly noted/marked if necessary per policy. The patient has been actively warmed in preoperative area. Preoperative antibiotics have been ordered and given within 1 hours of incision. Venous thrombosis prophylaxis have been ordered including bilateral sequential compression devices    Procedure Details:   83-year-old right-hand-dominant gentleman with symptoms of carpal tunnel syndrome.  He had a remote carpal tunnel release by another surgeon and reports that he never really had complete symptom resolution following the surgery.  Neuromuscular ultrasound was performed recently which showed a very prominent notch sign suggestive of recurrence median nerve compression.  He presents today for revision carpal tunnel release.  Preoperatively the right hand was identified and marked for surgery.  Informed consent process was completed.    Patient was brought to the operating and placed supine on the operating table.  Timeout procedure performed to verify correct patient procedure and operative site.  Intravenous antibiotics were administered.  After proper level of IV sedation been achieved half percent Marcaine was infiltrated throughout the surgical field.  The right upper extremity was then prepped and draped in usual sterile fashion.  Limb was exsanguinated and the tourniquet was inflated to 250 mmHg.    Using the prior surgical incision in the palm we created a new incision which incorporated the original 1.  Incision now was extended obliquely across the wrist flexion crease and proximally on the ulnar border of the  flexor tendons.  Patient did not have a palmaris longus tendon but the incision would have been just ulnar to where the palmaris longus tendon was.  There was dense scar through the surgical field in the proximal palm.  Proximally we identified the antebrachial fascia which had a pristine appearance to it.  The fascia was identified and then incised revealing the underlying FDS tendons.  The median nerve was exposed.  Proximally we released the fascia into the mid forearm to a level where the nerve was deep to the FDS muscle belly.  We then very carefully traced the carpal tunnel contents distally into the palm with sharp release of the scar in the subcutaneous tissue and division of the reformed transverse carpal ligament.  This was carried all the way out into the mid palm.  Complete release of the transverse carpal ligament was confirmed.  The sentinel fat was exposed.  We identified all of the divisions and branches of the median nerve including the recurrent motor branch.  There were dense adhesions between the median nerve and the deep surface of the radial leaflet of the transverse carpal ligament which were sharply released.  We then retracted the median nerve radially to inspect the flexor tendons.  There was encasing tenosynovium around the FDS and FDP tendons.  We performed a tenosynovectomy to remove the bulk of these tendons to prevent this from being coming another compressive structure.  Tenosynovium was placed into formalin and sent to pathology with instructions to stain for Congo red to look for amyloidosis.  The median nerve was now completely decompressed.  Excellent tendon excursion was demonstrated.  The wound was copiously irrigated and then closed interrupted fashion.  A sterile bandage was applied.  The tourniquet was deflated.  The patient was transferred to the recovery in stable condition.    Postoperatively he will be discharged home once comfortable.  He can remove his bandage on  postoperative day #4 and begin wound care with light activities as instructed.  He will return to clinic in 2 weeks for wound check and advancement of his activities.    Increased procedural services were necessary because of the revision nature of this procedure.        Complications:  None; patient tolerated the procedure well.    Disposition: PACU - hemodynamically stable.  Condition: stable         Additional Details:      Attending Attestation: I was present and scrubbed for the entire procedure.    Bowen Ventura  Phone Number: [phone]

## 2024-01-22 ENCOUNTER — OFFICE VISIT (OUTPATIENT)
Dept: CARDIOLOGY | Facility: CLINIC | Age: 84
End: 2024-01-22
Payer: MEDICARE

## 2024-01-22 VITALS
HEIGHT: 70 IN | HEART RATE: 71 BPM | SYSTOLIC BLOOD PRESSURE: 152 MMHG | BODY MASS INDEX: 27.5 KG/M2 | DIASTOLIC BLOOD PRESSURE: 71 MMHG | WEIGHT: 192.1 LBS

## 2024-01-22 DIAGNOSIS — I63.9 CEREBROVASCULAR ACCIDENT (CVA), UNSPECIFIED MECHANISM (MULTI): ICD-10-CM

## 2024-01-22 DIAGNOSIS — I10 ESSENTIAL HYPERTENSION: ICD-10-CM

## 2024-01-22 DIAGNOSIS — I50.32 CHRONIC DIASTOLIC HEART FAILURE (MULTI): Primary | ICD-10-CM

## 2024-01-22 DIAGNOSIS — I25.10 CORONARY ARTERY DISEASE INVOLVING NATIVE CORONARY ARTERY OF NATIVE HEART WITHOUT ANGINA PECTORIS: ICD-10-CM

## 2024-01-22 DIAGNOSIS — E78.2 MIXED HYPERLIPIDEMIA: ICD-10-CM

## 2024-01-22 PROCEDURE — 3078F DIAST BP <80 MM HG: CPT | Performed by: NURSE PRACTITIONER

## 2024-01-22 PROCEDURE — 3077F SYST BP >= 140 MM HG: CPT | Performed by: NURSE PRACTITIONER

## 2024-01-22 PROCEDURE — 1159F MED LIST DOCD IN RCRD: CPT | Performed by: NURSE PRACTITIONER

## 2024-01-22 PROCEDURE — 99213 OFFICE O/P EST LOW 20 MIN: CPT | Performed by: NURSE PRACTITIONER

## 2024-01-22 PROCEDURE — 1160F RVW MEDS BY RX/DR IN RCRD: CPT | Performed by: NURSE PRACTITIONER

## 2024-01-22 PROCEDURE — 1036F TOBACCO NON-USER: CPT | Performed by: NURSE PRACTITIONER

## 2024-01-22 PROCEDURE — 1126F AMNT PAIN NOTED NONE PRSNT: CPT | Performed by: NURSE PRACTITIONER

## 2024-01-22 NOTE — PROGRESS NOTES
"Chief Complaint:   CHF     History Of Present Illness:    Arcadio Han is a 83 y.o. male here for follow-up of heart failure with preserved ejection fraction.   The patient is compliant with regular exercise, daily weights, and follows a low sodium diet. The patient has been well since their last office appointment and is not having any anginal symptoms, dyspnea on exertion, edema, or weight gain.  The patient has not been hospitalized for heart failure.  The patient is presently NYHA functional class I  and is euvolemic on loop diuretics.    Weights have been stable at home.     Working out via cycling without complaint  Walking without complaint. Was becoming SOB walking when had PNA.   No longer dizzy when going from sitting to standing.   He is doing very well    Right CEA 1/30/2023 post multiple right hemispheric cerebral infarcts.  SEH (Normal EF, Large LAD ischemia.) - 11/13/2019  Cath (75% mid-LCX, mild LAD) - 11/15/2019  Cath (Severe diag, Moderate LAD, Severe right renal) - 1/31/2014     Allergies:  Other, Lidocaine, and Monosodium glutamate    Review of Systems   All other systems reviewed and are negative.    Visit Vitals  /71 (BP Location: Left arm, Patient Position: Sitting, BP Cuff Size: Adult)   Pulse 71   Ht 1.778 m (5' 10\")   Wt 87.1 kg (192 lb 1.6 oz)   BMI 27.56 kg/m²   Smoking Status Never   BSA 2.07 m²         Objective   Vitals reviewed.   Constitutional:       Appearance: Healthy appearance. Not in distress.   Neck:      Vascular: No JVR. JVD normal.   Pulmonary:      Effort: Pulmonary effort is normal.      Breath sounds: Normal breath sounds. No wheezing. No rhonchi. No rales.   Chest:      Chest wall: Not tender to palpatation.   Cardiovascular:      PMI at left midclavicular line. Normal rate. Regular rhythm. Normal S1. Normal S2.       Murmurs: There is no murmur.      No gallop.  No click. No rub.   Edema:     Peripheral edema absent.   Abdominal:      General: Bowel sounds are " normal.      Palpations: Abdomen is soft.      Tenderness: There is no abdominal tenderness.   Musculoskeletal:         General: No tenderness. Skin:     General: Skin is warm and dry.   Neurological:      General: No focal deficit present.      Mental Status: Alert and oriented to person, place and time.       Assessment/Plan   Diagnoses and all orders for this visit:  Chronic diastolic heart failure (CMS/HCC)  -Was trialed on Farxiga by nephrology; it potentated the effects of his testosterone blocker.   - Asked to stop bumex at last visit. He reports he is taking bumex every other day. Recommend stopping it with close monitoring of weights. Does note some intermittent dizziness.   - BP too soft for entresto and beck.   - suspect PNA is cause   Coronary artery disease involving native coronary artery of native heart without angina pectoris  - Cath negative for significant dx  - continue statin/asa  Mixed hyperlipidemia  - continue statin  Essential hypertension  - home bp 120-130s  - no changes   Cerebrovascular accident (CVA), unspecified mechanism (CMS/HCC)  - on plavix/asa         Current Outpatient Medications:     amoxicillin-pot clavulanate (Augmentin) 875-125 mg tablet, Take 1 tablet (875 mg) by mouth 2 times a day., Disp: 10 tablet, Rfl: 0    aspirin 81 mg capsule, Take 81 mg by mouth once daily., Disp: , Rfl:     azelastine (Astelin) 137 mcg (0.1 %) nasal spray, Administer 2 sprays into affected nostril(s) twice a day., Disp: , Rfl:     bumetanide (Bumex) 1 mg tablet, Take 1 tablet (1 mg) by mouth if needed (edema, 3lb weight gain in 24 hours)., Disp: , Rfl:     butalbital-acetaminophen-caff -40 mg tablet, Take one PO every 4-6 hours as needed.  Not to exceed 6 tabs per 24 hours, Disp: 120 tablet, Rfl: 0    calcium 500 mg calcium (1,250 mg) tablet, Take 1 tablet (1,250 mg) by mouth 2 times a day with meals., Disp: , Rfl:     carboxymethylcellulose (THERATears) 0.25 % ophthalmic solution, if needed  for dry eyes., Disp: , Rfl:     cholecalciferol (Vitamin D-3) 25 MCG (1000 UT) capsule, Take 1 capsule (25 mcg) by mouth once daily., Disp: , Rfl:     clopidogrel (Plavix) 75 mg tablet, Take 1 tablet (75 mg) by mouth once daily., Disp: , Rfl:     ferrous fumarate-vitamin C (Trev-Sequeles 65-25), Take 1 tablet by mouth 3 times a day with meals. Do not crush, chew, or split., Disp: , Rfl:     gabapentin (Neurontin) 100 mg capsule, Take 2 capsules (200 mg) by mouth 3 times a day., Disp: 180 capsule, Rfl: 3    HYDROcodone-acetaminophen (Norco) 5-325 mg tablet, Take 1 tablet by mouth every 6 hours if needed for severe pain (7 - 10) for up to 12 doses., Disp: 12 tablet, Rfl: 0    hydrocortisone (Anusol-HC) 2.5 % rectal cream, APPLY RECTALLY TWICE DAILY FOR 7 DAYS THEN AS NEEDED, Disp: , Rfl:     icosapent ethyL (Vascepa) 1 gram capsule, Take 2 capsules (2 g) by mouth 2 times a day with meals. swallowing whole. Do not chew, open, dissolve and/or crush, Disp: , Rfl:     leuprolide, 6-month, (Eligard) 45 mg injection, , Disp: , Rfl:     losartan (Cozaar) 25 mg tablet, Take 1 tablet (25 mg) by mouth once daily., Disp: , Rfl:     multivitamin tablet, PRN, Disp: , Rfl:     omega-3/dha/epa/fish oil (OMEGA-3 FISH OIL ORAL), Take by mouth once daily. 300 mg, Disp: , Rfl:     pantoprazole (ProtoNix) 20 mg EC tablet, Take 1 tablet (20 mg) by mouth once daily., Disp: , Rfl:     pitavastatin calcium (Livalo) 2 mg tablet, Take 1 tablet by mouth once daily., Disp: , Rfl:     tamsulosin (Flomax) 0.4 mg 24 hr capsule, Take 1 capsule (0.4 mg) by mouth once daily., Disp: , Rfl:     Exclusive of any other services or procedures performed, Angeles FUCHS, spent 30 minutes in duration for this visit today.  This time consisted of chart review, obtaining history, and/or performing the exam as documented above, as well as, documenting the clinical information for the encounter in the electronic record, discussing treatment options,  plans, and/or goals with patient, family, and/or caregiver, refilling medications, updating the electronic record, ordering medicines, lab work, imaging, referrals, and/or procedures as documented above and communicating with other [facility] professionals. I have discussed the results of laboratory, radiology, and cardiology studies with the patient and their family/caregiver.

## 2024-01-24 ENCOUNTER — APPOINTMENT (OUTPATIENT)
Dept: HEMATOLOGY/ONCOLOGY | Facility: CLINIC | Age: 84
End: 2024-01-24
Payer: MEDICARE

## 2024-01-25 LAB
LABORATORY COMMENT REPORT: NORMAL
PATH REPORT.FINAL DX SPEC: NORMAL
PATH REPORT.GROSS SPEC: NORMAL
PATH REPORT.RELEVANT HX SPEC: NORMAL
PATH REPORT.TOTAL CANCER: NORMAL

## 2024-01-30 DIAGNOSIS — E78.2 MIXED HYPERLIPIDEMIA: Primary | ICD-10-CM

## 2024-01-30 RX ORDER — ICOSAPENT ETHYL 1 G/1
CAPSULE ORAL
Qty: 360 CAPSULE | Refills: 3 | Status: SHIPPED | OUTPATIENT
Start: 2024-01-30

## 2024-01-30 RX ORDER — PITAVASTATIN CALCIUM 2.09 MG/1
1 TABLET, FILM COATED ORAL DAILY
Qty: 90 TABLET | Refills: 2 | Status: SHIPPED | OUTPATIENT
Start: 2024-01-30

## 2024-02-01 ENCOUNTER — OFFICE VISIT (OUTPATIENT)
Dept: ORTHOPEDIC SURGERY | Facility: CLINIC | Age: 84
End: 2024-02-01
Payer: MEDICARE

## 2024-02-01 VITALS — WEIGHT: 192 LBS | BODY MASS INDEX: 27.49 KG/M2 | HEIGHT: 70 IN

## 2024-02-01 DIAGNOSIS — G56.01 CARPAL TUNNEL SYNDROME ON RIGHT: Primary | ICD-10-CM

## 2024-02-01 PROCEDURE — 1159F MED LIST DOCD IN RCRD: CPT | Performed by: ORTHOPAEDIC SURGERY

## 2024-02-01 PROCEDURE — 1036F TOBACCO NON-USER: CPT | Performed by: ORTHOPAEDIC SURGERY

## 2024-02-01 PROCEDURE — 1123F ACP DISCUSS/DSCN MKR DOCD: CPT | Performed by: ORTHOPAEDIC SURGERY

## 2024-02-01 PROCEDURE — 1126F AMNT PAIN NOTED NONE PRSNT: CPT | Performed by: ORTHOPAEDIC SURGERY

## 2024-02-01 PROCEDURE — 99024 POSTOP FOLLOW-UP VISIT: CPT | Performed by: ORTHOPAEDIC SURGERY

## 2024-02-01 PROCEDURE — 1160F RVW MEDS BY RX/DR IN RCRD: CPT | Performed by: ORTHOPAEDIC SURGERY

## 2024-02-01 ASSESSMENT — PAIN - FUNCTIONAL ASSESSMENT: PAIN_FUNCTIONAL_ASSESSMENT: NO/DENIES PAIN

## 2024-02-01 NOTE — PROGRESS NOTES
First postoperative visit revision right carpal tunnel release.  Date of surgery was January 17.  Pain is well-controlled.  He never took any medications after surgery.  He believes that his sensation in the median nerve distribution has improved compared to prior to surgery.  Overall he is very happy.    Examination reveals well-healed surgical incision.  Minimal palmar swelling but mild digital swelling limiting composite digital flexion.  Sensation intact to light touch.    Impression: Recurrent carpal tunnel syndrome right hand.    Plan: We have discussed skin care and desensitization techniques.  Encouraged him to work on finger range of motion to improve his digital edema.  He can progressively return to activities but padded gloves have been recommended as he returns to the gym.  He will return to see me in 2 months for repeat clinical examination.    Bowen Ventura MD    [facility] [employer]  Department of Orthopaedic Surgery  Chief of Hand and Upper Extremity Surgery  [facility]    Dictation performed with the use of voice recognition software. Syntax and grammatical errors may exist.

## 2024-02-22 ENCOUNTER — TELEPHONE (OUTPATIENT)
Dept: HEMATOLOGY/ONCOLOGY | Facility: CLINIC | Age: 84
End: 2024-02-22
Payer: MEDICARE

## 2024-03-19 ENCOUNTER — APPOINTMENT (OUTPATIENT)
Dept: HEMATOLOGY/ONCOLOGY | Facility: CLINIC | Age: 84
End: 2024-03-19
Payer: MEDICARE

## 2024-03-19 ENCOUNTER — LAB (OUTPATIENT)
Dept: LAB | Facility: LAB | Age: 84
End: 2024-03-19
Payer: MEDICARE

## 2024-03-19 DIAGNOSIS — C61 ADENOCARCINOMA OF PROSTATE (MULTI): ICD-10-CM

## 2024-03-19 LAB
ALBUMIN SERPL BCP-MCNC: 4.1 G/DL (ref 3.4–5)
ALP SERPL-CCNC: 106 U/L (ref 33–136)
ALT SERPL W P-5'-P-CCNC: 11 U/L (ref 10–52)
ANION GAP SERPL CALC-SCNC: 11 MMOL/L (ref 10–20)
AST SERPL W P-5'-P-CCNC: 13 U/L (ref 9–39)
BASOPHILS # BLD AUTO: 0.03 X10*3/UL (ref 0–0.1)
BASOPHILS NFR BLD AUTO: 0.6 %
BILIRUB SERPL-MCNC: 0.3 MG/DL (ref 0–1.2)
BUN SERPL-MCNC: 27 MG/DL (ref 6–23)
CALCIUM SERPL-MCNC: 8.8 MG/DL (ref 8.6–10.3)
CHLORIDE SERPL-SCNC: 107 MMOL/L (ref 98–107)
CO2 SERPL-SCNC: 27 MMOL/L (ref 21–32)
CREAT SERPL-MCNC: 1.55 MG/DL (ref 0.5–1.3)
EGFRCR SERPLBLD CKD-EPI 2021: 44 ML/MIN/1.73M*2
EOSINOPHIL # BLD AUTO: 0.2 X10*3/UL (ref 0–0.4)
EOSINOPHIL NFR BLD AUTO: 3.7 %
ERYTHROCYTE [DISTWIDTH] IN BLOOD BY AUTOMATED COUNT: 13.4 % (ref 11.5–14.5)
GLUCOSE SERPL-MCNC: 103 MG/DL (ref 74–99)
HCT VFR BLD AUTO: 32.9 % (ref 41–52)
HGB BLD-MCNC: 11 G/DL (ref 13.5–17.5)
IMM GRANULOCYTES # BLD AUTO: 0.01 X10*3/UL (ref 0–0.5)
IMM GRANULOCYTES NFR BLD AUTO: 0.2 % (ref 0–0.9)
LYMPHOCYTES # BLD AUTO: 1.21 X10*3/UL (ref 0.8–3)
LYMPHOCYTES NFR BLD AUTO: 22.3 %
MCH RBC QN AUTO: 30.7 PG (ref 26–34)
MCHC RBC AUTO-ENTMCNC: 33.4 G/DL (ref 32–36)
MCV RBC AUTO: 92 FL (ref 80–100)
MONOCYTES # BLD AUTO: 0.59 X10*3/UL (ref 0.05–0.8)
MONOCYTES NFR BLD AUTO: 10.9 %
NEUTROPHILS # BLD AUTO: 3.39 X10*3/UL (ref 1.6–5.5)
NEUTROPHILS NFR BLD AUTO: 62.3 %
NRBC BLD-RTO: 0 /100 WBCS (ref 0–0)
PLATELET # BLD AUTO: 157 X10*3/UL (ref 150–450)
POTASSIUM SERPL-SCNC: 4.5 MMOL/L (ref 3.5–5.3)
PROT SERPL-MCNC: 6.6 G/DL (ref 6.4–8.2)
PSA SERPL-MCNC: 0.02 NG/ML
RBC # BLD AUTO: 3.58 X10*6/UL (ref 4.5–5.9)
SODIUM SERPL-SCNC: 140 MMOL/L (ref 136–145)
WBC # BLD AUTO: 5.4 X10*3/UL (ref 4.4–11.3)

## 2024-03-19 PROCEDURE — 80053 COMPREHEN METABOLIC PANEL: CPT

## 2024-03-19 PROCEDURE — 85025 COMPLETE CBC W/AUTO DIFF WBC: CPT

## 2024-03-19 PROCEDURE — 84403 ASSAY OF TOTAL TESTOSTERONE: CPT

## 2024-03-19 PROCEDURE — 84153 ASSAY OF PSA TOTAL: CPT

## 2024-03-19 PROCEDURE — 36415 COLL VENOUS BLD VENIPUNCTURE: CPT

## 2024-03-20 LAB — TESTOST SERPL-MCNC: <30 NG/DL (ref 240–1000)

## 2024-03-25 NOTE — PROGRESS NOTES
Consent:  Verbal consent was requested and obtained from patient on this date for a telehealth visit.    Patient ID: Arcadio Han is a 83 y.o. male.  Cancer History:   Treatment Synopsis:    Referring Provider  Rico Ahuja - neurologist  Cardiology - Alejandra Craig  ENT - Idania Garcia / nephrology / [facility]      Prostate Cancer - High Risk , Local Prostate  Treatment  -elevated PSA 11.18, MRI 2/2022 - 2.7x2.5 right prostate, EPE, SV invasion, no LAD, 3/2022 - BS - negative, Prostate biopsy 3/22 - multiple cores of Gr Gr 4 , PNI, refused PET PSMA  -ADT / Eligard 3m 4/19/22  - SBRT to prostate and seminal vesicles, treatment Dates: 05/27/2022-06/08/2022  Radiation Dose Prescribed: 3750 cGy in 5 fractions, 750 cGy per fraction  -last ADT 10/3/23, eligard 3m , refused Ellyn/pred     Other history  CAD, trigger finger, obesity, HTN, HL, CKD, headaches.migraines, carotid stenosis, GERD, CEA 1/23, Stroke /CVA 12/22, Anemia elevated SFLC       Subjective    HPI  Seen in follow up for his prostate cancer. He is being monitored.     Appetite is good. Watching his weight stable.   Energy is stable. Tried to keep busy. Some weight lifting and desk cycle.   Denies cough/sob.  Denies n/v.   Bowels: since iron sometimes goes every 2-3 days. Regular now.   Denies dysuria/hematuria.   Denies pain that is new or unusual. Does get migraines.   +hot flashes. Gabapentin helps. Takes two tid. Sometimes takes 3 at a time.   Notes toe fungus. Seeing derm.         Objective    BSA: There is no height or weight on file to calculate BSA.  There were no vitals taken for this visit.     Physical Exam  A&O in no acute distress    Performance Status:  Symptomatic; fully ambulatory    Lab Results   Component Value Date    PSA 0.02 03/19/2024    PSA 0.01 12/07/2023    PSA <0.10 10/03/2023     Lab Results   Component Value Date    WBC 5.4 03/19/2024    HGB 11.0 (L) 03/19/2024     HCT 32.9 (L) 03/19/2024    MCV 92 03/19/2024     03/19/2024     Lab Results   Component Value Date    GLUCOSE 103 (H) 03/19/2024    CALCIUM 8.8 03/19/2024     03/19/2024    K 4.5 03/19/2024    CO2 27 03/19/2024     03/19/2024    BUN 27 (H) 03/19/2024    CREATININE 1.55 (H) 03/19/2024     Lab Results   Component Value Date    TESTOSTERONE <30 (L) 03/19/2024     Lab Results   Component Value Date    ALT 11 03/19/2024    AST 13 03/19/2024    ALKPHOS 106 03/19/2024    BILITOT 0.3 03/19/2024      Assessment/Plan    Being monitored after completing ADT in October.   PSA 0.02, was 0.01 in December. T still castrate.   Patient feeling well other than continued ,bothersome hot flashes.   Gabapentin helps hot flashes. He has 100 mg capsules and usually takes 2 tid but sometimes takes 3 tid.   Suggested we try 300 mg capsules and he would just need to take one pill tid. Script sent. He will let us know if he has an issue with this dose.     Follow up in 3 mos with labs prior.     Commended him on exercising.     Our video visit lasted 13 minutes.        Diagnoses and all orders for this visit:  Adenocarcinoma of prostate (CMS/HCC)  -     Clinic Appointment Request Virtual Est; [facility] (or [place])  -     gabapentin (Neurontin) 300 mg capsule; Take 1 capsule (300 mg) by mouth 3 times a day.  -     Clinic Appointment Request [facility]; [facility] [id number]; Future  -     CBC and Auto Differential; Future  -     Comprehensive Metabolic Panel; Future  -     Prostate Specific Antigen; Future  -     Testosterone; Future           Berta Burris, AURA-CNP

## 2024-03-26 ENCOUNTER — TELEMEDICINE (OUTPATIENT)
Dept: HEMATOLOGY/ONCOLOGY | Facility: CLINIC | Age: 84
End: 2024-03-26
Payer: MEDICARE

## 2024-03-26 ENCOUNTER — APPOINTMENT (OUTPATIENT)
Dept: HEMATOLOGY/ONCOLOGY | Facility: CLINIC | Age: 84
End: 2024-03-26
Payer: MEDICARE

## 2024-03-26 DIAGNOSIS — C61 ADENOCARCINOMA OF PROSTATE (MULTI): ICD-10-CM

## 2024-03-26 PROCEDURE — 1160F RVW MEDS BY RX/DR IN RCRD: CPT | Performed by: NURSE PRACTITIONER

## 2024-03-26 PROCEDURE — 1159F MED LIST DOCD IN RCRD: CPT | Performed by: NURSE PRACTITIONER

## 2024-03-26 PROCEDURE — 1036F TOBACCO NON-USER: CPT | Performed by: NURSE PRACTITIONER

## 2024-03-26 PROCEDURE — 1123F ACP DISCUSS/DSCN MKR DOCD: CPT | Performed by: NURSE PRACTITIONER

## 2024-03-26 PROCEDURE — 99212 OFFICE O/P EST SF 10 MIN: CPT | Performed by: NURSE PRACTITIONER

## 2024-03-26 RX ORDER — GABAPENTIN 300 MG/1
300 CAPSULE ORAL 3 TIMES DAILY
Qty: 90 CAPSULE | Refills: 1 | Status: SHIPPED | OUTPATIENT
Start: 2024-03-26 | End: 2024-05-22 | Stop reason: SDUPTHER

## 2024-04-02 ENCOUNTER — OFFICE VISIT (OUTPATIENT)
Dept: ORTHOPEDIC SURGERY | Facility: CLINIC | Age: 84
End: 2024-04-02
Payer: MEDICARE

## 2024-04-02 VITALS — WEIGHT: 192 LBS | BODY MASS INDEX: 27.49 KG/M2 | HEIGHT: 70 IN

## 2024-04-02 DIAGNOSIS — G56.01 CARPAL TUNNEL SYNDROME ON RIGHT: Primary | ICD-10-CM

## 2024-04-02 PROCEDURE — 1123F ACP DISCUSS/DSCN MKR DOCD: CPT | Performed by: ORTHOPAEDIC SURGERY

## 2024-04-02 PROCEDURE — 1159F MED LIST DOCD IN RCRD: CPT | Performed by: ORTHOPAEDIC SURGERY

## 2024-04-02 PROCEDURE — 1160F RVW MEDS BY RX/DR IN RCRD: CPT | Performed by: ORTHOPAEDIC SURGERY

## 2024-04-02 PROCEDURE — 1036F TOBACCO NON-USER: CPT | Performed by: ORTHOPAEDIC SURGERY

## 2024-04-02 PROCEDURE — 99024 POSTOP FOLLOW-UP VISIT: CPT | Performed by: ORTHOPAEDIC SURGERY

## 2024-04-02 ASSESSMENT — PAIN - FUNCTIONAL ASSESSMENT: PAIN_FUNCTIONAL_ASSESSMENT: NO/DENIES PAIN

## 2024-04-02 NOTE — PROGRESS NOTES
[facility]  Hand and Upper Extremity Service  Follow up visit         Follow up for: Right wrist     Interval History: Revision of right carpal tunnel release on 1/17/24. Pain was well controlled on last visit. Overall doing great. Edema is improving but can still feel numbness in fingers when buttoning his shirt. Noticed that hand paresthesia has improved while driving. Doesn't report sleep disturbances attributed to pain or numbness.               Past medical history, medications, allergies, surgical history and review of systems are reviewed and otherwise unchanged when compared to last visit on 2/1/24.         Examination:  Constitutional: Oriented to person, place, and time.  Appears well-developed and well-nourished.  Head: Normocephalic and atraumatic.  Eyes: Pupils are equal, round, and reactive to light.  Cardiovascular: Intact distal pulses.  Pulmonary/Chest/Breast: Effort normal. No respiratory distress.  Neurological: Alert and oriented to person, place, and time.  Skin: Skin is warm and dry.  Psychiatric: normal mood and affect.  Behavior is normal.  Musculoskeletal: Surgical incisions well healed. Full dig range of motion. Sensation slightly diminished in tips of fingers.               Personal Interpretation of Diagnostic studies: None performed              Impression: Recurrent carpal tunnel syndrome right hand         Plan: No restrictions. Dissipate continued improvement. Follow up as needed for any issues or concerns.               Follow up: As needed for reoccurrence of symptoms             Bowen Ventura MD  [facility]  Department of Orthopaedic Surgery  Hand and Upper Extremity Reconstruction    Scribe Attestation  By signing my name below, Michelle FUCHS Scribe   attest that this documentation has been prepared under the direction and in the presence of Dr. Bowen Ventura.    Dictation performed with the use of voice  recognition software.  Syntax and grammatical errors may exist.

## 2024-04-10 ENCOUNTER — LAB (OUTPATIENT)
Dept: LAB | Facility: LAB | Age: 84
End: 2024-04-10
Payer: MEDICARE

## 2024-04-10 DIAGNOSIS — N18.32 STAGE 3B CHRONIC KIDNEY DISEASE (MULTI): ICD-10-CM

## 2024-04-10 DIAGNOSIS — N18.32 ANEMIA DUE TO STAGE 3B CHRONIC KIDNEY DISEASE (MULTI): ICD-10-CM

## 2024-04-10 DIAGNOSIS — D63.1 ANEMIA DUE TO STAGE 3B CHRONIC KIDNEY DISEASE (MULTI): ICD-10-CM

## 2024-04-10 DIAGNOSIS — I12.9 HYPERTENSIVE KIDNEY DISEASE WITH STAGE 3B CHRONIC KIDNEY DISEASE (MULTI): ICD-10-CM

## 2024-04-10 DIAGNOSIS — N18.32 HYPERTENSIVE KIDNEY DISEASE WITH STAGE 3B CHRONIC KIDNEY DISEASE (MULTI): ICD-10-CM

## 2024-04-10 LAB
ALBUMIN SERPL BCP-MCNC: 4.2 G/DL (ref 3.4–5)
ANION GAP SERPL CALC-SCNC: 10 MMOL/L (ref 10–20)
APPEARANCE UR: CLEAR
BILIRUB UR STRIP.AUTO-MCNC: NEGATIVE MG/DL
BUN SERPL-MCNC: 33 MG/DL (ref 6–23)
CALCIUM SERPL-MCNC: 9 MG/DL (ref 8.6–10.3)
CHLORIDE SERPL-SCNC: 109 MMOL/L (ref 98–107)
CO2 SERPL-SCNC: 26 MMOL/L (ref 21–32)
COLOR UR: NORMAL
CREAT SERPL-MCNC: 1.61 MG/DL (ref 0.5–1.3)
CREAT UR-MCNC: 54.1 MG/DL (ref 20–370)
CREAT UR-MCNC: 54.1 MG/DL (ref 20–370)
EGFRCR SERPLBLD CKD-EPI 2021: 42 ML/MIN/1.73M*2
ERYTHROCYTE [DISTWIDTH] IN BLOOD BY AUTOMATED COUNT: 13.2 % (ref 11.5–14.5)
FERRITIN SERPL-MCNC: 133 NG/ML (ref 20–300)
GLUCOSE SERPL-MCNC: 128 MG/DL (ref 74–99)
GLUCOSE UR STRIP.AUTO-MCNC: NEGATIVE MG/DL
HCT VFR BLD AUTO: 33.3 % (ref 41–52)
HGB BLD-MCNC: 10.9 G/DL (ref 13.5–17.5)
IRON SATN MFR SERPL: 46 % (ref 25–45)
IRON SERPL-MCNC: 111 UG/DL (ref 35–150)
KETONES UR STRIP.AUTO-MCNC: NEGATIVE MG/DL
LEUKOCYTE ESTERASE UR QL STRIP.AUTO: NEGATIVE
MCH RBC QN AUTO: 30.4 PG (ref 26–34)
MCHC RBC AUTO-ENTMCNC: 32.7 G/DL (ref 32–36)
MCV RBC AUTO: 93 FL (ref 80–100)
MICROALBUMIN UR-MCNC: <7 MG/L
MICROALBUMIN/CREAT UR: NORMAL MG/G{CREAT}
NITRITE UR QL STRIP.AUTO: NEGATIVE
NRBC BLD-RTO: 0 /100 WBCS (ref 0–0)
PH UR STRIP.AUTO: 5 [PH]
PHOSPHATE SERPL-MCNC: 3.6 MG/DL (ref 2.5–4.9)
PLATELET # BLD AUTO: 154 X10*3/UL (ref 150–450)
POTASSIUM SERPL-SCNC: 4.4 MMOL/L (ref 3.5–5.3)
PROT UR STRIP.AUTO-MCNC: NEGATIVE MG/DL
PROT UR-ACNC: 5 MG/DL (ref 5–25)
PROT/CREAT UR: 0.09 MG/MG CREAT (ref 0–0.17)
RBC # BLD AUTO: 3.59 X10*6/UL (ref 4.5–5.9)
RBC # UR STRIP.AUTO: NEGATIVE /UL
SODIUM SERPL-SCNC: 141 MMOL/L (ref 136–145)
SP GR UR STRIP.AUTO: 1.01
TIBC SERPL-MCNC: 243 UG/DL (ref 240–445)
UIBC SERPL-MCNC: 132 UG/DL (ref 110–370)
UROBILINOGEN UR STRIP.AUTO-MCNC: <2 MG/DL
WBC # BLD AUTO: 5.4 X10*3/UL (ref 4.4–11.3)

## 2024-04-10 PROCEDURE — 85027 COMPLETE CBC AUTOMATED: CPT

## 2024-04-10 PROCEDURE — 80069 RENAL FUNCTION PANEL: CPT

## 2024-04-10 PROCEDURE — 36415 COLL VENOUS BLD VENIPUNCTURE: CPT

## 2024-04-10 PROCEDURE — 83550 IRON BINDING TEST: CPT

## 2024-04-10 PROCEDURE — 81003 URINALYSIS AUTO W/O SCOPE: CPT

## 2024-04-10 PROCEDURE — 84156 ASSAY OF PROTEIN URINE: CPT

## 2024-04-10 PROCEDURE — 82570 ASSAY OF URINE CREATININE: CPT

## 2024-04-10 PROCEDURE — 83540 ASSAY OF IRON: CPT

## 2024-04-10 PROCEDURE — 82728 ASSAY OF FERRITIN: CPT

## 2024-04-10 PROCEDURE — 82043 UR ALBUMIN QUANTITATIVE: CPT

## 2024-04-15 ENCOUNTER — OFFICE VISIT (OUTPATIENT)
Dept: NEPHROLOGY | Facility: CLINIC | Age: 84
End: 2024-04-15
Payer: MEDICARE

## 2024-04-15 VITALS
HEART RATE: 60 BPM | HEIGHT: 70 IN | WEIGHT: 194 LBS | BODY MASS INDEX: 27.77 KG/M2 | SYSTOLIC BLOOD PRESSURE: 141 MMHG | DIASTOLIC BLOOD PRESSURE: 63 MMHG

## 2024-04-15 DIAGNOSIS — I12.9 HYPERTENSIVE KIDNEY DISEASE WITH STAGE 3B CHRONIC KIDNEY DISEASE (MULTI): Primary | ICD-10-CM

## 2024-04-15 DIAGNOSIS — N18.32 STAGE 3B CHRONIC KIDNEY DISEASE (MULTI): Primary | ICD-10-CM

## 2024-04-15 DIAGNOSIS — N18.32 HYPERTENSIVE KIDNEY DISEASE WITH STAGE 3B CHRONIC KIDNEY DISEASE (MULTI): Primary | ICD-10-CM

## 2024-04-15 PROCEDURE — 3077F SYST BP >= 140 MM HG: CPT | Performed by: INTERNAL MEDICINE

## 2024-04-15 PROCEDURE — 99213 OFFICE O/P EST LOW 20 MIN: CPT | Performed by: INTERNAL MEDICINE

## 2024-04-15 PROCEDURE — 1123F ACP DISCUSS/DSCN MKR DOCD: CPT | Performed by: INTERNAL MEDICINE

## 2024-04-15 PROCEDURE — 1160F RVW MEDS BY RX/DR IN RCRD: CPT | Performed by: INTERNAL MEDICINE

## 2024-04-15 PROCEDURE — 1159F MED LIST DOCD IN RCRD: CPT | Performed by: INTERNAL MEDICINE

## 2024-04-15 PROCEDURE — 1126F AMNT PAIN NOTED NONE PRSNT: CPT | Performed by: INTERNAL MEDICINE

## 2024-04-15 PROCEDURE — RXMED WILLOW AMBULATORY MEDICATION CHARGE

## 2024-04-15 PROCEDURE — 3078F DIAST BP <80 MM HG: CPT | Performed by: INTERNAL MEDICINE

## 2024-04-15 RX ORDER — DAPAGLIFLOZIN 5 MG/1
5 TABLET, FILM COATED ORAL DAILY
Qty: 90 TABLET | Refills: 3 | Status: SHIPPED | OUTPATIENT
Start: 2024-04-15 | End: 2025-04-15

## 2024-04-15 ASSESSMENT — PAIN SCALES - GENERAL: PAINLEVEL: 0-NO PAIN

## 2024-04-15 NOTE — PROGRESS NOTES
Chief Complaint: Follow up CKD    No specific complaints  Denies nausea, vomiting, chest pain, dyspnea  No urinary symptoms    NAD  Sclera AI s inj  MMM, no sores  Deferred secondary to COVID  No edema  No tremor  No rash    CKD stage 3b  HTN not at goal  Proteinuria none    Start SGLT2 now eliguard off, will hopefully see blood pressure closer to target with starting farxiga  Labs and follow up in 6 months

## 2024-04-18 ENCOUNTER — PHARMACY VISIT (OUTPATIENT)
Dept: PHARMACY | Facility: CLINIC | Age: 84
End: 2024-04-18
Payer: MEDICARE

## 2024-04-19 ENCOUNTER — TELEMEDICINE (OUTPATIENT)
Dept: PHARMACY | Facility: HOSPITAL | Age: 84
End: 2024-04-19
Payer: MEDICARE

## 2024-04-19 DIAGNOSIS — N18.32 HYPERTENSIVE KIDNEY DISEASE WITH STAGE 3B CHRONIC KIDNEY DISEASE (MULTI): ICD-10-CM

## 2024-04-19 DIAGNOSIS — I12.9 HYPERTENSIVE KIDNEY DISEASE WITH STAGE 3B CHRONIC KIDNEY DISEASE (MULTI): ICD-10-CM

## 2024-04-19 NOTE — PROGRESS NOTES
SAMEER LEBLANC was referred to the Clinical Pharmacy Team for restart of sglt2     Referring Provider: Noemi Garcia  _______________________________________________________________________  MEDICATION INITIATION ASSESSMENT  HPI: CKD stage 3. last EGFR 42 sCr 1.61      HTN: elevated at last ov. patient will monitor home bps to bring to next appt  /63  Medications  nebivolol 5 mg 1/2 tablet qd   losartan 25mg qd  glucose: well monitored and controlled     HLD: controlled mixed hld   LDL 78 dec2022  On statin? yes livalo 2mg qd (also on vascepa 1gm 2 caps bid     Medications reviewed for appropriate dosing in setting of CKD  Recommended changes:  - no adjustments needed at this time.        _______________________________________________________________________  PATIENT EDUCATION/DISCUSSION:  - Counseled patient on MOA, expectations, side effects, duration of therapy, contraindications, administration, and monitoring parameters  - Answered all patient questions and concerns  - covered well on patients insurance plan $29.10/month   _______________________________________________________________________  PLAN  1.       RESTART farxiga 5mg qd   2.       Prescription sent to [facility] pharmacy for assistance on authorization and copay. Medication will be mailed to patient.    Follow up 6/19 @

## 2024-04-30 ENCOUNTER — LAB (OUTPATIENT)
Dept: LAB | Facility: LAB | Age: 84
End: 2024-04-30
Payer: MEDICARE

## 2024-04-30 ENCOUNTER — TELEPHONE (OUTPATIENT)
Dept: HEMATOLOGY/ONCOLOGY | Facility: HOSPITAL | Age: 84
End: 2024-04-30

## 2024-04-30 DIAGNOSIS — N18.9 CHRONIC KIDNEY DISEASE, UNSPECIFIED CKD STAGE: ICD-10-CM

## 2024-04-30 DIAGNOSIS — D64.9 ANEMIA, UNSPECIFIED TYPE: Primary | ICD-10-CM

## 2024-04-30 DIAGNOSIS — D64.9 ANEMIA, UNSPECIFIED TYPE: ICD-10-CM

## 2024-04-30 DIAGNOSIS — E53.8 B12 DEFICIENCY: ICD-10-CM

## 2024-04-30 LAB
ALBUMIN SERPL BCP-MCNC: 4.2 G/DL (ref 3.4–5)
ALP SERPL-CCNC: 118 U/L (ref 33–136)
ALT SERPL W P-5'-P-CCNC: 13 U/L (ref 10–52)
ANION GAP SERPL CALC-SCNC: 12 MMOL/L (ref 10–20)
AST SERPL W P-5'-P-CCNC: 15 U/L (ref 9–39)
BASOPHILS # BLD AUTO: 0.03 X10*3/UL (ref 0–0.1)
BASOPHILS NFR BLD AUTO: 0.5 %
BILIRUB SERPL-MCNC: 0.3 MG/DL (ref 0–1.2)
BUN SERPL-MCNC: 31 MG/DL (ref 6–23)
CALCIUM SERPL-MCNC: 9 MG/DL (ref 8.6–10.3)
CHLORIDE SERPL-SCNC: 108 MMOL/L (ref 98–107)
CO2 SERPL-SCNC: 26 MMOL/L (ref 21–32)
CREAT SERPL-MCNC: 1.61 MG/DL (ref 0.5–1.3)
EGFRCR SERPLBLD CKD-EPI 2021: 42 ML/MIN/1.73M*2
EOSINOPHIL # BLD AUTO: 0.18 X10*3/UL (ref 0–0.4)
EOSINOPHIL NFR BLD AUTO: 2.9 %
ERYTHROCYTE [DISTWIDTH] IN BLOOD BY AUTOMATED COUNT: 13 % (ref 11.5–14.5)
FERRITIN SERPL-MCNC: 123 NG/ML (ref 20–300)
FOLATE SERPL-MCNC: >22.3 NG/ML
GLUCOSE SERPL-MCNC: 109 MG/DL (ref 74–99)
HCT VFR BLD AUTO: 35 % (ref 41–52)
HGB BLD-MCNC: 11.4 G/DL (ref 13.5–17.5)
IMM GRANULOCYTES # BLD AUTO: 0.02 X10*3/UL (ref 0–0.5)
IMM GRANULOCYTES NFR BLD AUTO: 0.3 % (ref 0–0.9)
IRON SATN MFR SERPL: 36 % (ref 25–45)
IRON SERPL-MCNC: 87 UG/DL (ref 35–150)
LYMPHOCYTES # BLD AUTO: 1.25 X10*3/UL (ref 0.8–3)
LYMPHOCYTES NFR BLD AUTO: 20.4 %
MCH RBC QN AUTO: 30.9 PG (ref 26–34)
MCHC RBC AUTO-ENTMCNC: 32.6 G/DL (ref 32–36)
MCV RBC AUTO: 95 FL (ref 80–100)
MONOCYTES # BLD AUTO: 0.51 X10*3/UL (ref 0.05–0.8)
MONOCYTES NFR BLD AUTO: 8.3 %
NEUTROPHILS # BLD AUTO: 4.15 X10*3/UL (ref 1.6–5.5)
NEUTROPHILS NFR BLD AUTO: 67.6 %
NRBC BLD-RTO: 0 /100 WBCS (ref 0–0)
PLATELET # BLD AUTO: 165 X10*3/UL (ref 150–450)
POTASSIUM SERPL-SCNC: 4.6 MMOL/L (ref 3.5–5.3)
PROT SERPL-MCNC: 6.9 G/DL (ref 6.4–8.2)
RBC # BLD AUTO: 3.69 X10*6/UL (ref 4.5–5.9)
SODIUM SERPL-SCNC: 141 MMOL/L (ref 136–145)
TIBC SERPL-MCNC: 244 UG/DL (ref 240–445)
UIBC SERPL-MCNC: 157 UG/DL (ref 110–370)
VIT B12 SERPL-MCNC: 328 PG/ML (ref 211–911)
WBC # BLD AUTO: 6.1 X10*3/UL (ref 4.4–11.3)

## 2024-04-30 PROCEDURE — 83540 ASSAY OF IRON: CPT

## 2024-04-30 PROCEDURE — 82668 ASSAY OF ERYTHROPOIETIN: CPT

## 2024-04-30 PROCEDURE — 82607 VITAMIN B-12: CPT

## 2024-04-30 PROCEDURE — 80053 COMPREHEN METABOLIC PANEL: CPT

## 2024-04-30 PROCEDURE — 82728 ASSAY OF FERRITIN: CPT

## 2024-04-30 PROCEDURE — 82746 ASSAY OF FOLIC ACID SERUM: CPT

## 2024-04-30 PROCEDURE — 36415 COLL VENOUS BLD VENIPUNCTURE: CPT

## 2024-04-30 PROCEDURE — 83921 ORGANIC ACID SINGLE QUANT: CPT

## 2024-04-30 PROCEDURE — 85025 COMPLETE CBC W/AUTO DIFF WBC: CPT

## 2024-04-30 PROCEDURE — 83550 IRON BINDING TEST: CPT

## 2024-04-30 NOTE — TELEPHONE ENCOUNTER
Dr Zelaya confirmed lab orders placed by NAKUL Marroquin PAC cover all needed lab orders.   Pt notified.

## 2024-05-02 ENCOUNTER — OFFICE VISIT (OUTPATIENT)
Dept: HEMATOLOGY/ONCOLOGY | Facility: HOSPITAL | Age: 84
End: 2024-05-02
Payer: MEDICARE

## 2024-05-02 VITALS
DIASTOLIC BLOOD PRESSURE: 66 MMHG | WEIGHT: 196.6 LBS | SYSTOLIC BLOOD PRESSURE: 139 MMHG | HEART RATE: 52 BPM | OXYGEN SATURATION: 96 % | RESPIRATION RATE: 16 BRPM | HEIGHT: 69 IN | TEMPERATURE: 97.3 F | BODY MASS INDEX: 29.12 KG/M2

## 2024-05-02 DIAGNOSIS — N18.9 ANEMIA OF RENAL DISEASE: ICD-10-CM

## 2024-05-02 DIAGNOSIS — E53.8 B12 DEFICIENCY: Primary | ICD-10-CM

## 2024-05-02 DIAGNOSIS — N18.9 CHRONIC KIDNEY DISEASE, UNSPECIFIED CKD STAGE: ICD-10-CM

## 2024-05-02 DIAGNOSIS — D63.1 ANEMIA OF RENAL DISEASE: ICD-10-CM

## 2024-05-02 DIAGNOSIS — D64.9 ANEMIA, UNSPECIFIED TYPE: ICD-10-CM

## 2024-05-02 PROCEDURE — 1123F ACP DISCUSS/DSCN MKR DOCD: CPT | Performed by: STUDENT IN AN ORGANIZED HEALTH CARE EDUCATION/TRAINING PROGRAM

## 2024-05-02 PROCEDURE — 99214 OFFICE O/P EST MOD 30 MIN: CPT | Performed by: STUDENT IN AN ORGANIZED HEALTH CARE EDUCATION/TRAINING PROGRAM

## 2024-05-02 PROCEDURE — 3075F SYST BP GE 130 - 139MM HG: CPT | Performed by: STUDENT IN AN ORGANIZED HEALTH CARE EDUCATION/TRAINING PROGRAM

## 2024-05-02 PROCEDURE — 3078F DIAST BP <80 MM HG: CPT | Performed by: STUDENT IN AN ORGANIZED HEALTH CARE EDUCATION/TRAINING PROGRAM

## 2024-05-02 PROCEDURE — 1157F ADVNC CARE PLAN IN RCRD: CPT | Performed by: STUDENT IN AN ORGANIZED HEALTH CARE EDUCATION/TRAINING PROGRAM

## 2024-05-02 PROCEDURE — 1160F RVW MEDS BY RX/DR IN RCRD: CPT | Performed by: STUDENT IN AN ORGANIZED HEALTH CARE EDUCATION/TRAINING PROGRAM

## 2024-05-02 PROCEDURE — 1159F MED LIST DOCD IN RCRD: CPT | Performed by: STUDENT IN AN ORGANIZED HEALTH CARE EDUCATION/TRAINING PROGRAM

## 2024-05-02 PROCEDURE — 1126F AMNT PAIN NOTED NONE PRSNT: CPT | Performed by: STUDENT IN AN ORGANIZED HEALTH CARE EDUCATION/TRAINING PROGRAM

## 2024-05-02 ASSESSMENT — PAIN SCALES - GENERAL: PAINLEVEL: 0-NO PAIN

## 2024-05-02 NOTE — PROGRESS NOTES
"Patient ID: Dago Han \"Arcadio\" is a 83 y.o. male.  Referring Physician: Issa Jorgensen MD  [id number] [place] [address]  Primary Care Provider: Lea Watson MD  Visit Type: Follow Up  Diagnosis: Anemia      Subjective    HPI  83 y.o. male with a PMH significant for prostate cancer (had local RT and remains on ADT), h/o CVA/TIA s/p endarterectomy on clopidogrel,  HTN controlled, who is followed for anemia.      Has mild anemia Hb 11.5-12.5 since 2016, NCNC, mild thrombocytopenia in the past which has resolved. Pt has no particular complaints. Is taking gabapentin for hot flashes, brain fog, tingling in extremities since starting the ADT. Has had prostate cancer  since last 2yrs. Also has fatigue. Joint pains, has had back surgery x2. PSA has been undetectable.   Pt is a practicing neuro-psychologist.   Age appropriate cancer screening: adequate   FMH: ister has 'sticky platelets', daughter had breast cancer, mother had MM, father ?lung cancer, brothers in their 60s with MM and bladder cancer.   Social history: lives in [facility], semi-retired / neuro psychology, professor at [employer], 3 children, 1 daughter, son and daughter, quit smoking 50 yrs ago (per 1/2023 visit), no ETOH, no RDA.   12/14/2023: pt being seen in follow up for anemia, no particular complaints other than recent ?PNA needing admission while he was traveling and prolonged antibiotics. Appears mostly recovered now.  05/02/24: presents for follow up, has been taking PO iron, adjusting the mode of intake recently but otherwise well tolerated.     Review of Systems - Oncology  10 point review of systems negative except as stated in HPI    Objective   Past Surgical History:   Procedure Laterality Date    LUMBAR LAMINECTOMY  10/17/2013    Laminectomy Lumbar    MR HEAD ANGIO WO IV CONTRAST  10/29/2013    MR HEAD ANGIO WO IV CONTRAST 10/29/2013 [facility]CILLARY LEGAC[facility]    MR HEAD ANGIO WO IV CONTRAST  10/18/2016    MR HEAD ANGIO WO IV " "CONTRAST 10/18/2016 [facility] ANCILLARY LEGACY    MR HEAD ANGIO WO IV CONTRAST  12/14/2022    MR HEAD ANGIO WO IV CONTRAST 12/14/2022 DOCTOR OFFICE LEGACY    MR NECK ANGIO WO IV CONTRAST  11/1/2019    MR NECK ANGIO WO IV CONTRAST 11/1/2019 [facility] CLINICAL LEGACY    MR NECK ANGIO WO IV CONTRAST  6/19/2021    MR NECK ANGIO WO IV CONTRAST 6/19/2021 [facility] ANCILLARY LEGACY    MR NECK ANGIO WO IV CONTRAST  11/8/2016    MR NECK ANGIO WO IV CONTRAST 11/8/2016 [facility] ANCILLARY LEGACY    MR NECK ANGIO WO IV CONTRAST  12/14/2022    MR NECK ANGIO WO IV CONTRAST 12/14/2022 DOCTOR OFFICE LEGACY    OTHER SURGICAL HISTORY  10/17/2013    Transurethral Resection Of Prostate    OTHER SURGICAL HISTORY  02/13/2014    Cardiac Cath Procedure Outcome: Successful     Oncology History    No history exists.       Physical Exam  Vitals reviewed.   Constitutional:       General: He is not in acute distress.     Appearance: Normal appearance. He is not toxic-appearing.   Eyes:      Comments: No pallor or icterus    Pulmonary:      Effort: Pulmonary effort is normal.   Neurological:      General: No focal deficit present.      Mental Status: He is alert and oriented to person, place, and time.   Psychiatric:         Mood and Affect: Mood normal.       BSA: 2.08 meters squared  /66 (BP Location: Left arm, Patient Position: Sitting, BP Cuff Size: Large adult)   Pulse 52   Temp 36.3 °C (97.3 °F)   Resp 16   Ht (S) 1.748 m (5' 8.82\")   Wt 89.2 kg (196 lb 9.6 oz)   SpO2 96%   BMI 29.19 kg/m²     Labs:  Lab Results   Component Value Date    WBC 6.1 04/30/2024    NEUTROABS 4.15 04/30/2024    IGABSOL 0.02 04/30/2024    LYMPHSABS 1.25 04/30/2024    MONOSABS 0.51 04/30/2024    EOSABS 0.18 04/30/2024    BASOSABS 0.03 04/30/2024    RBC 3.69 (L) 04/30/2024    MCV 95 04/30/2024    MCHC 32.6 04/30/2024    HGB 11.4 (L) 04/30/2024    HCT 35.0 (L) 04/30/2024     04/30/2024     Lab Results   Component Value Date    RETICCTPCT 2.0 12/07/2023      Lab Results "   Component Value Date    CREATININE 1.61 (H) 04/30/2024    BUN 31 (H) 04/30/2024    EGFR 42 (L) 04/30/2024     04/30/2024    K 4.6 04/30/2024     (H) 04/30/2024    CO2 26 04/30/2024     Lab Results   Component Value Date    IRON 87 04/30/2024    TIBC 244 04/30/2024    FERRITIN 123 04/30/2024      Lab Results   Component Value Date    YTYSOHXS57 328 04/30/2024       Assessment/Plan    83 y.o. male with a PMH significant for prostate cancer (had local RT and remains on ADT), h/o CVA/TIA s/p endarterectomy on clopidogrel, HTN controlled, who is followed for anemia.     # Anemia:  chronic mild anemia Hb 11.5-12.5 since 2016, NCNC, mild thrombocytopenia in the past which has resolved. Pt has no particular complaints. Smear to be reviewed. Work up form this visit showed mild persistent NCNC anemia, other cell lines WNL. Hypoproliferative retic response. B12 and folate WNL, iron not significantly deficient. Myeloma work up excluding urine studies not showing evidence of a monoclonal process, hemolysis markers are negative. Renal function is progressively worse over last 2 yrs. He was started on PO iron given mild iron deficiency and repeat his labs are normalized. However the mild anemia remains. EPO was added and is relatively low, with hypoproliferative retic. His MMA is also mildly elevated with a borderline B12 level, which could be explained by his CKD however mild B12 def is not excluded, to that end MCV has gradually creeped up over time.   Work up suggests anemia of androgen deprivation, inflammation, renal disease, mild B12 borderline deficiency  Plan:   - given normal iron panel can stop PO iron, would recommend trial of PO B12 and repeat in 3 months  - consider EPO if his Hb drop persistently below 9 in future with known kidney disease  Follow up: recheck CBC and B12 in 3 months, follow up after       Issa Zelaya MD

## 2024-05-04 LAB — EPO SERPL-ACNC: 14 MU/ML (ref 4–27)

## 2024-05-05 LAB — METHYLMALONATE SERPL-SCNC: 0.41 UMOL/L (ref 0–0.4)

## 2024-05-15 RX ORDER — LANOLIN ALCOHOL/MO/W.PET/CERES
1000 CREAM (GRAM) TOPICAL DAILY
Qty: 90 TABLET | Refills: 1 | Status: SHIPPED | OUTPATIENT
Start: 2024-05-15

## 2024-05-16 ENCOUNTER — TELEPHONE (OUTPATIENT)
Dept: HEMATOLOGY/ONCOLOGY | Facility: HOSPITAL | Age: 84
End: 2024-05-16
Payer: MEDICARE

## 2024-05-16 NOTE — TELEPHONE ENCOUNTER
----- Message from Issa Jorgensen MD sent at 5/15/2024 10:22 PM EDT -----  Iron panel has normalized, ok to take a break from PO iron.   The B12 and metabolites suggests mild deficiency vs effect of CKD, ok to try B12 for 3 months and then repeat testing. I have sent script to [facility].   The EPO suggest effect of anemia of kidney disease as well. Recheck labs in 3 months

## 2024-05-22 DIAGNOSIS — C61 ADENOCARCINOMA OF PROSTATE (MULTI): ICD-10-CM

## 2024-05-22 RX ORDER — GABAPENTIN 300 MG/1
300 CAPSULE ORAL 3 TIMES DAILY
Qty: 90 CAPSULE | Refills: 1 | Status: SHIPPED | OUTPATIENT
Start: 2024-05-22 | End: 2025-05-22

## 2024-06-11 ENCOUNTER — APPOINTMENT (OUTPATIENT)
Dept: HEMATOLOGY/ONCOLOGY | Facility: CLINIC | Age: 84
End: 2024-06-11
Payer: MEDICARE

## 2024-06-18 ENCOUNTER — APPOINTMENT (OUTPATIENT)
Dept: RADIATION ONCOLOGY | Facility: HOSPITAL | Age: 84
End: 2024-06-18
Payer: MEDICARE

## 2024-06-18 ENCOUNTER — OFFICE VISIT (OUTPATIENT)
Dept: ORTHOPEDIC SURGERY | Facility: HOSPITAL | Age: 84
End: 2024-06-18
Payer: MEDICARE

## 2024-06-18 ENCOUNTER — HOSPITAL ENCOUNTER (OUTPATIENT)
Dept: RADIOLOGY | Facility: HOSPITAL | Age: 84
Discharge: HOME | End: 2024-06-18
Payer: MEDICARE

## 2024-06-18 VITALS — WEIGHT: 190 LBS | HEIGHT: 69 IN | BODY MASS INDEX: 28.14 KG/M2

## 2024-06-18 DIAGNOSIS — M25.532 LEFT WRIST PAIN: ICD-10-CM

## 2024-06-18 DIAGNOSIS — S62.102A WRIST FRACTURE, CLOSED, LEFT, INITIAL ENCOUNTER: ICD-10-CM

## 2024-06-18 DIAGNOSIS — S52.572A OTHER INTRAARTICULAR FRACTURE OF LOWER END OF LEFT RADIUS, INITIAL ENCOUNTER FOR CLOSED FRACTURE: Primary | ICD-10-CM

## 2024-06-18 PROCEDURE — 99214 OFFICE O/P EST MOD 30 MIN: CPT | Performed by: SPECIALIST/TECHNOLOGIST

## 2024-06-18 PROCEDURE — 1125F AMNT PAIN NOTED PAIN PRSNT: CPT | Performed by: SPECIALIST/TECHNOLOGIST

## 2024-06-18 PROCEDURE — 29125 APPL SHORT ARM SPLINT STATIC: CPT | Performed by: SPECIALIST/TECHNOLOGIST

## 2024-06-18 PROCEDURE — L3670 SO ACRO/CLAV CAN WEB PRE OTS: HCPCS | Performed by: SPECIALIST/TECHNOLOGIST

## 2024-06-18 PROCEDURE — 1160F RVW MEDS BY RX/DR IN RCRD: CPT | Performed by: SPECIALIST/TECHNOLOGIST

## 2024-06-18 PROCEDURE — 29125 APPL SHORT ARM SPLINT STATIC: CPT | Mod: MUE | Performed by: SPECIALIST/TECHNOLOGIST

## 2024-06-18 PROCEDURE — 73100 X-RAY EXAM OF WRIST: CPT | Mod: LEFT SIDE | Performed by: RADIOLOGY

## 2024-06-18 PROCEDURE — 1123F ACP DISCUSS/DSCN MKR DOCD: CPT | Performed by: SPECIALIST/TECHNOLOGIST

## 2024-06-18 PROCEDURE — 73100 X-RAY EXAM OF WRIST: CPT | Mod: LT

## 2024-06-18 PROCEDURE — 1157F ADVNC CARE PLAN IN RCRD: CPT | Performed by: SPECIALIST/TECHNOLOGIST

## 2024-06-18 PROCEDURE — 1159F MED LIST DOCD IN RCRD: CPT | Performed by: SPECIALIST/TECHNOLOGIST

## 2024-06-18 ASSESSMENT — PAIN - FUNCTIONAL ASSESSMENT: PAIN_FUNCTIONAL_ASSESSMENT: 0-10

## 2024-06-18 ASSESSMENT — PAIN SCALES - GENERAL: PAINLEVEL_OUTOF10: 4

## 2024-06-18 ASSESSMENT — PAIN DESCRIPTION - DESCRIPTORS: DESCRIPTORS: TENDER

## 2024-06-18 NOTE — PROGRESS NOTES
"Chief Complaint: Pain of the Left Wrist       HPI:  Dago Han \"Arcadio\" is a 83 y.o. right-hand-dominant male presenting to the orthopedic walk-in clinic with his wife for a left wrist injury occurring on 6/13/2024 when he fell on an outstretched hand.  He was carrying a 24 pack of water when his foot got caught on their door and he landed directly onto his left hand and wrist.  He was previously seen by his primary care physician on 6/14/2024 where x-rays were taken.  His primary care physician called him with the results and referred him to orthopedics.  Today he reports an achy sensation over the distal radius that worsens with direct pressure.  He has not done anything for this since time of injury.  He has a history of left carpal tunnel release performed by Dr. Garcia approximately 15 years ago.  He denies any numbness or tingling in the left hand or fingers.  He presents for treatment recommendations.    Objective     ROS:  Constitutional: No fever, no chills, not feeling tired, no recent weight gain and no recent weight loss  ENT: No nosebleeds  Cardiovascular: No chest pain  Respiratory: No shortness of breath and no cough  Gastrointestinal: No abdominal pain, no nausea, no diarrhea, and no vomiting  Musculoskeletal: Positive for left wrist pain.  Integumentary: No rashes and no skin lesions  Neurological: No headache  Psychiatric: No sleep disturbances no depression  Endocrine: No muscle weakness and no muscle cramps  Hematologic/lymphatic: No swelling glands and no tendency for easy bruising    Past Medical History:   Diagnosis Date    Acute bronchitis due to other specified organisms 09/05/2019    Acute bacterial bronchitis    Acute frontal sinusitis, unspecified 02/28/2018    Acute frontal sinusitis    Allergy status to unspecified drugs, medicaments and biological substances 08/19/2016    History of adverse drug reaction    Anal fistula     Anal fistula    Atherosclerotic heart disease of native " coronary artery without angina pectoris 12/22/2022    Atherosclerotic heart disease of native coronary artery without angina pectoris    Bruit of right carotid artery 08/22/2023    Candidiasis of skin and nail 12/06/2017    Yeast dermatitis    Carotid artery obstruction, 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    Carotid stenosis, asymptomatic, right 08/22/2023    Cellulitis of unspecified toe 02/12/2016    Paronychia of toe    Chronic sinusitis, unspecified 09/26/2019    Sinobronchitis    Contact with and (suspected) exposure to unspecified communicable disease 06/21/2017    Exposure to communicable disease    Essential (primary) hypertension 09/12/2022    Essential hypertension    Headache, unspecified 02/27/2018    Chronic daily headache    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    Anemia due to blood loss    Lumbago with sciatica, right side 05/16/2019    Chronic right-sided low back pain with right-sided sciatica    Mixed hyperlipidemia 02/28/2022    Mixed hyperlipidemia    Occlusion and stenosis of bilateral carotid arteries     Carotid artery obstruction, bilateral    Olecranon bursitis, left elbow 12/06/2017    Olecranon bursitis of left elbow    Other abnormalities of gait and mobility 10/15/2019    Imbalance    Other allergic rhinitis 04/26/2016    Perennial allergic rhinitis    Other symptoms and signs involving general sensations and perceptions 10/05/2016    Sensation of pressure in face    Other symptoms and signs involving the musculoskeletal system 08/24/2018    Arm weakness    Pain in right ankle and joints of right foot 03/23/2018    Right ankle pain    Pain in unspecified foot 03/11/2015    Foot pain    Pain in unspecified hand 07/01/2014    Hand pain    Personal history of diseases of the skin and subcutaneous tissue 03/11/2015    History of onychia and paronychia    Personal history of other diseases of male genital organs 09/06/2016    History of acute prostatitis    Personal history  of other diseases of male genital organs 03/05/2014    History of prostatitis    Personal history of other diseases of male genital organs 06/28/2016    History of benign prostatic hyperplasia    Personal history of other diseases of the musculoskeletal system and connective tissue 02/27/2018    History of muscle pain    Personal history of other diseases of the nervous system and sense organs 10/17/2013    History of diplopia    Personal history of other diseases of the nervous system and sense organs 11/14/2014    History of carpal tunnel syndrome    Personal history of other diseases of the respiratory system 09/24/2016    History of chronic sinusitis    Personal history of other diseases of the respiratory system 10/09/2014    History of chronic sinusitis    Personal history of other diseases of the respiratory system 03/30/2022    History of acute bacterial sinusitis    Personal history of other diseases of the respiratory system 08/02/2017    History of acute bacterial sinusitis    Personal history of other diseases of the respiratory system 09/19/2016    History of sinusitis    Personal history of other specified conditions 10/10/2016    History of dizziness    Personal history of other specified conditions 11/06/2014    History of vertigo    Personal history of other specified conditions 03/28/2017    History of urinary hesitancy    Polyp of cecum 01/14/2019    Rectal abscess 11/01/2017    Perirectal cellulitis    Rectal abscess 05/15/2017    Perirectal abscess    Sensorineural hearing loss, bilateral 10/10/2016    Bilateral sensorineural hearing loss    Strain of muscle, fascia and tendon of lower back, initial encounter 11/30/2021    Lumbar strain    Testicular pain, unspecified 04/09/2019    Persistent testicular pain    Unspecified acute conjunctivitis, bilateral 03/28/2017    Conjunctivitis, acute, bilateral    Unspecified otitis externa, unspecified ear 03/12/2015    Otitis externa    Unspecified  symptoms and signs involving the genitourinary system 12/21/2017    UTI symptoms        Past Surgical History:   Procedure Laterality Date    LUMBAR LAMINECTOMY  10/17/2013    Laminectomy Lumbar    MR HEAD ANGIO WO IV CONTRAST  10/29/2013    MR HEAD ANGIO WO IV CONTRAST 10/29/2013 [facility]CILL[facility]GAC[facility]    MR HEAD ANGIO WO IV CONTRAST  10/18/2016    MR HEAD ANGIO WO IV CONTRAST 10/18/2016 [facility]CILL[facility]GAC[facility]    MR HEAD ANGIO WO IV CONTRAST  12/14/2022    MR HEAD ANGIO WO IV CONTRAST 12/14/2022 [facility] OFF[facility] LEGAC[facility]    MR NECK ANGIO WO IV CONTRAST  11/1/2019    MR NECK ANGIO WO IV CONTRAST 11/1/2019 [facility] CLINICAL LEGAC[facility]    MR NECK ANGIO WO IV CONTRAST  6/19/2021    MR NECK ANGIO WO IV CONTRAST 6/19/2021 [facility]CILLARY [facility]GAC[facility]    MR NECK ANGIO WO IV CONTRAST  11/8/2016    MR NECK ANGIO WO IV CONTRAST 11/8/2016 [facility]GAC[facility]    MR NECK ANGIO WO IV CONTRAST  12/14/2022    MR NECK ANGIO WO IV CONTRAST 12/14/2022 [facility]CTOR OFFICE LEGACY    OTHER SURGICAL HISTORY  10/17/2013    Transurethral Resection Of Prostate    OTHER SURGICAL HISTORY  02/13/2014    Cardiac Cath Procedure Outcome: Successful        Social Connections: Not on file          Physical Exam:  General appearance: WN, WD male in no acute distress  Skin: No rashes, lesions or wounds  Head: Normocephalic, no evidence of trauma  Eye: EOMI, conjunctiva clear, no discharge  ENT: Nares patent  Neck: No abnormal contour, tracheal midline  Chest/lungs: No respiratory distress, speaking in complete sentences  Musculoskeletal: Tender to palpation over the distal radius.  Discomfort over the DRUJ.  Nontender radial styloid.  Nontender ulnar styloid.  Nontender anatomical snuffbox.  5/5  strength bilaterally.  4/5 manual muscle testing wrist flexion and extension secondary to discomfort.  2+ radial pulses bilaterally.  No decreased ROM, muscle wasting, rigidity    Neurological: A&O x3, no focal deficits, intact bilateral UE  Psych: normal affect, mood,  appearance      Image Results:  X-rays from an outside facility dated 6/14/2024 were reviewed with the patient and his wife in the office today and show a nondisplaced intra-articular distal radius fracture.  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  X-rays taken on 6/18/2024 of the anatomical snuffbox were negative for fracture or dislocation.      Assessment/Plan   Encounter Diagnoses:  Left wrist pain    Wrist fracture, closed, left, initial encounter    Orders Placed This Encounter    Sling       The patient, his wife and I discussed his clinical presentation and physical exam findings consistent with a nondisplaced intra-articular distal radius fracture.  We discussed his conservative and surgical treatment options.  Given his age, arthritic changes and nondominant arm the likelihood is that this can be treated conservatively and without surgical interventions.  Today, we agreed upon conservative management at this time.  He will be placed in a sugar-tong splint.  He tolerated the application well.  He may take two 500 mg extra strength Tylenol up to 3 times a day as needed for pain.  He may ice his wrist for 20 minutes at a time 2-3 times per day.  I encouraged him to elevate his left upper extremity is much as possible throughout the day.  He was provided with a sling for comfort.  He will follow-up on 7/8/2024 with Tanisha Jasso PA-C.  All of his questions have been answered.  He is in agreement this plan.    SPLINTING / CASTING / STRAPPING [FON473]    Date/Time: 6/18/2024 5:41 PM    Performed by: Henry Jefferson PA-C  Authorized by: Henry Jefferson PA-C    Consent:     Consent obtained:  Verbal    Consent given by:  Patient    Risks, benefits, and alternatives were discussed: yes      Risks discussed:  Pain, numbness and discoloration  Universal protocol:     Procedure explained and questions answered to patient or proxy's satisfaction: yes      Test results  available: yes      Imaging studies available: yes      Required blood products, implants, devices, and special equipment available: no      Patient identity confirmed:  Verbally with patient  Pre-procedure details:     Distal neurologic exam:  Normal    Distal perfusion: distal pulses strong    Procedure details:     Location:  Wrist    Wrist location:  L wrist    Strapping: no      Cast type:  Long arm    Splint type:  Sugar tong    Supplies:  Elastic bandage, plaster, cotton padding and sling  Post-procedure details:     Distal neurologic exam:  Normal    Distal perfusion: brisk capillary refill      Procedure completion:  Tolerated well, no immediate complications    Post-procedure imaging: not applicable      Patient was prescribed a sling for right intra-articular nondisplaced distal radius fracture. The patient has weakness, instability and/or deformity of their right wrist which requires stabilization from this orthosis to improve their function.      Verbal and written instructions for the use, wear schedule, cleaning and application of this item were given.  Patient was instructed that should the brace result in increased pain, decreased sensation, increased swelling, or an overall worsening of their medical condition, to please contact our office immediately.     Orthotic management and training was provided for skin care, modifications due to healing tissues, edema changes, interruption in skin integrity, and safety precautions with the orthosis.      ** This office note was dictated using Dragon voice to text software and was not proofread for spelling or grammatical errors **

## 2024-06-19 ENCOUNTER — APPOINTMENT (OUTPATIENT)
Dept: PHARMACY | Facility: HOSPITAL | Age: 84
End: 2024-06-19
Payer: MEDICARE

## 2024-06-19 DIAGNOSIS — N18.32 HYPERTENSIVE KIDNEY DISEASE WITH STAGE 3B CHRONIC KIDNEY DISEASE (MULTI): Primary | ICD-10-CM

## 2024-06-19 DIAGNOSIS — I12.9 HYPERTENSIVE KIDNEY DISEASE WITH STAGE 3B CHRONIC KIDNEY DISEASE (MULTI): Primary | ICD-10-CM

## 2024-06-19 NOTE — PROGRESS NOTES
SAMEER LEBLANC was referred to the Clinical Pharmacy Team for restart of sglt2     Referring Provider: Noemi Garcia  _______________________________________________________________________  MEDICATION INITIATION ASSESSMENT  HPI: CKD stage 3. last EGFR 42 sCr 1.61   Restarted on farxig 5mg. So far tolerating ok. Has been having continued issues with hotflashes which exacerbate urinary frequency. We will continue on the 5mg dose for the time being.  HTN: elevated at last ov. patient will monitor home bps to bring to next appt  /63  Medications  nebivolol 5 mg 1/2 tablet qd   losartan 25mg qd  glucose: well monitored and controlled     HLD: controlled mixed hld   LDL 78 dec2022  On statin? yes livalo 2mg qd (also on vascepa 1gm 2 caps bid     Medications reviewed for appropriate dosing in setting of CKD  Recommended changes:  - no adjustments needed at this time.        _______________________________________________________________________  PATIENT EDUCATION/DISCUSSION:  - Counseled patient on MOA, expectations, side effects, duration of therapy, contraindications, administration, and monitoring parameters  - Answered all patient questions and concerns  - covered well on patients insurance plan $29.10/month     _______________________________________________________________________  PLAN  1.       Continue farxiga 5mg qd   2.       Prescription sent to [facility] pharmacy for assistance on authorization and copay. Medication will be mailed to patient.    Follow up 10/8 @

## 2024-06-20 ENCOUNTER — LAB (OUTPATIENT)
Dept: LAB | Facility: LAB | Age: 84
End: 2024-06-20
Payer: MEDICARE

## 2024-06-20 DIAGNOSIS — C61 ADENOCARCINOMA OF PROSTATE (MULTI): ICD-10-CM

## 2024-06-20 LAB
ALBUMIN SERPL BCP-MCNC: 4 G/DL (ref 3.4–5)
ALP SERPL-CCNC: 109 U/L (ref 33–136)
ALT SERPL W P-5'-P-CCNC: 13 U/L (ref 10–52)
ANION GAP SERPL CALC-SCNC: 12 MMOL/L (ref 10–20)
AST SERPL W P-5'-P-CCNC: 14 U/L (ref 9–39)
BASOPHILS # BLD AUTO: 0.03 X10*3/UL (ref 0–0.1)
BASOPHILS NFR BLD AUTO: 0.4 %
BILIRUB SERPL-MCNC: 0.3 MG/DL (ref 0–1.2)
BUN SERPL-MCNC: 33 MG/DL (ref 6–23)
CALCIUM SERPL-MCNC: 8.6 MG/DL (ref 8.6–10.3)
CHLORIDE SERPL-SCNC: 109 MMOL/L (ref 98–107)
CO2 SERPL-SCNC: 25 MMOL/L (ref 21–32)
CREAT SERPL-MCNC: 1.64 MG/DL (ref 0.5–1.3)
EGFRCR SERPLBLD CKD-EPI 2021: 42 ML/MIN/1.73M*2
EOSINOPHIL # BLD AUTO: 0.19 X10*3/UL (ref 0–0.4)
EOSINOPHIL NFR BLD AUTO: 2.6 %
ERYTHROCYTE [DISTWIDTH] IN BLOOD BY AUTOMATED COUNT: 13 % (ref 11.5–14.5)
GLUCOSE SERPL-MCNC: 109 MG/DL (ref 74–99)
HCT VFR BLD AUTO: 33.9 % (ref 41–52)
HGB BLD-MCNC: 11.3 G/DL (ref 13.5–17.5)
IMM GRANULOCYTES # BLD AUTO: 0.03 X10*3/UL (ref 0–0.5)
IMM GRANULOCYTES NFR BLD AUTO: 0.4 % (ref 0–0.9)
LYMPHOCYTES # BLD AUTO: 1.26 X10*3/UL (ref 0.8–3)
LYMPHOCYTES NFR BLD AUTO: 17.6 %
MCH RBC QN AUTO: 31 PG (ref 26–34)
MCHC RBC AUTO-ENTMCNC: 33.3 G/DL (ref 32–36)
MCV RBC AUTO: 93 FL (ref 80–100)
MONOCYTES # BLD AUTO: 0.55 X10*3/UL (ref 0.05–0.8)
MONOCYTES NFR BLD AUTO: 7.7 %
NEUTROPHILS # BLD AUTO: 5.11 X10*3/UL (ref 1.6–5.5)
NEUTROPHILS NFR BLD AUTO: 71.3 %
NRBC BLD-RTO: 0 /100 WBCS (ref 0–0)
PLATELET # BLD AUTO: 201 X10*3/UL (ref 150–450)
POTASSIUM SERPL-SCNC: 4.5 MMOL/L (ref 3.5–5.3)
PROT SERPL-MCNC: 6.4 G/DL (ref 6.4–8.2)
PSA SERPL-MCNC: 0.02 NG/ML
RBC # BLD AUTO: 3.64 X10*6/UL (ref 4.5–5.9)
SODIUM SERPL-SCNC: 141 MMOL/L (ref 136–145)
TESTOST SERPL-MCNC: <30 NG/DL (ref 240–1000)
WBC # BLD AUTO: 7.2 X10*3/UL (ref 4.4–11.3)

## 2024-06-20 PROCEDURE — 84153 ASSAY OF PSA TOTAL: CPT

## 2024-06-20 PROCEDURE — 36415 COLL VENOUS BLD VENIPUNCTURE: CPT

## 2024-06-20 PROCEDURE — 84403 ASSAY OF TOTAL TESTOSTERONE: CPT

## 2024-06-20 PROCEDURE — 85025 COMPLETE CBC W/AUTO DIFF WBC: CPT

## 2024-06-20 PROCEDURE — 80053 COMPREHEN METABOLIC PANEL: CPT

## 2024-06-26 ENCOUNTER — OFFICE VISIT (OUTPATIENT)
Dept: HEMATOLOGY/ONCOLOGY | Facility: CLINIC | Age: 84
End: 2024-06-26
Payer: MEDICARE

## 2024-06-26 VITALS
SYSTOLIC BLOOD PRESSURE: 154 MMHG | TEMPERATURE: 97 F | OXYGEN SATURATION: 96 % | HEART RATE: 61 BPM | BODY MASS INDEX: 29.3 KG/M2 | DIASTOLIC BLOOD PRESSURE: 68 MMHG | RESPIRATION RATE: 18 BRPM | WEIGHT: 198.41 LBS

## 2024-06-26 DIAGNOSIS — C61 ADENOCARCINOMA OF PROSTATE (MULTI): ICD-10-CM

## 2024-06-26 PROCEDURE — 1159F MED LIST DOCD IN RCRD: CPT | Performed by: INTERNAL MEDICINE

## 2024-06-26 PROCEDURE — 99214 OFFICE O/P EST MOD 30 MIN: CPT | Performed by: INTERNAL MEDICINE

## 2024-06-26 PROCEDURE — 1157F ADVNC CARE PLAN IN RCRD: CPT | Performed by: INTERNAL MEDICINE

## 2024-06-26 PROCEDURE — 3078F DIAST BP <80 MM HG: CPT | Performed by: INTERNAL MEDICINE

## 2024-06-26 PROCEDURE — 1036F TOBACCO NON-USER: CPT | Performed by: INTERNAL MEDICINE

## 2024-06-26 PROCEDURE — 1126F AMNT PAIN NOTED NONE PRSNT: CPT | Performed by: INTERNAL MEDICINE

## 2024-06-26 PROCEDURE — 1123F ACP DISCUSS/DSCN MKR DOCD: CPT | Performed by: INTERNAL MEDICINE

## 2024-06-26 PROCEDURE — 3077F SYST BP >= 140 MM HG: CPT | Performed by: INTERNAL MEDICINE

## 2024-06-26 ASSESSMENT — PAIN SCALES - GENERAL: PAINLEVEL: 0-NO PAIN

## 2024-06-26 NOTE — PROGRESS NOTES
Oncology Follow up Note     Cancer History  Prostate Cancer - High Risk , Local Prostate  Treatment  -elevated PSA 11.18, MRI 2/2022 - 2.7x2.5 right prostate, EPE, SV invasion, no LAD, 3/2022 - BS - negative, Prostate biopsy 3/22 - multiple cores of Gr Gr 4 , PNI, refused PET PSMA  -ADT / Eligard 3m 4/19/22  - SBRT to prostate and seminal vesicles, treatment Dates: 05/27/2022-06/08/2022  Radiation Dose Prescribed: 3750 cGy in 5 fractions, 750 cGy per fraction  -last ADT 12/23 , completed 2 yrs of therapy lupron 3 m    Provider Team  Berta Burris APRN-CNP   Lea Watson, MD Rico Ahuja - neurologist  Cardiology - Alejandra Craig  ENT - Idania Garcia / nephrology / [facility]   [facility]    Other contributing history  CAD, trigger finger, obesity, HTN, HL, CKD, headaches.migraines, carotid stenosis, GERD, CEA 1/23, Stroke /CVA 12/22, Anemia elevated SFLC , left wrist issues, anemia followed by heme    Interval history:  The patient presents for follow up.  He is accompanied by his wife unfortunately did have a injury to his wrist that he is continue to work up with orthopedics.  Did have some ongoing issues with hot flashes fatigue and has recently had follow-up with hematology and nephrology.  Nephrology started him on new medications for his chronic kidney disease.  He continues on the Neurontin and still having ongoing issues with hot flashes.  They did discuss the potential use of Lyrica.  Notes no worsening urinary symptoms.      ROS:  10-pt ROS reviewed and negative except as mentioned above.    Medications: below was reviewed and is accurate  Current Outpatient Medications   Medication Instructions    aspirin 81 mg, oral, Daily    azelastine (Astelin) 137 mcg (0.1 %) nasal spray 2 sprays, nasal, 2 times daily    bumetanide (BUMEX) 1 mg, oral, As needed    butalbital-acetaminophen-caff -40 mg tablet Take one PO  every 4-6 hours as needed.  Not to exceed 6 tabs per 24 hours    calcium 500 mg calcium (1,250 mg) tablet 1 tablet, oral, 2 times daily (morning and late afternoon)    carboxymethylcellulose (THERATears) 0.25 % ophthalmic solution As needed    cholecalciferol (VITAMIN D-3) 1,000 Units, oral, Daily RT    clopidogrel (Plavix) 75 mg tablet 1 tablet, oral, Daily    cyanocobalamin (VITAMIN B-12) 1,000 mcg, oral, Daily    Farxiga 5 mg, oral, Daily    ferrous fumarate-vitamin C (Trev-Sequeles 65-25) 1 tablet, oral, 3 times daily (morning, midday, late afternoon), Do not crush, chew, or split.    gabapentin (NEURONTIN) 300 mg, oral, 3 times daily    hydrocortisone (Anusol-HC) 2.5 % rectal cream APPLY RECTALLY TWICE DAILY FOR 7 DAYS THEN AS NEEDED    icosapent ethyL (Vascepa) 1 gram capsule TAKE 2 CAPSULES BY MOUTH TWO TIMES A DAY WITH FOOD (SWALLOW WHOLE, DO NOT CHEW, OPEN OR CRUSH)    Livalo 2 mg tablet 1 tablet, oral, Daily    losartan (COZAAR) 25 mg, oral, Daily    multivitamin tablet PRN    pantoprazole (ProtoNix) 20 mg EC tablet 1 tablet, oral, Daily    tamsulosin (FLOMAX) 0.4 mg, oral, Daily        Detailed family history and social history reviewed in detail and updated per epic charting and in detail with the patient    Physical exam:  Vitals:    06/26/24 1124   BP: 154/68   Pulse: 61   Resp: 18   Temp: 36.1 °C (97 °F)   SpO2: 96%   Weight: 90 kg (198 lb 6.6 oz)       GEN: NAD, well-appearing  HEENT: no clear lesions seen  NECK: no clear masses  LYMPH: no palpable adenopathy  SKIN: no concerning new lesions except dressing in left arm / wrapped  LUNGS: CTA  CV: RRR no clear R/G  ABD: Soft, NTND. No rebound or guarding.  EXT:  trace edema bilaterally dressing in place of left arm I think Oz is going to start being within the  NEURO: Grossly intact. No focal deficits.  PSYCH: Normal mood and affect    Radiology review  Reviewed in detail personally and for detail see results in EPIC        Genomics  Data    Laboratory review  Reviewed in detail personally and for detail see results in EPIC  Lab Results   Component Value Date    WBC 7.2 06/20/2024    HGB 11.3 (L) 06/20/2024    HCT 33.9 (L) 06/20/2024    MCV 93 06/20/2024     06/20/2024     Lab Results   Component Value Date    GLUCOSE 109 (H) 06/20/2024    CALCIUM 8.6 06/20/2024     06/20/2024    K 4.5 06/20/2024    CO2 25 06/20/2024     (H) 06/20/2024    BUN 33 (H) 06/20/2024    CREATININE 1.64 (H) 06/20/2024     Lab Results   Component Value Date    PSA 0.02 06/20/2024    PSA 0.02 03/19/2024    PSA 0.01 12/07/2023     Lab Results   Component Value Date    ALT 13 06/20/2024    AST 14 06/20/2024    ALKPHOS 109 06/20/2024    BILITOT 0.3 06/20/2024     Lab Results   Component Value Date    TESTOSTERONE <30 (L) 06/20/2024         ASSESSMENT AND PLAN   Prostate Cancer -PSA continues to remain suppressed still has not had recovery of his testosterone is on Neurontin for his hot flashes.  Discussed in detail other options such as Lyrica to help with his hot flashes would hope recovery of testosterone will lead to improvement of symptoms risk benefits discussed in detail and for now he wants to hold off on aggressive intervention to switching to Lyrica continue on the Neurontin and arrange follow-up in 3 months with labs prior he will continue to contact us for any other new symptoms also discussed the role of follow-up DEXA and wanted to hold for now  Anemia-continue follow-up with hematology  Chronic kidney disease-continue follow-up with nephrology    discussed need while on ADT  maintain adequate cardiovascular health, exercise, dietary modifications,  bone health with calcium 1000 mg with vitamin D 400-800 international units    Denis Ocasio MD  Senior Attending Physician/  in Medicine [facility] [employer]  [facility]  Patient line [phone]  Fax [phone]

## 2024-06-27 ENCOUNTER — TELEPHONE (OUTPATIENT)
Dept: RADIATION ONCOLOGY | Facility: HOSPITAL | Age: 84
End: 2024-06-27
Payer: MEDICARE

## 2024-07-03 ENCOUNTER — APPOINTMENT (OUTPATIENT)
Dept: RADIATION ONCOLOGY | Facility: HOSPITAL | Age: 84
End: 2024-07-03
Payer: MEDICARE

## 2024-07-08 ENCOUNTER — APPOINTMENT (OUTPATIENT)
Dept: ORTHOPEDIC SURGERY | Facility: CLINIC | Age: 84
End: 2024-07-08
Payer: MEDICARE

## 2024-07-08 ENCOUNTER — HOSPITAL ENCOUNTER (OUTPATIENT)
Dept: RADIOLOGY | Facility: CLINIC | Age: 84
Discharge: HOME | End: 2024-07-08
Payer: MEDICARE

## 2024-07-08 VITALS — HEIGHT: 69 IN | WEIGHT: 198 LBS | BODY MASS INDEX: 29.33 KG/M2

## 2024-07-08 DIAGNOSIS — M25.532 LEFT WRIST PAIN: ICD-10-CM

## 2024-07-08 DIAGNOSIS — S62.102A WRIST FRACTURE, CLOSED, LEFT, INITIAL ENCOUNTER: ICD-10-CM

## 2024-07-08 PROCEDURE — L3908 WHO COCK-UP NONMOLDE PRE OTS: HCPCS | Performed by: PHYSICIAN ASSISTANT

## 2024-07-08 PROCEDURE — 1157F ADVNC CARE PLAN IN RCRD: CPT | Performed by: PHYSICIAN ASSISTANT

## 2024-07-08 PROCEDURE — 1036F TOBACCO NON-USER: CPT | Performed by: PHYSICIAN ASSISTANT

## 2024-07-08 PROCEDURE — 1123F ACP DISCUSS/DSCN MKR DOCD: CPT | Performed by: PHYSICIAN ASSISTANT

## 2024-07-08 PROCEDURE — 99213 OFFICE O/P EST LOW 20 MIN: CPT | Performed by: PHYSICIAN ASSISTANT

## 2024-07-08 PROCEDURE — 73110 X-RAY EXAM OF WRIST: CPT | Mod: LEFT SIDE | Performed by: RADIOLOGY

## 2024-07-08 PROCEDURE — 73110 X-RAY EXAM OF WRIST: CPT | Mod: LT

## 2024-07-08 ASSESSMENT — PAIN - FUNCTIONAL ASSESSMENT: PAIN_FUNCTIONAL_ASSESSMENT: NO/DENIES PAIN

## 2024-07-08 NOTE — PROGRESS NOTES
83-year-old right-hand-dominant male presents to clinic today for follow-up of a left wrist injury.  His injury occurred on approximately 6/14/2024 after trying to carry a heavy case of water he tripped landing onto his left outstretched hand.  He was seen in an outside urgent care as well as our Ortho injury clinic.  He has been wearing a plaster splint since then.  He has some mild discomfort of the left wrist.  History of left wrist and thumb arthritis.    Patient's self reported past medical history, medications, allergies, surgical history, family and social history as well as a 10 point review of systems has been documented in the new patient intake form and scanned into the patient's electronic medical record. Pertinent findings are documented in the HPI.    Physical Examination Findings:  Constitutional: Appears well-developed and well-nourished.  Head: Normocephalic and atraumatic.  Eyes: Pupils are equal and round.  Cardiovascular: Intact distal pulses.   Respiratory: Effort normal. No respiratory distress.  Neurologic: Alert and oriented to person, place, and time.  Skin: Skin is warm and dry.  Hematologic / Lymphatic: No lymphedema, lymphangitis.  Psychiatric: normal mood and affect. Behavior is normal.   Musculoskeletal: Left wrist without significant swelling no visible deformity good digital finger range of motion mild tenderness palpation over the distal end of the radius.  Mild diffuse degenerative changes.    Review of outside x-rays of the left wrist taken on 6/14  show possible concern for nondisplaced intra-articular distal radius fracture diffuse degenerative changes.    X-rays taken today of the left wrist reveal no displacement.  Mild increased bridging callus formation over the intra-articular distal radial surface.    Impression: Left distal radius fracture    Plan: We have discussed his imaging in detail today given his symptoms and x-ray findings we have discussed treating this as a  nondisplaced distal radius fracture.  Given the time from the injury and his presentation today as well as x-rays we have discussed transition into a removable brace.  He may come out of this brace for hygiene purposes as well as light range of motion activity.  Will avoid any weightbearing or heavy lifting with the left upper extremity.  We will have him return back to our office in 3 to 4 weeks for repeat x-ray of the left wrist as well as advancement of activities.    Patient was prescribed a cock up wrist brace for distal radius fracture. The patient has weakness, instability and/or deformity of their left wrist which requires stabilization from this orthosis to improve their function.      Verbal and written instructions for the use, wear schedule, cleaning and application of this item were given.  Patient was instructed that should the brace result in increased pain, decreased sensation, increased swelling, or an overall worsening of their medical condition, to please contact our office immediately.     Orthotic management and training was provided for skin care, modifications due to healing tissues, edema changes, interruption in skin integrity, and safety precautions with the orthosis.    Tanisha Jasso PA-C  Department of Orthopaedic Surgery  [facility]    Dictation performed with the use of voice recognition software. Syntax and grammatical errors may exist.

## 2024-07-10 ASSESSMENT — ENCOUNTER SYMPTOMS
ABDOMINAL PAIN: 0
DIARRHEA: 0
ALLERGIC/IMMUNOLOGIC NEGATIVE: 1
RECTAL PAIN: 0
UNEXPECTED WEIGHT CHANGE: 0
HEMATURIA: 0
FREQUENCY: 0
DIFFICULTY URINATING: 0
PALPITATIONS: 0
JOINT SWELLING: 0
CONSTIPATION: 0
ARTHRALGIAS: 0
SHORTNESS OF BREATH: 0
ANAL BLEEDING: 0
PSYCHIATRIC NEGATIVE: 1
FATIGUE: 0
CHEST TIGHTNESS: 0
BACK PAIN: 0
COUGH: 0
DYSURIA: 0
DIZZINESS: 0
FEVER: 0
WEAKNESS: 0
BLOOD IN STOOL: 0

## 2024-07-10 NOTE — PROGRESS NOTES
"Cancer synopsis:  Rad/onc: Diamante ANTHONY  Med/onc: Dr. Ocasio    Prostate Cancer - High Risk , Local Prostate  Treatment  -elevated PSA 11.18, MRI 2/2022 - 2.7x2.5 right prostate, EPE, SV invasion, no LAD, 3/2022 - BS - negative, Prostate biopsy 3/22 - multiple cores of Gr Gr 4 , PNI, refused PET PSMA    -ADT started 4/19/22    - SBRT to prostate and seminal vesicles, treatment Dates: 05/27/2022-06/08/2022  Radiation Dose Prescribed: 3750 cGy in 5 fractions, 750 cGy per fraction    PMH:  Stroke, Gout, Left thryoid nodule, BPH, CKD    Recent imaging:  Chest X-ray 01/2024: normal    Patient ID: [id number]     Dago Han \"Jocelyne" is a 83 y.o. male who presents for his high risk prostate cancer GS 4+4=8, IPSA 11.18 now s/p SBRT 06/08/2022 and completed 2yrs ADT.     RT Site: Prostate and SV  RT Date: 06/08/2022  Hormone therapy: Yes, on lng-term started 04/2022 now completed  Hot Flushes: Admits, to frequent tingling prior to hot flushes. Occurs about every 2 hrs per patient. On 100 gabapentin and manges well per patient  Fatigue: Denies  Bone pain: Denies  ED:  - Quality of erections during the last 4 weeks: 1 = None at all  - Use of erectile dysfunction medications:  None  IPSS: 13  Urinary symptoms: Denies dysuria, hematuria, Does report some frequency associated with hot flushes. Admits to weak stream at times. Reports more then in the past but can be weak at times.  Urinary Medications: Yes, flowmax daily  Pain:   Rectal bleeding: Denies  Colonoscopy: 01/2019, 2 polyps removed next in 5yrs  Other systems: Denies SOB, CP or fever. Does report recent fall with wrist injruy however recovering well per patient.     Review of systems:  Review of Systems   Constitutional:  Negative for fatigue, fever and unexpected weight change.   Respiratory:  Negative for cough, chest tightness and shortness of breath.    Cardiovascular:  Negative for chest pain, palpitations and leg swelling.   Gastrointestinal:  Negative for " abdominal pain, anal bleeding, blood in stool, constipation, diarrhea and rectal pain.   Endocrine: Negative for cold intolerance, heat intolerance and polyuria.   Genitourinary:  Negative for decreased urine volume, difficulty urinating, dysuria, frequency, hematuria and urgency.   Musculoskeletal:  Negative for arthralgias, back pain, gait problem and joint swelling.   Skin: Negative.    Allergic/Immunologic: Negative.    Neurological:  Negative for dizziness, syncope and weakness.   Psychiatric/Behavioral: Negative.         Past Medical history  Past Medical History:   Diagnosis Date    Acute bronchitis due to other specified organisms 09/05/2019    Acute bacterial bronchitis    Acute frontal sinusitis, unspecified 02/28/2018    Acute frontal sinusitis    Allergy status to unspecified drugs, medicaments and biological substances 08/19/2016    History of adverse drug reaction    Anal fistula     Anal fistula    Atherosclerotic heart disease of native coronary artery without angina pectoris 12/22/2022    Atherosclerotic heart disease of native coronary artery without angina pectoris    Bruit of right carotid artery 08/22/2023    Candidiasis of skin and nail 12/06/2017    Yeast dermatitis    Carotid artery obstruction, 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    Carotid stenosis, asymptomatic, right 08/22/2023    Cellulitis of unspecified toe 02/12/2016    Paronychia of toe    Chronic sinusitis, unspecified 09/26/2019    Sinobronchitis    Contact with and (suspected) exposure to unspecified communicable disease 06/21/2017    Exposure to communicable disease    Essential (primary) hypertension 09/12/2022    Essential hypertension    Headache, unspecified 02/27/2018    Chronic daily headache    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    Anemia due to blood loss    Lumbago with sciatica, right side 05/16/2019    Chronic right-sided low back pain with right-sided sciatica    Mixed hyperlipidemia 02/28/2022     Mixed hyperlipidemia    Occlusion and stenosis of bilateral carotid arteries     Carotid artery obstruction, bilateral    Olecranon bursitis, left elbow 12/06/2017    Olecranon bursitis of left elbow    Other abnormalities of gait and mobility 10/15/2019    Imbalance    Other allergic rhinitis 04/26/2016    Perennial allergic rhinitis    Other symptoms and signs involving general sensations and perceptions 10/05/2016    Sensation of pressure in face    Other symptoms and signs involving the musculoskeletal system 08/24/2018    Arm weakness    Pain in right ankle and joints of right foot 03/23/2018    Right ankle pain    Pain in unspecified foot 03/11/2015    Foot pain    Pain in unspecified hand 07/01/2014    Hand pain    Personal history of diseases of the skin and subcutaneous tissue 03/11/2015    History of onychia and paronychia    Personal history of other diseases of male genital organs 09/06/2016    History of acute prostatitis    Personal history of other diseases of male genital organs 03/05/2014    History of prostatitis    Personal history of other diseases of male genital organs 06/28/2016    History of benign prostatic hyperplasia    Personal history of other diseases of the musculoskeletal system and connective tissue 02/27/2018    History of muscle pain    Personal history of other diseases of the nervous system and sense organs 10/17/2013    History of diplopia    Personal history of other diseases of the nervous system and sense organs 11/14/2014    History of carpal tunnel syndrome    Personal history of other diseases of the respiratory system 09/24/2016    History of chronic sinusitis    Personal history of other diseases of the respiratory system 10/09/2014    History of chronic sinusitis    Personal history of other diseases of the respiratory system 03/30/2022    History of acute bacterial sinusitis    Personal history of other diseases of the respiratory system 08/02/2017    History of acute  bacterial sinusitis    Personal history of other diseases of the respiratory system 09/19/2016    History of sinusitis    Personal history of other specified conditions 10/10/2016    History of dizziness    Personal history of other specified conditions 11/06/2014    History of vertigo    Personal history of other specified conditions 03/28/2017    History of urinary hesitancy    Polyp of cecum 01/14/2019    Rectal abscess 11/01/2017    Perirectal cellulitis    Rectal abscess 05/15/2017    Perirectal abscess    Sensorineural hearing loss, bilateral 10/10/2016    Bilateral sensorineural hearing loss    Strain of muscle, fascia and tendon of lower back, initial encounter 11/30/2021    Lumbar strain    Testicular pain, unspecified 04/09/2019    Persistent testicular pain    Unspecified acute conjunctivitis, bilateral 03/28/2017    Conjunctivitis, acute, bilateral    Unspecified otitis externa, unspecified ear 03/12/2015    Otitis externa    Unspecified symptoms and signs involving the genitourinary system 12/21/2017    UTI symptoms        Surgical/family history  Family History   Problem Relation Name Age of Onset    Multiple myeloma Mother      Lung cancer Father      Multiple myeloma Brother      Other (sarcoma) Brother        Past Surgical History:   Procedure Laterality Date    LUMBAR LAMINECTOMY  10/17/2013    Laminectomy Lumbar    MR HEAD ANGIO WO IV CONTRAST  10/29/2013    MR HEAD ANGIO WO IV CONTRAST 10/29/2013 [facility]    MR HEAD ANGIO WO IV CONTRAST  10/18/2016    [place] ANGIO [place] [facility] [place]    MR HEAD ANGIO WO IV CONTRAST  12/14/2022    MR HEAD ANGIO WO IV CONTRAST 12/14/2022 [facility] OFF[facility]GAC[facility]    MR NECK ANGIO WO IV CONTRAST  11/1/2019    MR NECK ANGIO WO IV CONTRAST 11/1/2019 [facility] CLINICAL LEGACY    MR NECK ANGIO WO IV CONTRAST  6/19/2021    MR NECK ANGIO WO IV CONTRAST 6/19/2021 [facility] ANCILLARY LEGACY    MR NECK ANGIO WO IV CONTRAST  11/8/2016    MR NECK ANGIO  WO IV CONTRAST 11/8/2016 [facility] [place] [facility]CILLARY LEGACY    MR NECK ANGIO WO IV CONTRAST  12/14/2022    MR NECK ANGIO WO IV CONTRAST 12/14/2022 DOCTOR OFFICE LEGACY    OTHER SURGICAL HISTORY  10/17/2013    Transurethral Resection Of Prostate    OTHER SURGICAL HISTORY  02/13/2014    Cardiac Cath Procedure Outcome: Successful        Social History  Tobacco Use: Low Risk  (7/11/2024)    Patient History     Smoking Tobacco Use: Never     Smokeless Tobacco Use: Never     Passive Exposure: Never         Current med list:  Current Outpatient Medications   Medication Instructions    aspirin 81 mg, oral, Daily    azelastine (Astelin) 137 mcg (0.1 %) nasal spray 2 sprays, nasal, 2 times daily    bumetanide (BUMEX) 1 mg, oral, As needed    butalbital-acetaminophen-caff -40 mg tablet Take one PO every 4-6 hours as needed.  Not to exceed 6 tabs per 24 hours    calcium 500 mg calcium (1,250 mg) tablet 1 tablet, oral, 2 times daily (morning and late afternoon)    carboxymethylcellulose (THERATears) 0.25 % ophthalmic solution As needed    cholecalciferol (VITAMIN D-3) 1,000 Units, oral, Daily RT    clopidogrel (Plavix) 75 mg tablet 1 tablet, oral, Daily    cyanocobalamin (VITAMIN B-12) 1,000 mcg, oral, Daily    Farxiga 5 mg, oral, Daily    ferrous fumarate-vitamin C (Trev-Sequeles 65-25) 1 tablet, oral, 3 times daily (morning, midday, late afternoon), Do not crush, chew, or split.    gabapentin (NEURONTIN) 300 mg, oral, 3 times daily    hydrocortisone (Anusol-HC) 2.5 % rectal cream APPLY RECTALLY TWICE DAILY FOR 7 DAYS THEN AS NEEDED    icosapent ethyL (Vascepa) 1 gram capsule TAKE 2 CAPSULES BY MOUTH TWO TIMES A DAY WITH FOOD (SWALLOW WHOLE, DO NOT CHEW, OPEN OR CRUSH)    Livalo 2 mg tablet 1 tablet, oral, Daily    losartan (COZAAR) 25 mg, oral, Daily    multivitamin tablet PRN    pantoprazole (ProtoNix) 20 mg EC tablet 1 tablet, oral, Daily    tamsulosin (FLOMAX) 0.4 mg, oral, Daily        Last recorded vital:  /69   Pulse 68    Temp 35.9 °C (96.6 °F) (Skin)   Resp 18   Wt 90.1 kg (198 lb 9.6 oz)   SpO2 96%   BMI 29.33 kg/m²     Physical exam  Physical Exam  Constitutional:       Appearance: Normal appearance.   Cardiovascular:      Rate and Rhythm: Normal rate.   Pulmonary:      Effort: Pulmonary effort is normal.      Breath sounds: Normal breath sounds.   Musculoskeletal:         General: Normal range of motion.      Cervical back: Normal range of motion.   Neurological:      Mental Status: He is alert and oriented to person, place, and time.   Psychiatric:         Mood and Affect: Mood normal.         Behavior: Behavior normal.         Thought Content: Thought content normal.         Judgment: Judgment normal.       Pertinent labs:  Prostate Specific AG   Date/Time Value Ref Range Status   06/20/2024 10:57 AM 0.02 <=4.00 ng/mL Final     Dx:  Problem List Items Addressed This Visit       Prostate cancer (Multi)    Relevant Orders    Clinic Appointment Request Follow Up; ALAN GENAO; [facility] S[facility] (Completed)     Other Visit Diagnoses       Malignant neoplasm of prostate (Multi)    -  Primary        PSA of 0.02 was reviewed and is stable. Testerone <30 and is expected while on hormone therapy. Review of latent SE including rectal bleeding, hematuria, urinary strictures, ED where reviewed as well as how to contact office if s/s present. Denies latent SE. NCCN guidelines where reviewed and routine FUV of every 3m for first year and every 6m for four years for a total of five years was discussed. Patient verbalized understanding.     PLAN:  FUV 6m  Labs per med/onc  Imaging none  Flowmax daily  FUV other providers: PCP for routine evals, med/onc for systemic therapy    Please contact office with any concerns:  Alan Ochoa CNP  [phone]

## 2024-07-11 ENCOUNTER — HOSPITAL ENCOUNTER (OUTPATIENT)
Dept: RADIATION ONCOLOGY | Facility: HOSPITAL | Age: 84
Setting detail: RADIATION/ONCOLOGY SERIES
Discharge: HOME | End: 2024-07-11
Payer: MEDICARE

## 2024-07-11 VITALS
DIASTOLIC BLOOD PRESSURE: 69 MMHG | TEMPERATURE: 96.6 F | WEIGHT: 198.6 LBS | RESPIRATION RATE: 18 BRPM | HEART RATE: 68 BPM | OXYGEN SATURATION: 96 % | SYSTOLIC BLOOD PRESSURE: 128 MMHG | BODY MASS INDEX: 29.33 KG/M2

## 2024-07-11 DIAGNOSIS — C61 PROSTATE CANCER (MULTI): ICD-10-CM

## 2024-07-11 DIAGNOSIS — C61 MALIGNANT NEOPLASM OF PROSTATE (MULTI): Primary | ICD-10-CM

## 2024-07-11 PROCEDURE — 99215 OFFICE O/P EST HI 40 MIN: CPT

## 2024-07-11 ASSESSMENT — ENCOUNTER SYMPTOMS
OCCASIONAL FEELINGS OF UNSTEADINESS: 0
DEPRESSION: 0
LOSS OF SENSATION IN FEET: 0

## 2024-07-11 ASSESSMENT — COLUMBIA-SUICIDE SEVERITY RATING SCALE - C-SSRS
2. HAVE YOU ACTUALLY HAD ANY THOUGHTS OF KILLING YOURSELF?: NO
6. HAVE YOU EVER DONE ANYTHING, STARTED TO DO ANYTHING, OR PREPARED TO DO ANYTHING TO END YOUR LIFE?: NO
1. IN THE PAST MONTH, HAVE YOU WISHED YOU WERE DEAD OR WISHED YOU COULD GO TO SLEEP AND NOT WAKE UP?: NO

## 2024-07-11 ASSESSMENT — PAIN SCALES - GENERAL: PAINLEVEL: 0-NO PAIN

## 2024-07-22 ENCOUNTER — OFFICE VISIT (OUTPATIENT)
Dept: CARDIOLOGY | Facility: CLINIC | Age: 84
End: 2024-07-22
Payer: MEDICARE

## 2024-07-22 VITALS
BODY MASS INDEX: 29.39 KG/M2 | SYSTOLIC BLOOD PRESSURE: 126 MMHG | HEART RATE: 66 BPM | DIASTOLIC BLOOD PRESSURE: 62 MMHG | WEIGHT: 199 LBS

## 2024-07-22 DIAGNOSIS — I10 ESSENTIAL HYPERTENSION: ICD-10-CM

## 2024-07-22 DIAGNOSIS — I50.32 CHRONIC DIASTOLIC HEART FAILURE (MULTI): Primary | ICD-10-CM

## 2024-07-22 DIAGNOSIS — I25.10 CORONARY ARTERY DISEASE INVOLVING NATIVE CORONARY ARTERY OF NATIVE HEART WITHOUT ANGINA PECTORIS: ICD-10-CM

## 2024-07-22 DIAGNOSIS — I63.9 CEREBROVASCULAR ACCIDENT (CVA), UNSPECIFIED MECHANISM (MULTI): ICD-10-CM

## 2024-07-22 DIAGNOSIS — E78.2 MIXED HYPERLIPIDEMIA: ICD-10-CM

## 2024-07-22 PROCEDURE — 1123F ACP DISCUSS/DSCN MKR DOCD: CPT | Performed by: NURSE PRACTITIONER

## 2024-07-22 PROCEDURE — 99214 OFFICE O/P EST MOD 30 MIN: CPT | Performed by: NURSE PRACTITIONER

## 2024-07-22 PROCEDURE — 1036F TOBACCO NON-USER: CPT | Performed by: NURSE PRACTITIONER

## 2024-07-22 PROCEDURE — 3074F SYST BP LT 130 MM HG: CPT | Performed by: NURSE PRACTITIONER

## 2024-07-22 PROCEDURE — 1159F MED LIST DOCD IN RCRD: CPT | Performed by: NURSE PRACTITIONER

## 2024-07-22 PROCEDURE — 3078F DIAST BP <80 MM HG: CPT | Performed by: NURSE PRACTITIONER

## 2024-07-22 PROCEDURE — 1160F RVW MEDS BY RX/DR IN RCRD: CPT | Performed by: NURSE PRACTITIONER

## 2024-07-22 PROCEDURE — 1157F ADVNC CARE PLAN IN RCRD: CPT | Performed by: NURSE PRACTITIONER

## 2024-07-22 NOTE — PROGRESS NOTES
Chief Complaint:   CHF     History Of Present Illness:    Arcadio Han is a 83 y.o. male here for follow-up of heart failure with preserved ejection fraction.   The patient is compliant with regular exercise, daily weights, and follows a low sodium diet. The patient has been well since their last office appointment and is not having any anginal symptoms, dyspnea on exertion, edema, or weight gain.  The patient has not been hospitalized for heart failure.  The patient is presently NYHA functional class I  and is euvolemic.    Weights have been stable at home.   Has not needed to take diuretic.   Cycling without complaint.     Right CEA 1/30/2023 post multiple right hemispheric cerebral infarcts.  SEH (Normal EF, Large LAD ischemia.) - 11/13/2019  Cath (75% mid-LCX, mild LAD) - 11/15/2019  Cath (Severe diag, Moderate LAD, Severe right renal) - 1/31/2014     Allergies:  Other, Lidocaine, and Monosodium glutamate    Review of Systems  All pertinent systems have been reviewed and are negative except for what is stated in the history of present illness.    All other systems have been reviewed and are negative and noncontributory to this patient's current ailments.     Visit Vitals  /62 (BP Location: Right arm, Patient Position: Sitting, BP Cuff Size: Adult)   Pulse 66   Wt 90.3 kg (199 lb)   BMI 29.39 kg/m²   Smoking Status Never   BSA 2.1 m²         Last Labs:  CBC -  Lab Results   Component Value Date    WBC 7.2 06/20/2024    HGB 11.3 (L) 06/20/2024    HCT 33.9 (L) 06/20/2024    MCV 93 06/20/2024     06/20/2024       CMP -  Lab Results   Component Value Date    CALCIUM 8.6 06/20/2024    PHOS 3.6 04/10/2024    PROT 6.4 06/20/2024    ALBUMIN 4.0 06/20/2024    AST 14 06/20/2024    ALT 13 06/20/2024    ALKPHOS 109 06/20/2024    BILITOT 0.3 06/20/2024    BUN 33 (H) 06/20/2024    CREATININE 1.64 (H) 06/20/2024       LIPID PANEL -   Lab Results   Component Value Date    CHOL 127 09/05/2023    TRIG 196 (H)  09/05/2023    HDL 36.4 (A) 09/05/2023    CHHDL 3.5 09/05/2023    LDLF 51 09/05/2023    VLDL 39 09/05/2023    NHDL 106 10/10/2017       RENAL FUNCTION PANEL -   Lab Results   Component Value Date    GLUCOSE 109 (H) 06/20/2024     06/20/2024    K 4.5 06/20/2024     (H) 06/20/2024    CO2 25 06/20/2024    ANIONGAP 12 06/20/2024    BUN 33 (H) 06/20/2024    CREATININE 1.64 (H) 06/20/2024    GFRMALE 37 (A) 07/11/2023    CALCIUM 8.6 06/20/2024    PHOS 3.6 04/10/2024    ALBUMIN 4.0 06/20/2024        Lab Results   Component Value Date     (H) 12/15/2022    HGBA1C 5.4 12/15/2022         Objective   Vitals reviewed.   Constitutional:       Appearance: Healthy appearance. Not in distress.   Neck:      Vascular: No JVR. JVD normal.   Pulmonary:      Effort: Pulmonary effort is normal.      Breath sounds: Normal breath sounds. No wheezing. No rhonchi. No rales.   Chest:      Chest wall: Not tender to palpatation.   Cardiovascular:      PMI at left midclavicular line. Normal rate. Regular rhythm. Normal S1. Normal S2.       Murmurs: There is a grade 1/6 systolic murmur at the URSB.      No gallop.  No click. No rub.   Edema:     Peripheral edema absent.   Abdominal:      General: Bowel sounds are normal.      Palpations: Abdomen is soft.      Tenderness: There is no abdominal tenderness.   Musculoskeletal:         General: No tenderness. Skin:     General: Skin is warm and dry.   Neurological:      General: No focal deficit present.      Mental Status: Alert and oriented to person, place and time.   Psychiatric:         Attention and Perception: Attention normal.         Mood and Affect: Mood normal.       Assessment/Plan   Diagnoses and all orders for this visit:  Chronic diastolic heart failure (CMS/HCC)  - Maintaining euvolemia  - has not needed to bumex  - BP too soft for entresto and beck.   - on 1/2 dose of sgl2; unable to tolerate full   Coronary artery disease involving native coronary artery of native  heart without angina pectoris  - Cath negative for significant dx  - continue statin/asa  Mixed hyperlipidemia  - continue statin  - trigs mildly elevated- on vascepa, otherwise cholesterol well controlled   Essential hypertension  - home bp 120-130s  - well controlled   Cerebrovascular accident (CVA), unspecified mechanism (CMS/HCC)  - on plavix/asa     Follow up 6 months for echo       Current Outpatient Medications:     aspirin 81 mg capsule, Take 81 mg by mouth once daily., Disp: , Rfl:     azelastine (Astelin) 137 mcg (0.1 %) nasal spray, Administer 2 sprays into affected nostril(s) twice a day., Disp: , Rfl:     bumetanide (Bumex) 1 mg tablet, Take 1 tablet (1 mg) by mouth if needed (edema, 3lb weight gain in 24 hours)., Disp: , Rfl:     butalbital-acetaminophen-caff -40 mg tablet, Take one PO every 4-6 hours as needed.  Not to exceed 6 tabs per 24 hours, Disp: 120 tablet, Rfl: 0    calcium 500 mg calcium (1,250 mg) tablet, Take 1 tablet (1,250 mg) by mouth 2 times daily (morning and late afternoon)., Disp: , Rfl:     carboxymethylcellulose (THERATears) 0.25 % ophthalmic solution, if needed for dry eyes., Disp: , Rfl:     cholecalciferol (Vitamin D-3) 25 MCG (1000 UT) capsule, Take 1 capsule (25 mcg) by mouth once daily., Disp: , Rfl:     clopidogrel (Plavix) 75 mg tablet, Take 1 tablet (75 mg) by mouth once daily., Disp: , Rfl:     cyanocobalamin (Vitamin B-12) 1,000 mcg tablet, Take 1 tablet (1,000 mcg) by mouth once daily., Disp: 90 tablet, Rfl: 1    dapagliflozin propanediol (Farxiga) 5 mg, Take 1 tablet (5 mg) by mouth once daily., Disp: 90 tablet, Rfl: 3    ferrous fumarate-vitamin C (Trev-Sequeles 65-25), Take 1 tablet by mouth 3 times daily (morning, midday, late afternoon). Do not crush, chew, or split., Disp: , Rfl:     gabapentin (Neurontin) 300 mg capsule, Take 1 capsule (300 mg) by mouth 3 times a day., Disp: 90 capsule, Rfl: 1    hydrocortisone (Anusol-HC) 2.5 % rectal cream, APPLY  RECTALLY TWICE DAILY FOR 7 DAYS THEN AS NEEDED, Disp: , Rfl:     icosapent ethyL (Vascepa) 1 gram capsule, TAKE 2 CAPSULES BY MOUTH TWO TIMES A DAY WITH FOOD (SWALLOW WHOLE, DO NOT CHEW, OPEN OR CRUSH), Disp: 360 capsule, Rfl: 3    Livalo 2 mg tablet, TAKE ONE TABLET BY MOUTH EVERY DAY, Disp: 90 tablet, Rfl: 2    losartan (Cozaar) 25 mg tablet, Take 1 tablet (25 mg) by mouth once daily., Disp: , Rfl:     multivitamin tablet, PRN, Disp: , Rfl:     pantoprazole (ProtoNix) 20 mg EC tablet, Take 1 tablet (20 mg) by mouth once daily., Disp: , Rfl:     tamsulosin (Flomax) 0.4 mg 24 hr capsule, Take 1 capsule (0.4 mg) by mouth once daily., Disp: , Rfl:     Exclusive of any other services or procedures performed, [place], spent 30 minutes in duration for this visit today.  This time consisted of chart review, obtaining history, and/or performing the exam as documented above, as well as, documenting the clinical information for the encounter in the electronic record, discussing treatment options, plans, and/or goals with patient, family, and/or caregiver, refilling medications, updating the electronic record, ordering medicines, lab work, imaging, referrals, and/or procedures as documented above and communicating with other [facility] professionals. I have discussed the results of laboratory, radiology, and cardiology studies with the patient and their family/caregiver.

## 2024-07-25 DIAGNOSIS — S62.102A WRIST FRACTURE, CLOSED, LEFT, INITIAL ENCOUNTER: Primary | ICD-10-CM

## 2024-07-26 PROCEDURE — RXMED WILLOW AMBULATORY MEDICATION CHARGE

## 2024-07-29 ENCOUNTER — APPOINTMENT (OUTPATIENT)
Dept: ORTHOPEDIC SURGERY | Facility: CLINIC | Age: 84
End: 2024-07-29
Payer: MEDICARE

## 2024-07-29 ENCOUNTER — PHARMACY VISIT (OUTPATIENT)
Dept: PHARMACY | Facility: CLINIC | Age: 84
End: 2024-07-29
Payer: MEDICARE

## 2024-07-29 ENCOUNTER — HOSPITAL ENCOUNTER (OUTPATIENT)
Dept: RADIOLOGY | Facility: CLINIC | Age: 84
Discharge: HOME | End: 2024-07-29
Payer: MEDICARE

## 2024-07-29 VITALS — BODY MASS INDEX: 29.47 KG/M2 | WEIGHT: 199 LBS | HEIGHT: 69 IN

## 2024-07-29 DIAGNOSIS — M25.532 LEFT WRIST PAIN: Primary | ICD-10-CM

## 2024-07-29 DIAGNOSIS — S62.102A WRIST FRACTURE, CLOSED, LEFT, INITIAL ENCOUNTER: ICD-10-CM

## 2024-07-29 PROCEDURE — 1159F MED LIST DOCD IN RCRD: CPT | Performed by: PHYSICIAN ASSISTANT

## 2024-07-29 PROCEDURE — 1123F ACP DISCUSS/DSCN MKR DOCD: CPT | Performed by: PHYSICIAN ASSISTANT

## 2024-07-29 PROCEDURE — 1036F TOBACCO NON-USER: CPT | Performed by: PHYSICIAN ASSISTANT

## 2024-07-29 PROCEDURE — 73110 X-RAY EXAM OF WRIST: CPT | Mod: LT

## 2024-07-29 PROCEDURE — 73110 X-RAY EXAM OF WRIST: CPT | Mod: LEFT SIDE | Performed by: RADIOLOGY

## 2024-07-29 PROCEDURE — 99213 OFFICE O/P EST LOW 20 MIN: CPT | Performed by: PHYSICIAN ASSISTANT

## 2024-07-29 PROCEDURE — 1157F ADVNC CARE PLAN IN RCRD: CPT | Performed by: PHYSICIAN ASSISTANT

## 2024-07-29 ASSESSMENT — PAIN - FUNCTIONAL ASSESSMENT: PAIN_FUNCTIONAL_ASSESSMENT: NO/DENIES PAIN

## 2024-07-29 NOTE — PROGRESS NOTES
Patient returns to clinic today for follow-up of his left wrist concern for possible distal radius fracture that occurred on 6/14/2024.  Overall he is doing very well having some mild discomfort and a lot of stiffness of bilateral hands associated with his underlying arthritis    Patient's self reported past medical history, medications, allergies, surgical history, family and social history as well as a 10 point review of systems has been documented in the new patient intake form and scanned into the patient's electronic medical record. Pertinent findings are documented in the HPI.    Physical Examination Findings:  Constitutional: Appears well-developed and well-nourished.  Head: Normocephalic and atraumatic.  Eyes: Pupils are equal and round.  Cardiovascular: Intact distal pulses.   Respiratory: Effort normal. No respiratory distress.  Neurologic: Alert and oriented to person, place, and time.  Skin: Skin is warm and dry.  Hematologic / Lymphatic: No lymphedema, lymphangitis.  Psychiatric: normal mood and affect. Behavior is normal.   Musculoskeletal: Left wrist without significant swelling no visible deformity good digital finger range of motion.  Mild tenderness palpation over the distal end of the radius.  Good wrist range of motion mild diffuse degenerative changes.    New x-rays taken today reveal continued diffuse degenerative changes, no significant concerns for from possible previous area.    Impression: Left wrist injury    Plan: At this time we discussed allowing him to return back to normal activity as he feels comfortable.  We did discuss that he may have continued symptoms associated with his arthritis.  He does not feel he needs any formal therapy at this time we will continue to advance his activity as tolerated.  May wear the brace on an as-needed basis.  Will return to our office as needed    Tanisha Jasso PA-C  Department of Orthopaedic Surgery  [facility]  [facility]    Dictation performed with the use of voice recognition software. Syntax and grammatical errors may exist.

## 2024-08-08 ENCOUNTER — HOSPITAL ENCOUNTER (OUTPATIENT)
Dept: VASCULAR MEDICINE | Facility: HOSPITAL | Age: 84
Discharge: HOME | End: 2024-08-08
Payer: MEDICARE

## 2024-08-08 DIAGNOSIS — R09.89 OTHER SPECIFIED SYMPTOMS AND SIGNS INVOLVING THE CIRCULATORY AND RESPIRATORY SYSTEMS: ICD-10-CM

## 2024-08-08 DIAGNOSIS — I65.29 OCCLUSION AND STENOSIS OF UNSPECIFIED CAROTID ARTERY: ICD-10-CM

## 2024-08-08 DIAGNOSIS — I65.23 OCCLUSION AND STENOSIS OF BILATERAL CAROTID ARTERIES: ICD-10-CM

## 2024-08-08 PROCEDURE — 93880 EXTRACRANIAL BILAT STUDY: CPT | Performed by: INTERNAL MEDICINE

## 2024-08-08 PROCEDURE — 93880 EXTRACRANIAL BILAT STUDY: CPT

## 2024-08-16 ENCOUNTER — LAB (OUTPATIENT)
Dept: LAB | Facility: LAB | Age: 84
End: 2024-08-16
Payer: MEDICARE

## 2024-08-16 DIAGNOSIS — D64.9 ANEMIA, UNSPECIFIED TYPE: ICD-10-CM

## 2024-08-16 DIAGNOSIS — E53.8 B12 DEFICIENCY: ICD-10-CM

## 2024-08-16 DIAGNOSIS — N18.9 CHRONIC KIDNEY DISEASE, UNSPECIFIED CKD STAGE: ICD-10-CM

## 2024-08-16 LAB
BASOPHILS # BLD AUTO: 0.03 X10*3/UL (ref 0–0.1)
BASOPHILS NFR BLD AUTO: 0.5 %
EOSINOPHIL # BLD AUTO: 0.16 X10*3/UL (ref 0–0.4)
EOSINOPHIL NFR BLD AUTO: 2.6 %
ERYTHROCYTE [DISTWIDTH] IN BLOOD BY AUTOMATED COUNT: 13.5 % (ref 11.5–14.5)
HCT VFR BLD AUTO: 34.9 % (ref 41–52)
HGB BLD-MCNC: 11.6 G/DL (ref 13.5–17.5)
HGB RETIC QN: 34 PG (ref 28–38)
IMM GRANULOCYTES # BLD AUTO: 0.02 X10*3/UL (ref 0–0.5)
IMM GRANULOCYTES NFR BLD AUTO: 0.3 % (ref 0–0.9)
IMMATURE RETIC FRACTION: 15.1 %
LYMPHOCYTES # BLD AUTO: 1.37 X10*3/UL (ref 0.8–3)
LYMPHOCYTES NFR BLD AUTO: 22 %
MCH RBC QN AUTO: 30.8 PG (ref 26–34)
MCHC RBC AUTO-ENTMCNC: 33.2 G/DL (ref 32–36)
MCV RBC AUTO: 93 FL (ref 80–100)
MONOCYTES # BLD AUTO: 0.62 X10*3/UL (ref 0.05–0.8)
MONOCYTES NFR BLD AUTO: 9.9 %
NEUTROPHILS # BLD AUTO: 4.04 X10*3/UL (ref 1.6–5.5)
NEUTROPHILS NFR BLD AUTO: 64.7 %
NRBC BLD-RTO: 0 /100 WBCS (ref 0–0)
PLATELET # BLD AUTO: 176 X10*3/UL (ref 150–450)
RBC # BLD AUTO: 3.77 X10*6/UL (ref 4.5–5.9)
RETICS #: 0.07 X10*6/UL (ref 0.02–0.11)
RETICS/RBC NFR AUTO: 2 % (ref 0.5–2)
VIT B12 SERPL-MCNC: 1320 PG/ML (ref 211–911)
WBC # BLD AUTO: 6.2 X10*3/UL (ref 4.4–11.3)

## 2024-08-16 PROCEDURE — 36415 COLL VENOUS BLD VENIPUNCTURE: CPT

## 2024-08-16 PROCEDURE — 85045 AUTOMATED RETICULOCYTE COUNT: CPT

## 2024-08-16 PROCEDURE — 85025 COMPLETE CBC W/AUTO DIFF WBC: CPT

## 2024-08-16 PROCEDURE — 82607 VITAMIN B-12: CPT

## 2024-08-16 PROCEDURE — 83921 ORGANIC ACID SINGLE QUANT: CPT

## 2024-08-22 ENCOUNTER — OFFICE VISIT (OUTPATIENT)
Dept: HEMATOLOGY/ONCOLOGY | Facility: HOSPITAL | Age: 84
End: 2024-08-22
Payer: MEDICARE

## 2024-08-22 VITALS
RESPIRATION RATE: 20 BRPM | BODY MASS INDEX: 29.2 KG/M2 | HEART RATE: 62 BPM | SYSTOLIC BLOOD PRESSURE: 156 MMHG | WEIGHT: 197.75 LBS | TEMPERATURE: 97 F | DIASTOLIC BLOOD PRESSURE: 63 MMHG | OXYGEN SATURATION: 96 %

## 2024-08-22 DIAGNOSIS — E53.8 B12 DEFICIENCY: ICD-10-CM

## 2024-08-22 DIAGNOSIS — D64.9 ANEMIA, UNSPECIFIED TYPE: ICD-10-CM

## 2024-08-22 DIAGNOSIS — N18.9 CHRONIC KIDNEY DISEASE, UNSPECIFIED CKD STAGE: ICD-10-CM

## 2024-08-22 PROCEDURE — 1126F AMNT PAIN NOTED NONE PRSNT: CPT | Performed by: STUDENT IN AN ORGANIZED HEALTH CARE EDUCATION/TRAINING PROGRAM

## 2024-08-22 PROCEDURE — 1123F ACP DISCUSS/DSCN MKR DOCD: CPT | Performed by: STUDENT IN AN ORGANIZED HEALTH CARE EDUCATION/TRAINING PROGRAM

## 2024-08-22 PROCEDURE — 3078F DIAST BP <80 MM HG: CPT | Performed by: STUDENT IN AN ORGANIZED HEALTH CARE EDUCATION/TRAINING PROGRAM

## 2024-08-22 PROCEDURE — 99214 OFFICE O/P EST MOD 30 MIN: CPT | Performed by: STUDENT IN AN ORGANIZED HEALTH CARE EDUCATION/TRAINING PROGRAM

## 2024-08-22 PROCEDURE — 3077F SYST BP >= 140 MM HG: CPT | Performed by: STUDENT IN AN ORGANIZED HEALTH CARE EDUCATION/TRAINING PROGRAM

## 2024-08-22 PROCEDURE — 1036F TOBACCO NON-USER: CPT | Performed by: STUDENT IN AN ORGANIZED HEALTH CARE EDUCATION/TRAINING PROGRAM

## 2024-08-22 PROCEDURE — 1157F ADVNC CARE PLAN IN RCRD: CPT | Performed by: STUDENT IN AN ORGANIZED HEALTH CARE EDUCATION/TRAINING PROGRAM

## 2024-08-22 PROCEDURE — 1159F MED LIST DOCD IN RCRD: CPT | Performed by: STUDENT IN AN ORGANIZED HEALTH CARE EDUCATION/TRAINING PROGRAM

## 2024-08-22 ASSESSMENT — PAIN SCALES - GENERAL: PAINLEVEL: 0-NO PAIN

## 2024-08-22 NOTE — PROGRESS NOTES
"Patient ID: Dago Han \"Arcadio\" is a 83 y.o. male.  Referring Physician: Issa Jorgensen MD  [id number] [place] [address]  Primary Care Provider: Lea Watson MD  Visit Type: Follow Up  Diagnosis: Anemia      Subjective    HPI  83 y.o. male with a PMH significant for prostate cancer (had local RT and remains on ADT), h/o CVA/TIA s/p endarterectomy on clopidogrel,  HTN controlled, who is followed for anemia.      Has mild anemia Hb 11.5-12.5 since 2016, NCNC, mild thrombocytopenia in the past which has resolved. Pt has no particular complaints. Is taking gabapentin for hot flashes, brain fog, tingling in extremities since starting the ADT. Has had prostate cancer  since last 2yrs. Also has fatigue. Joint pains, has had back surgery x2. PSA has been undetectable.   Pt is a practicing neuro-psychologist.   Age appropriate cancer screening: adequate   FMH: sister has 'sticky platelets', daughter had breast cancer, mother had MM, father ?lung cancer, brothers in their 60s with MM and bladder cancer.   Social history: lives in [facility], semi-retired / neuro psychology, professor at [employer], 3 children, 1 daughter, son and daughter, quit smoking 50 yrs ago (per 1/2023 visit), no ETOH, no RDA.   12/14/2023: pt being seen in follow up for anemia, no particular complaints other than recent ?PNA needing admission while he was traveling and prolonged antibiotics. Appears mostly recovered now.  05/02/24: presents for follow up, has been taking PO iron, adjusting the mode of intake recently but otherwise well tolerated.  08/22/24: Presents for follow-up with wife.  Has not been taking p.o. iron as advised, is still taking his p.o. B12, labs confirmed this.  Generally asymptomatic other than recent fall and fracture of his left wrist.      Review of Systems - Oncology  10 point review of systems negative except as stated in HPI    Objective   Past Surgical History:   Procedure Laterality Date    LUMBAR " LAMINECTOMY  10/17/2013    Laminectomy Lumbar    MR HEAD ANGIO WO IV CONTRAST  10/29/2013    MR HEAD ANGIO WO IV CONTRAST 10/29/2013 [facility] ANCILLARY LEGACY    MR HEAD ANGIO WO IV CONTRAST  10/18/2016    MR HEAD ANGIO WO IV CONTRAST 10/18/2016 [facility]CILLARY LEGAC[facility]    MR HEAD ANGIO WO IV CONTRAST  12/14/2022    MR HEAD ANGIO WO IV CONTRAST 12/14/2022 [facility] OFFICE LEGACY    MR NECK ANGIO WO IV CONTRAST  11/1/2019    MR NECK ANGIO WO IV CONTRAST 11/1/2019 [facility] CLINICAL LEGACY    MR NECK ANGIO WO IV CONTRAST  6/19/2021    MR NECK ANGIO WO IV CONTRAST 6/19/2021 [facility] ANCILLARY LEGACY    MR NECK ANGIO WO IV CONTRAST  11/8/2016    MR NECK ANGIO WO IV CONTRAST 11/8/2016 [facility]CILLARY LEGAC[facility]    MR NECK ANGIO WO IV CONTRAST  12/14/2022    MR NECK ANGIO WO IV CONTRAST 12/14/2022 [facility]CTOR OFFICE LEGACY    OTHER SURGICAL HISTORY  10/17/2013    Transurethral Resection Of Prostate    OTHER SURGICAL HISTORY  02/13/2014    Cardiac Cath Procedure Outcome: Successful     Oncology History    No history exists.       Physical Exam  Vitals reviewed.   Constitutional:       General: He is not in acute distress.     Appearance: Normal appearance. He is not toxic-appearing.   Pulmonary:      Effort: Pulmonary effort is normal.   Neurological:      General: No focal deficit present.      Mental Status: He is alert and oriented to person, place, and time.   Psychiatric:         Mood and Affect: Mood normal.       BSA: 2.09 meters squared  /63 (BP Location: Left arm, Patient Position: Sitting, BP Cuff Size: Adult)   Pulse 62   Temp 36.1 °C (97 °F) (Temporal)   Resp 20   Wt 89.7 kg (197 lb 12 oz)   SpO2 96%   BMI 29.20 kg/m²     Labs:  Lab Results   Component Value Date    WBC 6.2 08/16/2024    NEUTROABS 4.04 08/16/2024    IGABSOL 0.02 08/16/2024    LYMPHSABS 1.37 08/16/2024    MONOSABS 0.62 08/16/2024    EOSABS 0.16 08/16/2024    BASOSABS 0.03 08/16/2024    RBC 3.77 (L) 08/16/2024    MCV 93 08/16/2024    MCHC 33.2 08/16/2024    HGB 11.6  (L) 08/16/2024    HCT 34.9 (L) 08/16/2024     08/16/2024     Lab Results   Component Value Date    RETICCTPCT 2.0 08/16/2024      Lab Results   Component Value Date    CREATININE 1.64 (H) 06/20/2024    BUN 33 (H) 06/20/2024    EGFR 42 (L) 06/20/2024     06/20/2024    K 4.5 06/20/2024     (H) 06/20/2024    CO2 25 06/20/2024     Lab Results   Component Value Date    IRON 87 04/30/2024    TIBC 244 04/30/2024    FERRITIN 123 04/30/2024      Lab Results   Component Value Date    XEGUUPIT24 1,320 (H) 08/16/2024       Assessment/Plan    83 y.o. male with a PMH significant for prostate cancer (had local RT and remains on ADT), h/o CVA/TIA s/p endarterectomy on clopidogrel, HTN controlled, who is followed for anemia.     # Anemia, multifactorial:  chronic mild anemia Hb 11.5-12.5 since 2016, NCNC, mild thrombocytopenia in the past which has resolved. Work up has showed mild persistent NCNC anemia, other cell lines WNL, hypoproliferative retic response. B12 and folate WNL, iron not significantly deficient. Myeloma work up excluding urine studies not showing evidence of a monoclonal process, hemolysis markers are negative. Renal function is progressively worse over last 2 yrs. He was started on PO iron given mild iron deficiency and repeat his labs are normalized, persistent mild anemia after which we able to confirm that his EPO was relatively low and his retake response remained hypoproliferative.  Given the mildly elevated MMA and borderline B12 level [keeping in mind that CKD can elevate the MMA], I had him start p.o. B12 which was resolved to low B12 level, and his hemoglobin is now up to 11.6.  Work up suggests anemia of androgen deprivation, inflammation, renal disease, mild B12 borderline deficiency.  Which appears to have improved on supplementation.  Plan:   -Advised patient to transition to multivitamin containing 100 mcg of B12.  Given the normalization of his B12 level is less likely that he was  found absorbing.  -Will recheck a ferritin and iron panel, start EPO his ferritin will need to be greater than 100.  - consider EPO if his Hb drop persistently below 9 in future with known kidney disease  Follow up: Recommend he follow-up with his PCP, and I can see him back once his hemoglobin drops below 9 to consider EPO supplementation   Labs prior to follow-up: Iron panel     Issa Zelaya MD

## 2024-08-24 LAB — METHYLMALONATE SERPL-SCNC: 0.33 UMOL/L (ref 0–0.4)

## 2024-09-04 ENCOUNTER — OFFICE VISIT (OUTPATIENT)
Dept: VASCULAR SURGERY | Facility: HOSPITAL | Age: 84
End: 2024-09-04
Payer: MEDICARE

## 2024-09-04 VITALS
DIASTOLIC BLOOD PRESSURE: 76 MMHG | HEART RATE: 67 BPM | HEIGHT: 69 IN | SYSTOLIC BLOOD PRESSURE: 161 MMHG | WEIGHT: 200 LBS | BODY MASS INDEX: 29.62 KG/M2

## 2024-09-04 DIAGNOSIS — Z86.73 HISTORY OF CVA (CEREBROVASCULAR ACCIDENT): ICD-10-CM

## 2024-09-04 DIAGNOSIS — I65.23 CAROTID STENOSIS, BILATERAL: Primary | ICD-10-CM

## 2024-09-04 PROCEDURE — 3077F SYST BP >= 140 MM HG: CPT | Performed by: PHYSICIAN ASSISTANT

## 2024-09-04 PROCEDURE — 1157F ADVNC CARE PLAN IN RCRD: CPT | Performed by: PHYSICIAN ASSISTANT

## 2024-09-04 PROCEDURE — 99213 OFFICE O/P EST LOW 20 MIN: CPT | Performed by: PHYSICIAN ASSISTANT

## 2024-09-04 PROCEDURE — 3078F DIAST BP <80 MM HG: CPT | Performed by: PHYSICIAN ASSISTANT

## 2024-09-04 PROCEDURE — 1159F MED LIST DOCD IN RCRD: CPT | Performed by: PHYSICIAN ASSISTANT

## 2024-09-04 PROCEDURE — 1036F TOBACCO NON-USER: CPT | Performed by: PHYSICIAN ASSISTANT

## 2024-09-04 PROCEDURE — 1123F ACP DISCUSS/DSCN MKR DOCD: CPT | Performed by: PHYSICIAN ASSISTANT

## 2024-09-04 ASSESSMENT — ENCOUNTER SYMPTOMS
TROUBLE SWALLOWING: 0
WOUND: 0
WHEEZING: 0
ARTHRALGIAS: 0
HEADACHES: 0
SLEEP DISTURBANCE: 0
CONSTIPATION: 0
FEVER: 0
DIZZINESS: 0
FACIAL ASYMMETRY: 0
SHORTNESS OF BREATH: 0
COLOR CHANGE: 0
DIARRHEA: 0
JOINT SWELLING: 0
NAUSEA: 0
ABDOMINAL PAIN: 0
WEAKNESS: 0
CONFUSION: 0
CHILLS: 0
ADENOPATHY: 0
LIGHT-HEADEDNESS: 0
SORE THROAT: 0
DIFFICULTY URINATING: 0
NUMBNESS: 0
NECK PAIN: 0
VOMITING: 0
PALPITATIONS: 0
SEIZURES: 0
FATIGUE: 0
COUGH: 0
SPEECH DIFFICULTY: 0

## 2024-09-04 NOTE — PROGRESS NOTES
"Subjective   Patient ID: Dago Han \"Arcadio\" is a 83 y.o. male who presents for 1 YEAR FOLLOW UP.  HPI  83 year old male with past medical history of migraines, prostate ca, HTN, HLD, right sided CVA right carotid stenosis s/p R CEA 1/30/2023 presents for annual vascular follow up of carotid stenosis. Has been doing well over this past year. No TIA/CVA symptoms. No visual changes. Has noticed intermittent dizziness when standing from seated position, associated with lower Bps. Cardiology follow up pending. Continues on asa/plavix and statin. Recent Ultrasound with <50% carotid stenosis bilaterally    8/8/2024 Carotid Duplex  CONCLUSIONS:  Right Carotid: Findings are consistent with less than 50% stenosis of the right proximal internal carotid artery. Right external carotid artery appears patent with no evidence of stenosis. The right vertebral artery is patent with antegrade flow. No evidence of hemodynamically significant stenosis in the right subclavian artery.  Left Carotid: Findings are consistent with less than 50% stenosis of the left proximal internal carotid artery. There are elevated velocities in the left ECA that are suggestive of disease. The left vertebral artery is patent with antegrade flow. No evidence of hemodynamically significant stenosis in the left subclavian artery.     Comparison:  Compared with study from 8/14/2023, no significant change.     Imaging & Doppler Findings:  Right Plaque Morph: The distal right common carotid artery demonstrates smooth plaque.  Left Plaque Morph: The proximal left internal carotid artery demonstrates calcified plaque. The mid left common carotid artery demonstrates smooth plaque.      Right                       Left    PSV      EDV               PSV      EDV  79 cm/s            CCA P   107 cm/s  124 cm/s           CCA D   111 cm/s  93 cm/s  21 cm/s   ICA P   108 cm/s 30 cm/s  104 cm/s 32 cm/s   ICA D   103 cm/s 29 cm/s  179 cm/s            ECA    336 " "cm/s  71 cm/s  26 cm/s Vertebral 91 cm/s  18 cm/s      Right                                 Left    PSV    Waveform                       PSV   Waveform  212 cm/s          Subclavian Proximal 82 cm/s                   Right Left  ICA/CCA Ratio  0.8  1.0    1/30/23 R CEA (Dr Shabazz)  12/16/2022 right sided CVA  Review of Systems   Constitutional:  Negative for chills, fatigue and fever.   HENT:  Negative for congestion, sore throat and trouble swallowing.    Eyes:  Negative for visual disturbance.   Respiratory:  Negative for cough, shortness of breath and wheezing.    Cardiovascular:  Negative for chest pain, palpitations and leg swelling.   Gastrointestinal:  Negative for abdominal pain, constipation, diarrhea, nausea and vomiting.   Endocrine: Negative for cold intolerance and heat intolerance.   Genitourinary:  Negative for difficulty urinating.   Musculoskeletal:  Negative for arthralgias, joint swelling and neck pain.   Skin:  Negative for color change and wound.   Neurological:  Negative for dizziness, seizures, syncope, facial asymmetry, speech difficulty, weakness, light-headedness, numbness and headaches.   Hematological:  Negative for adenopathy.   Psychiatric/Behavioral:  Negative for behavioral problems, confusion and sleep disturbance.      Vitals:    09/04/24 1312   BP: 161/76   BP Location: Right arm   Patient Position: Sitting   BP Cuff Size: Large adult   Pulse: 67   Weight: 90.7 kg (200 lb)   Height: 1.753 m (5' 9\")      Objective   Physical Exam  Constitutional:       Appearance: Normal appearance.   HENT:      Head: Normocephalic.      Right Ear: External ear normal.      Left Ear: External ear normal.      Nose: Nose normal.      Mouth/Throat:      Mouth: Mucous membranes are moist.   Eyes:      Extraocular Movements: Extraocular movements intact.   Neck:      Vascular: No carotid bruit.   Cardiovascular:      Rate and Rhythm: Normal rate and regular rhythm.      Pulses: Normal pulses. "   Pulmonary:      Effort: Pulmonary effort is normal. No respiratory distress.      Breath sounds: Normal breath sounds.   Abdominal:      Palpations: Abdomen is soft.      Tenderness: There is no abdominal tenderness.   Musculoskeletal:         General: No swelling or tenderness.      Cervical back: Normal range of motion and neck supple.      Right lower leg: No edema.      Left lower leg: No edema.   Skin:     General: Skin is warm and dry.      Capillary Refill: Capillary refill takes less than 2 seconds.      Findings: No lesion.   Neurological:      General: No focal deficit present.      Mental Status: He is alert and oriented to person, place, and time. Mental status is at baseline.   Psychiatric:         Mood and Affect: Mood normal.         Behavior: Behavior normal.         Thought Content: Thought content normal.         Judgment: Judgment normal.         Assessment/Plan   Problem List Items Addressed This Visit    None  Visit Diagnoses         Codes    Carotid stenosis, bilateral    -  Primary I65.23    Relevant Orders    Vascular US Carotid Artery Duplex Bilateral    History of CVA (cerebrovascular accident)     Z86.73        83 year old male with past medical history of migraines, prostate ca, HTN, HLD, right sided CVA right carotid stenosis s/p R CEA 1/30/2023 presents for annual vascular follow up of carotid stenosis. Has been doing well over this past year. No TIA/CVA symptoms. No visual changes.   -reviewed and discussed recent Carotid duplex ultrasound, <50% stenosis bilaterally  -continue asa, plavix and statin  -encouraged patient to follow up with cardiology regarding recent episodes of orthostatic hypotension for possible medication regimen adjustment  -Repeat carotid duplex and office visit 1 year  -signs and symptoms of TIA/CVA and indications to seek urgent medical attention reviewed with patient and wife  -call the office with questions or concerns         Renee Rodriguez PA-C 09/04/24  2:46 PM

## 2024-09-19 ENCOUNTER — LAB (OUTPATIENT)
Dept: LAB | Facility: LAB | Age: 84
End: 2024-09-19
Payer: MEDICARE

## 2024-09-19 DIAGNOSIS — C61 ADENOCARCINOMA OF PROSTATE (MULTI): ICD-10-CM

## 2024-09-19 LAB
ALBUMIN SERPL BCP-MCNC: 4 G/DL (ref 3.4–5)
ALP SERPL-CCNC: 130 U/L (ref 33–136)
ALT SERPL W P-5'-P-CCNC: 14 U/L (ref 10–52)
ANION GAP SERPL CALC-SCNC: 12 MMOL/L (ref 10–20)
AST SERPL W P-5'-P-CCNC: 15 U/L (ref 9–39)
BASOPHILS # BLD AUTO: 0.04 X10*3/UL (ref 0–0.1)
BASOPHILS NFR BLD AUTO: 0.6 %
BILIRUB SERPL-MCNC: 0.3 MG/DL (ref 0–1.2)
BUN SERPL-MCNC: 31 MG/DL (ref 6–23)
CALCIUM SERPL-MCNC: 8.7 MG/DL (ref 8.6–10.3)
CHLORIDE SERPL-SCNC: 106 MMOL/L (ref 98–107)
CO2 SERPL-SCNC: 28 MMOL/L (ref 21–32)
CREAT SERPL-MCNC: 1.72 MG/DL (ref 0.5–1.3)
EGFRCR SERPLBLD CKD-EPI 2021: 39 ML/MIN/1.73M*2
EOSINOPHIL # BLD AUTO: 0.18 X10*3/UL (ref 0–0.4)
EOSINOPHIL NFR BLD AUTO: 2.5 %
ERYTHROCYTE [DISTWIDTH] IN BLOOD BY AUTOMATED COUNT: 13 % (ref 11.5–14.5)
GLUCOSE SERPL-MCNC: 107 MG/DL (ref 74–99)
HCT VFR BLD AUTO: 34.3 % (ref 41–52)
HGB BLD-MCNC: 11.4 G/DL (ref 13.5–17.5)
IMM GRANULOCYTES # BLD AUTO: 0.03 X10*3/UL (ref 0–0.5)
IMM GRANULOCYTES NFR BLD AUTO: 0.4 % (ref 0–0.9)
LYMPHOCYTES # BLD AUTO: 1.35 X10*3/UL (ref 0.8–3)
LYMPHOCYTES NFR BLD AUTO: 18.7 %
MCH RBC QN AUTO: 30.8 PG (ref 26–34)
MCHC RBC AUTO-ENTMCNC: 33.2 G/DL (ref 32–36)
MCV RBC AUTO: 93 FL (ref 80–100)
MONOCYTES # BLD AUTO: 0.46 X10*3/UL (ref 0.05–0.8)
MONOCYTES NFR BLD AUTO: 6.4 %
NEUTROPHILS # BLD AUTO: 5.16 X10*3/UL (ref 1.6–5.5)
NEUTROPHILS NFR BLD AUTO: 71.4 %
NRBC BLD-RTO: 0 /100 WBCS (ref 0–0)
PLATELET # BLD AUTO: 150 X10*3/UL (ref 150–450)
POTASSIUM SERPL-SCNC: 4.7 MMOL/L (ref 3.5–5.3)
PROT SERPL-MCNC: 6.6 G/DL (ref 6.4–8.2)
PSA SERPL-MCNC: 0.04 NG/ML
RBC # BLD AUTO: 3.7 X10*6/UL (ref 4.5–5.9)
SODIUM SERPL-SCNC: 141 MMOL/L (ref 136–145)
WBC # BLD AUTO: 7.2 X10*3/UL (ref 4.4–11.3)

## 2024-09-19 PROCEDURE — 84403 ASSAY OF TOTAL TESTOSTERONE: CPT

## 2024-09-19 PROCEDURE — 36415 COLL VENOUS BLD VENIPUNCTURE: CPT

## 2024-09-19 PROCEDURE — 85025 COMPLETE CBC W/AUTO DIFF WBC: CPT

## 2024-09-19 PROCEDURE — 84153 ASSAY OF PSA TOTAL: CPT

## 2024-09-19 PROCEDURE — 80053 COMPREHEN METABOLIC PANEL: CPT

## 2024-09-20 LAB — TESTOST SERPL-MCNC: <30 NG/DL (ref 240–1000)

## 2024-09-24 ENCOUNTER — TELEMEDICINE (OUTPATIENT)
Dept: HEMATOLOGY/ONCOLOGY | Facility: CLINIC | Age: 84
End: 2024-09-24
Payer: MEDICARE

## 2024-09-24 DIAGNOSIS — C61 ADENOCARCINOMA OF PROSTATE (MULTI): ICD-10-CM

## 2024-09-24 PROCEDURE — 1123F ACP DISCUSS/DSCN MKR DOCD: CPT | Performed by: INTERNAL MEDICINE

## 2024-09-24 PROCEDURE — 1157F ADVNC CARE PLAN IN RCRD: CPT | Performed by: INTERNAL MEDICINE

## 2024-09-24 PROCEDURE — 99214 OFFICE O/P EST MOD 30 MIN: CPT | Performed by: INTERNAL MEDICINE

## 2024-09-24 PROCEDURE — 99214 OFFICE O/P EST MOD 30 MIN: CPT | Mod: 95 | Performed by: INTERNAL MEDICINE

## 2024-09-24 NOTE — PROGRESS NOTES
Oncology Follow up Note  Phone visit    Cancer History  Prostate Cancer - High Risk , Local Prostate  Treatment  -elevated PSA 11.18, MRI 2/2022 - 2.7x2.5 right prostate, EPE, SV invasion, no LAD, 3/2022 - BS - negative, Prostate biopsy 3/22 - multiple cores of Gr Gr 4 , PNI, refused PET PSMA  -ADT / Eligard 3m 4/19/22  - SBRT to prostate and seminal vesicles, treatment Dates: 05/27/2022-06/08/2022  Radiation Dose Prescribed: 3750 cGy in 5 fractions, 750 cGy per fraction  -last ADT 12/23 , completed 2 yrs of therapy lupron 3 m    Provider Team  No ref. provider found   MD Rico Kat - neurologist  Cardiology - Alejandra Craig  ENT - Idania Garcia / nephrology / [facility]   Issa Zavaleta    Other contributing history  CAD, trigger finger, obesity, HTN, HL, CKD, headaches.migraines, carotid stenosis, GERD, CEA 1/23, Stroke /CVA 12/22, Anemia elevated SFLC , left wrist issues, anemia followed by heme    Interval history:  The patient presents for follow up.  The patient is overall doing well still some issues with his low testosterone state with hot flashes fatigue and with intermittent neuropathy issues no worsening urinary symptoms.  Denies any issues with nausea, vomiting, fevers, chills, easy bruising or bleeding denies any issues with chest pain or chest tightness appetite and energy is otherwise well.    ROS:  10-pt ROS reviewed and negative except as mentioned above.    Medications: below was reviewed and is accurate  Current Outpatient Medications   Medication Instructions    aspirin 81 mg, oral, Daily    azelastine (Astelin) 137 mcg (0.1 %) nasal spray 2 sprays, nasal, 2 times daily    bumetanide (BUMEX) 1 mg, oral, As needed    butalbital-acetaminophen-caff -40 mg tablet Take one PO every 4-6 hours as needed.  Not to exceed 6 tabs per 24 hours    calcium 500 mg calcium (1,250 mg) tablet 1 tablet, oral,  2 times daily (morning and late afternoon)    carboxymethylcellulose (THERATears) 0.25 % ophthalmic solution As needed    cholecalciferol (VITAMIN D-3) 1,000 Units, oral, Daily RT    clopidogrel (Plavix) 75 mg tablet 1 tablet, oral, Daily    cyanocobalamin (VITAMIN B-12) 1,000 mcg, oral, Daily    Farxiga 5 mg, oral, Daily    ferrous fumarate-vitamin C (Trev-Sequeles 65-25) 1 tablet, oral, 3 times daily (morning, midday, late afternoon), Do not crush, chew, or split.    gabapentin (NEURONTIN) 300 mg, oral, 3 times daily    hydrocortisone (Anusol-HC) 2.5 % rectal cream APPLY RECTALLY TWICE DAILY FOR 7 DAYS THEN AS NEEDED    icosapent ethyL (Vascepa) 1 gram capsule TAKE 2 CAPSULES BY MOUTH TWO TIMES A DAY WITH FOOD (SWALLOW WHOLE, DO NOT CHEW, OPEN OR CRUSH)    Livalo 2 mg tablet 1 tablet, oral, Daily    losartan (COZAAR) 25 mg, oral, Daily    multivitamin tablet PRN    pantoprazole (ProtoNix) 20 mg EC tablet 1 tablet, oral, Daily    tamsulosin (FLOMAX) 0.4 mg, oral, Daily        Detailed family history and social history reviewed in detail and updated per epic charting and in detail with the patient    Physical exam:  There were no vitals filed for this visit.  Phone visit     Radiology review  Reviewed in detail personally and for detail see results in EPIC        Genomics Data    Laboratory review  Reviewed in detail personally and for detail see results in [facility]  Lab Results   Component Value Date    WBC 7.2 09/19/2024    HGB 11.4 (L) 09/19/2024    HCT 34.3 (L) 09/19/2024    MCV 93 09/19/2024     09/19/2024     Lab Results   Component Value Date    GLUCOSE 107 (H) 09/19/2024    CALCIUM 8.7 09/19/2024     09/19/2024    K 4.7 09/19/2024    CO2 28 09/19/2024     09/19/2024    BUN 31 (H) 09/19/2024    CREATININE 1.72 (H) 09/19/2024     Lab Results   Component Value Date    PSA 0.04 09/19/2024    PSA 0.02 06/20/2024    PSA 0.02 03/19/2024     Lab Results   Component Value Date    ALT 14 09/19/2024     AST 15 09/19/2024    ALKPHOS 130 09/19/2024    BILITOT 0.3 09/19/2024     Lab Results   Component Value Date    TESTOSTERONE <30 (L) 09/19/2024         ASSESSMENT AND PLAN   Prostate Cancer -still has not had recovery of his testosterone PSA remains suppressed options discussed in detail with the patient the role of follow-up DEXA and wanted to hold for now, he is still concerned about the recovery of his testosterone it may take up to a year for him to recover we will check again and arrange follow-up in 3 months with labs and phone visit very minimal increase in PSA for now we will continue to monitor and he will contact us for any other new complaints.  For hot flashes wants to continue on current regimen.  Anemia-continue follow-up with hematology  Chronic kidney disease-continue follow-up with nephrology    discussed need while on ADT  maintain adequate cardiovascular health, exercise, dietary modifications,  bone health with calcium 1000 mg with vitamin D 400-800 international units    Denis Ocasio MD  Senior Attending Physician/  in Medicine [employer]  [facility]  Patient line [phone]  Fax [phone]

## 2024-10-08 ENCOUNTER — APPOINTMENT (OUTPATIENT)
Dept: PHARMACY | Facility: HOSPITAL | Age: 84
End: 2024-10-08
Payer: MEDICARE

## 2024-10-08 DIAGNOSIS — N18.32 HYPERTENSIVE KIDNEY DISEASE WITH STAGE 3B CHRONIC KIDNEY DISEASE (MULTI): Primary | ICD-10-CM

## 2024-10-08 DIAGNOSIS — I12.9 HYPERTENSIVE KIDNEY DISEASE WITH STAGE 3B CHRONIC KIDNEY DISEASE (MULTI): Primary | ICD-10-CM

## 2024-10-08 NOTE — PROGRESS NOTES
SAMEER LEBLANC was referred to the Clinical Pharmacy Team for restart of sglt2     Referring Provider: Noemi Eli  _______________________________________________________________________  MEDICATION INITIATION ASSESSMENT  HPI: CKD stage 3. last EGFR 39 sCr 1.72  Restarted on farxiga 5mg. So far tolerating ok. Has been having continued issues with hotflashes which exacerbate urinary urgency but improves quickly. We will continue on the 5mg dose for the time being following up with dr eli in 2 weeks.  HTN: elevated at last ov. patient will monitor home bps to bring to next appt  /63  Medications  nebivolol 5 mg 1/2 tablet qd   losartan 25mg qd  glucose: well monitored and controlled     HLD: controlled mixed hld   LDL 78 dec2022  On statin? yes livalo 2mg qd (also on vascepa 1gm 2 caps bid     Medications reviewed for appropriate dosing in setting of CKD  Recommended changes:  - no adjustments needed at this time.        _______________________________________________________________________  PATIENT EDUCATION/DISCUSSION:  - Counseled patient on MOA, expectations, side effects, duration of therapy, contraindications, administration, and monitoring parameters  - Answered all patient questions and concerns  - covered well on patients insurance plan $29.10/month     _______________________________________________________________________  PLAN  1.       Continue farxiga 5mg qd   2.       Prescription sent to [facility] pharmacy for assistance on authorization and copay. Medication will be mailed to patient.    Follow up 1/7@

## 2024-10-21 ENCOUNTER — LAB (OUTPATIENT)
Dept: LAB | Facility: LAB | Age: 84
End: 2024-10-21
Payer: MEDICARE

## 2024-10-21 DIAGNOSIS — N18.32 STAGE 3B CHRONIC KIDNEY DISEASE (MULTI): ICD-10-CM

## 2024-10-21 LAB
25(OH)D3 SERPL-MCNC: 28 NG/ML (ref 30–100)
ALBUMIN SERPL BCP-MCNC: 4.2 G/DL (ref 3.4–5)
ANION GAP SERPL CALC-SCNC: 11 MMOL/L (ref 10–20)
APPEARANCE UR: CLEAR
BILIRUB UR STRIP.AUTO-MCNC: NEGATIVE MG/DL
BUN SERPL-MCNC: 34 MG/DL (ref 6–23)
CALCIUM SERPL-MCNC: 9.1 MG/DL (ref 8.6–10.3)
CHLORIDE SERPL-SCNC: 105 MMOL/L (ref 98–107)
CO2 SERPL-SCNC: 29 MMOL/L (ref 21–32)
COLOR UR: COLORLESS
CREAT SERPL-MCNC: 1.76 MG/DL (ref 0.5–1.3)
CREAT UR-MCNC: 61.2 MG/DL (ref 20–370)
CREAT UR-MCNC: 61.2 MG/DL (ref 20–370)
EGFRCR SERPLBLD CKD-EPI 2021: 38 ML/MIN/1.73M*2
ERYTHROCYTE [DISTWIDTH] IN BLOOD BY AUTOMATED COUNT: 13.4 % (ref 11.5–14.5)
GLUCOSE SERPL-MCNC: 123 MG/DL (ref 74–99)
GLUCOSE UR STRIP.AUTO-MCNC: ABNORMAL MG/DL
HCT VFR BLD AUTO: 35.6 % (ref 41–52)
HGB BLD-MCNC: 11.9 G/DL (ref 13.5–17.5)
KETONES UR STRIP.AUTO-MCNC: NEGATIVE MG/DL
LEUKOCYTE ESTERASE UR QL STRIP.AUTO: NEGATIVE
MCH RBC QN AUTO: 31.4 PG (ref 26–34)
MCHC RBC AUTO-ENTMCNC: 33.4 G/DL (ref 32–36)
MCV RBC AUTO: 94 FL (ref 80–100)
MICROALBUMIN UR-MCNC: 11.3 MG/L
MICROALBUMIN/CREAT UR: 18.5 UG/MG CREAT
NITRITE UR QL STRIP.AUTO: NEGATIVE
NRBC BLD-RTO: 0 /100 WBCS (ref 0–0)
PH UR STRIP.AUTO: 5.5 [PH]
PHOSPHATE SERPL-MCNC: 3.7 MG/DL (ref 2.5–4.9)
PLATELET # BLD AUTO: 171 X10*3/UL (ref 150–450)
POTASSIUM SERPL-SCNC: 4.8 MMOL/L (ref 3.5–5.3)
PROT UR STRIP.AUTO-MCNC: NEGATIVE MG/DL
PROT UR-ACNC: 9 MG/DL (ref 5–25)
PROT/CREAT UR: 0.15 MG/MG CREAT (ref 0–0.17)
RBC # BLD AUTO: 3.79 X10*6/UL (ref 4.5–5.9)
RBC # UR STRIP.AUTO: NEGATIVE /UL
SODIUM SERPL-SCNC: 140 MMOL/L (ref 136–145)
SP GR UR STRIP.AUTO: 1.01
UROBILINOGEN UR STRIP.AUTO-MCNC: NORMAL MG/DL
WBC # BLD AUTO: 5.6 X10*3/UL (ref 4.4–11.3)

## 2024-10-21 PROCEDURE — 82043 UR ALBUMIN QUANTITATIVE: CPT

## 2024-10-21 PROCEDURE — 82306 VITAMIN D 25 HYDROXY: CPT

## 2024-10-21 PROCEDURE — 84156 ASSAY OF PROTEIN URINE: CPT

## 2024-10-21 PROCEDURE — 85027 COMPLETE CBC AUTOMATED: CPT

## 2024-10-21 PROCEDURE — 36415 COLL VENOUS BLD VENIPUNCTURE: CPT

## 2024-10-21 PROCEDURE — 83970 ASSAY OF PARATHORMONE: CPT

## 2024-10-21 PROCEDURE — 80069 RENAL FUNCTION PANEL: CPT

## 2024-10-21 PROCEDURE — 81003 URINALYSIS AUTO W/O SCOPE: CPT

## 2024-10-21 PROCEDURE — 82570 ASSAY OF URINE CREATININE: CPT

## 2024-10-22 ENCOUNTER — APPOINTMENT (OUTPATIENT)
Dept: NEPHROLOGY | Facility: CLINIC | Age: 84
End: 2024-10-22
Payer: MEDICARE

## 2024-10-22 VITALS
TEMPERATURE: 97.2 F | HEART RATE: 61 BPM | BODY MASS INDEX: 28.8 KG/M2 | DIASTOLIC BLOOD PRESSURE: 65 MMHG | SYSTOLIC BLOOD PRESSURE: 160 MMHG | WEIGHT: 195 LBS

## 2024-10-22 DIAGNOSIS — N18.32 STAGE 3B CHRONIC KIDNEY DISEASE (MULTI): ICD-10-CM

## 2024-10-22 DIAGNOSIS — I12.9 HYPERTENSIVE KIDNEY DISEASE WITH STAGE 3B CHRONIC KIDNEY DISEASE (MULTI): Primary | ICD-10-CM

## 2024-10-22 DIAGNOSIS — N18.32 HYPERTENSIVE KIDNEY DISEASE WITH STAGE 3B CHRONIC KIDNEY DISEASE (MULTI): Primary | ICD-10-CM

## 2024-10-22 LAB — PTH-INTACT SERPL-MCNC: 76.9 PG/ML (ref 18.5–88)

## 2024-10-22 PROCEDURE — 1123F ACP DISCUSS/DSCN MKR DOCD: CPT | Performed by: INTERNAL MEDICINE

## 2024-10-22 PROCEDURE — 1159F MED LIST DOCD IN RCRD: CPT | Performed by: INTERNAL MEDICINE

## 2024-10-22 PROCEDURE — 1160F RVW MEDS BY RX/DR IN RCRD: CPT | Performed by: INTERNAL MEDICINE

## 2024-10-22 PROCEDURE — 99213 OFFICE O/P EST LOW 20 MIN: CPT | Performed by: INTERNAL MEDICINE

## 2024-10-22 PROCEDURE — RXMED WILLOW AMBULATORY MEDICATION CHARGE

## 2024-10-22 PROCEDURE — 3078F DIAST BP <80 MM HG: CPT | Performed by: INTERNAL MEDICINE

## 2024-10-22 PROCEDURE — 3077F SYST BP >= 140 MM HG: CPT | Performed by: INTERNAL MEDICINE

## 2024-10-22 PROCEDURE — 1126F AMNT PAIN NOTED NONE PRSNT: CPT | Performed by: INTERNAL MEDICINE

## 2024-10-22 PROCEDURE — 1157F ADVNC CARE PLAN IN RCRD: CPT | Performed by: INTERNAL MEDICINE

## 2024-10-22 RX ORDER — DAPAGLIFLOZIN 10 MG/1
10 TABLET, FILM COATED ORAL DAILY
Qty: 90 TABLET | Refills: 3 | Status: SHIPPED | OUTPATIENT
Start: 2024-10-22 | End: 2025-10-22

## 2024-10-22 ASSESSMENT — PAIN SCALES - GENERAL: PAINLEVEL_OUTOF10: 0-NO PAIN

## 2024-10-22 NOTE — PROGRESS NOTES
Chief Complaint: Follow up CKD    Continued sweating, hot flashes, neuropathy, sinus headache  Denies nausea, vomiting, chest pain, dyspnea  No urinary symptoms    NAD  Sclera AI s inj  MMM, no sores  Deferred secondary to COVID  No edema  No tremor  No rash    CKD stage 3b  HTN not at goal here, home SBPS 120-130s   Proteinuria none    Farxiga increase to 10 mg daily  Increase vitamin D to OTC 2000 Int units daily  Labs and follow up in 6 months

## 2024-10-24 ENCOUNTER — PHARMACY VISIT (OUTPATIENT)
Dept: PHARMACY | Facility: CLINIC | Age: 84
End: 2024-10-24
Payer: MEDICARE

## 2024-11-01 ENCOUNTER — APPOINTMENT (OUTPATIENT)
Dept: NEUROLOGY | Facility: CLINIC | Age: 84
End: 2024-11-01
Payer: MEDICARE

## 2024-11-01 VITALS
HEIGHT: 69 IN | BODY MASS INDEX: 28.8 KG/M2 | SYSTOLIC BLOOD PRESSURE: 135 MMHG | HEART RATE: 66 BPM | DIASTOLIC BLOOD PRESSURE: 76 MMHG

## 2024-11-01 DIAGNOSIS — Z86.73 HISTORY OF STROKE: ICD-10-CM

## 2024-11-01 DIAGNOSIS — G56.01 CARPAL TUNNEL SYNDROME OF RIGHT WRIST: ICD-10-CM

## 2024-11-01 DIAGNOSIS — G43.109 MIGRAINE WITH AURA AND WITHOUT STATUS MIGRAINOSUS, NOT INTRACTABLE: Primary | ICD-10-CM

## 2024-11-01 PROCEDURE — 1157F ADVNC CARE PLAN IN RCRD: CPT | Performed by: PSYCHIATRY & NEUROLOGY

## 2024-11-01 PROCEDURE — 1159F MED LIST DOCD IN RCRD: CPT | Performed by: PSYCHIATRY & NEUROLOGY

## 2024-11-01 PROCEDURE — 1160F RVW MEDS BY RX/DR IN RCRD: CPT | Performed by: PSYCHIATRY & NEUROLOGY

## 2024-11-01 PROCEDURE — 1036F TOBACCO NON-USER: CPT | Performed by: PSYCHIATRY & NEUROLOGY

## 2024-11-01 PROCEDURE — 3078F DIAST BP <80 MM HG: CPT | Performed by: PSYCHIATRY & NEUROLOGY

## 2024-11-01 PROCEDURE — 99214 OFFICE O/P EST MOD 30 MIN: CPT | Performed by: PSYCHIATRY & NEUROLOGY

## 2024-11-01 PROCEDURE — 3075F SYST BP GE 130 - 139MM HG: CPT | Performed by: PSYCHIATRY & NEUROLOGY

## 2024-11-01 PROCEDURE — 1123F ACP DISCUSS/DSCN MKR DOCD: CPT | Performed by: PSYCHIATRY & NEUROLOGY

## 2024-11-13 ENCOUNTER — TELEPHONE (OUTPATIENT)
Dept: CARDIOLOGY | Facility: CLINIC | Age: 84
End: 2024-11-13
Payer: MEDICARE

## 2024-11-13 DIAGNOSIS — E78.2 MIXED HYPERLIPIDEMIA: ICD-10-CM

## 2024-11-13 RX ORDER — PITAVASTATIN CALCIUM 2.09 MG/1
1 TABLET, FILM COATED ORAL DAILY
Qty: 90 TABLET | Refills: 2 | Status: SHIPPED | OUTPATIENT
Start: 2024-11-13

## 2024-11-29 ENCOUNTER — APPOINTMENT (OUTPATIENT)
Dept: HEMATOLOGY/ONCOLOGY | Facility: CLINIC | Age: 84
End: 2024-11-29
Payer: MEDICARE

## 2024-12-04 ENCOUNTER — LAB (OUTPATIENT)
Dept: LAB | Facility: LAB | Age: 84
End: 2024-12-04
Payer: MEDICARE

## 2024-12-04 DIAGNOSIS — C61 ADENOCARCINOMA OF PROSTATE (MULTI): ICD-10-CM

## 2024-12-04 LAB
ALBUMIN SERPL BCP-MCNC: 4.2 G/DL (ref 3.4–5)
ALP SERPL-CCNC: 132 U/L (ref 33–136)
ALT SERPL W P-5'-P-CCNC: 14 U/L (ref 10–52)
ANION GAP SERPL CALC-SCNC: 11 MMOL/L (ref 10–20)
AST SERPL W P-5'-P-CCNC: 14 U/L (ref 9–39)
BASOPHILS # BLD AUTO: 0.02 X10*3/UL (ref 0–0.1)
BASOPHILS NFR BLD AUTO: 0.4 %
BILIRUB SERPL-MCNC: 0.3 MG/DL (ref 0–1.2)
BUN SERPL-MCNC: 32 MG/DL (ref 6–23)
CALCIUM SERPL-MCNC: 8.9 MG/DL (ref 8.6–10.3)
CHLORIDE SERPL-SCNC: 109 MMOL/L (ref 98–107)
CO2 SERPL-SCNC: 26 MMOL/L (ref 21–32)
CREAT SERPL-MCNC: 1.72 MG/DL (ref 0.5–1.3)
EGFRCR SERPLBLD CKD-EPI 2021: 39 ML/MIN/1.73M*2
EOSINOPHIL # BLD AUTO: 0.17 X10*3/UL (ref 0–0.4)
EOSINOPHIL NFR BLD AUTO: 3.3 %
ERYTHROCYTE [DISTWIDTH] IN BLOOD BY AUTOMATED COUNT: 13.5 % (ref 11.5–14.5)
GLUCOSE SERPL-MCNC: 90 MG/DL (ref 74–99)
HCT VFR BLD AUTO: 35.2 % (ref 41–52)
HGB BLD-MCNC: 11.3 G/DL (ref 13.5–17.5)
IMM GRANULOCYTES # BLD AUTO: 0.02 X10*3/UL (ref 0–0.5)
IMM GRANULOCYTES NFR BLD AUTO: 0.4 % (ref 0–0.9)
LYMPHOCYTES # BLD AUTO: 1.13 X10*3/UL (ref 0.8–3)
LYMPHOCYTES NFR BLD AUTO: 21.9 %
MCH RBC QN AUTO: 30.5 PG (ref 26–34)
MCHC RBC AUTO-ENTMCNC: 32.1 G/DL (ref 32–36)
MCV RBC AUTO: 95 FL (ref 80–100)
MONOCYTES # BLD AUTO: 0.42 X10*3/UL (ref 0.05–0.8)
MONOCYTES NFR BLD AUTO: 8.1 %
NEUTROPHILS # BLD AUTO: 3.4 X10*3/UL (ref 1.6–5.5)
NEUTROPHILS NFR BLD AUTO: 65.9 %
NRBC BLD-RTO: 0 /100 WBCS (ref 0–0)
PLATELET # BLD AUTO: 151 X10*3/UL (ref 150–450)
POTASSIUM SERPL-SCNC: 4.4 MMOL/L (ref 3.5–5.3)
PROT SERPL-MCNC: 6.7 G/DL (ref 6.4–8.2)
PSA SERPL-MCNC: 0.09 NG/ML
RBC # BLD AUTO: 3.71 X10*6/UL (ref 4.5–5.9)
SODIUM SERPL-SCNC: 142 MMOL/L (ref 136–145)
WBC # BLD AUTO: 5.2 X10*3/UL (ref 4.4–11.3)

## 2024-12-04 PROCEDURE — 85025 COMPLETE CBC W/AUTO DIFF WBC: CPT

## 2024-12-04 PROCEDURE — 84403 ASSAY OF TOTAL TESTOSTERONE: CPT

## 2024-12-04 PROCEDURE — 36415 COLL VENOUS BLD VENIPUNCTURE: CPT

## 2024-12-04 PROCEDURE — 84153 ASSAY OF PSA TOTAL: CPT

## 2024-12-04 PROCEDURE — 80053 COMPREHEN METABOLIC PANEL: CPT

## 2024-12-05 LAB — TESTOST SERPL-MCNC: <30 NG/DL (ref 240–1000)

## 2024-12-09 NOTE — PROGRESS NOTES
"Patient ID: Arcadio Han is a 84 y.o. male.     Oncology Follow up Note  Phone visit: Verbal consent was requested and obtained from patient for a telehealth visit.      Cancer History  Prostate Cancer - High Risk , Local Prostate  Treatment  -elevated PSA 11.18, MRI 2/2022 - 2.7x2.5 right prostate, EPE, SV invasion, no LAD, 3/2022 - BS - negative, Prostate biopsy 3/22 - multiple cores of Gr Gr 4 , PNI, refused PET PSMA  -ADT / Eligard  4/19/22  - SBRT to prostate and seminal vesicles, treatment Dates: 05/27/2022-06/08/2022  Radiation Dose Prescribed: 3750 cGy in 5 fractions, 750 cGy per fraction  -last ADT 12/23 , completed 2 yrs of therapy lupron 3 m     Provider Team  No ref. provider found   MD Rico Kat - neurologist  Cardiology - Alejandra Craig  ENT - Idania Garcia / nephrology / [facility]   Issa Zavaleta     Other contributing history  CAD, trigger finger, obesity, HTN, HL, CKD, headaches.migraines, carotid stenosis, GERD, CEA 1/23, Stroke /CVA 12/22, Anemia elevated SFLC , left wrist issues, anemia followed by heme    Subjective    HPI  Seen in follow up for his high risk prostate cancer. He is off therapy and is being monitored.     Some fatigue.   Denies chest pain/pressure.   Engaged in PT to work on leg strength and exercises.   Appetite is fine \"too good\".   +sinus infection, saw family doctor and has check up tomorrow.   Denies n/v.   Bowels pretty regular. A little constipation at times. Resolves with drinking water. Denies bloody or black Bm's  +hot flashes, significant.   +ha/migraines, denies unusual or severe HA's.   Urinating okay. Denies dysuria.   Denies pain that is new or unusual.     Objective    BSA: There is no height or weight on file to calculate BSA.  There were no vitals taken for this visit.     Physical Exam    Performance Status:  Symptomatic; fully ambulatory    Lab Results "   Component Value Date    PSA 0.09 12/04/2024    PSA 0.04 09/19/2024    PSA 0.02 06/20/2024     Lab Results   Component Value Date    WBC 5.2 12/04/2024    HGB 11.3 (L) 12/04/2024    HCT 35.2 (L) 12/04/2024    MCV 95 12/04/2024     12/04/2024     Lab Results   Component Value Date    GLUCOSE 90 12/04/2024    CALCIUM 8.9 12/04/2024     12/04/2024    K 4.4 12/04/2024    CO2 26 12/04/2024     (H) 12/04/2024    BUN 32 (H) 12/04/2024    CREATININE 1.72 (H) 12/04/2024     Lab Results   Component Value Date    TESTOSTERONE <30 (L) 12/04/2024     Lab Results   Component Value Date    ALT 14 12/04/2024    AST 14 12/04/2024    ALKPHOS 132 12/04/2024    BILITOT 0.3 12/04/2024      Assessment/Plan   High risk prostate cancer s/p RT and ADT.   Increase in PSA, but still low. T<30  Monitor and follow up in 3 mos with labs. Patient prefers phone visit. Will get labs prior.   Referral to integrative medicine for his hot flashes. He is on Neurontin tid. He does not wish to pursue T replacement given his prostate cancer/risk. He would consider acupuncture.     Re: anemia, stable. Last saw hematology, Dr. Zelaya in August. Follow up is now prn if hgb <9 would consider epo.     He has follow up with Rad Onc, cardiology and nephrology.     He noted HA/migraines, but denies any severe or unusual HA's. He knows he should go to the ER with severe/unusual HA given his history of stroke.     Our phone visit lasted just over 11 minutes.       Diagnoses and all orders for this visit:  Hot flashes  -     Referral to [facility]; Future  Adenocarcinoma of prostate (Multi)  -     Clinic Appointment Request ERIKA MIRANDA (phone visit)  -     Clinic Appointment Request Other (comment) (PHONE); CHRIS ZHANG; Future  -     CBC and Auto Differential; Future  -     Comprehensive Metabolic Panel; Future  -     Prostate Specific Antigen; Future  -     Testosterone; Future  -     Referral to [facility];  Future           Berta Burris, APRN-CNP

## 2024-12-10 ENCOUNTER — TELEMEDICINE (OUTPATIENT)
Dept: HEMATOLOGY/ONCOLOGY | Facility: CLINIC | Age: 84
End: 2024-12-10
Payer: MEDICARE

## 2024-12-10 DIAGNOSIS — R23.2 HOT FLASHES: Primary | ICD-10-CM

## 2024-12-10 DIAGNOSIS — C61 ADENOCARCINOMA OF PROSTATE (MULTI): ICD-10-CM

## 2024-12-10 PROCEDURE — 1123F ACP DISCUSS/DSCN MKR DOCD: CPT | Performed by: NURSE PRACTITIONER

## 2024-12-10 PROCEDURE — 1157F ADVNC CARE PLAN IN RCRD: CPT | Performed by: NURSE PRACTITIONER

## 2024-12-10 PROCEDURE — 99212 OFFICE O/P EST SF 10 MIN: CPT | Performed by: NURSE PRACTITIONER

## 2025-01-07 ENCOUNTER — APPOINTMENT (OUTPATIENT)
Dept: PHARMACY | Facility: HOSPITAL | Age: 85
End: 2025-01-07
Payer: MEDICARE

## 2025-01-07 DIAGNOSIS — N18.32 HYPERTENSIVE KIDNEY DISEASE WITH STAGE 3B CHRONIC KIDNEY DISEASE (MULTI): ICD-10-CM

## 2025-01-07 DIAGNOSIS — I12.9 HYPERTENSIVE KIDNEY DISEASE WITH STAGE 3B CHRONIC KIDNEY DISEASE (MULTI): ICD-10-CM

## 2025-01-07 NOTE — PROGRESS NOTES
SAMEER LEBLANC was referred to the Clinical Pharmacy Team for restart of sglt2     Referring Provider: Noemi Eli  _______________________________________________________________________  MEDICATION INITIATION ASSESSMENT  HPI: CKD stage 3. last EGFR 39 sCr 1.72  Increased 10mg every day at last appointment with dr eli. At this time no adjustments needed.    HTN: elevated at last ov. Home readings in the 120s-130s  /76  Medications  nebivolol 5 mg 1/2 tablet qd  losartan 25mg every day    glucose: well monitored and controlled     HLD: controlled mixed hld   LDL 78 dec2022  On statin? yes livalo 2mg qd (also on vascepa 1gm 2 caps bid     Medications reviewed for appropriate dosing in setting of CKD  Recommended changes:  - no adjustments needed at this time.        _______________________________________________________________________  PATIENT EDUCATION/DISCUSSION:  - Counseled patient on MOA, expectations, side effects, duration of therapy, contraindications, administration, and monitoring parameters  - Answered all patient questions and concerns  - covered well on patients insurance plan $29.10/month     _______________________________________________________________________  PLAN  1.       Continue farxiga 10mg qd   2.       Prescription sent to [facility] pharmacy for assistance on authorization and copay. Medication will be mailed to patient.

## 2025-01-08 ENCOUNTER — TELEPHONE (OUTPATIENT)
Dept: RADIATION ONCOLOGY | Facility: HOSPITAL | Age: 85
End: 2025-01-08
Payer: MEDICARE

## 2025-01-08 ASSESSMENT — ENCOUNTER SYMPTOMS
FEVER: 0
ARTHRALGIAS: 0
UNEXPECTED WEIGHT CHANGE: 0
ANAL BLEEDING: 0
ALLERGIC/IMMUNOLOGIC NEGATIVE: 1
FATIGUE: 0
FREQUENCY: 0
DYSURIA: 0
HEMATURIA: 0
COUGH: 0
PALPITATIONS: 0
BLOOD IN STOOL: 0
ABDOMINAL PAIN: 0
WEAKNESS: 0
JOINT SWELLING: 0
SHORTNESS OF BREATH: 0
CONSTIPATION: 0
DIFFICULTY URINATING: 0
DIZZINESS: 0
CHEST TIGHTNESS: 0
BACK PAIN: 0
RECTAL PAIN: 0
DIARRHEA: 0
PSYCHIATRIC NEGATIVE: 1

## 2025-01-08 NOTE — PROGRESS NOTES
"Cancer synopsis:  Rad/onc: Diamante WILLAMS  Med/onc: Dr. Ocasio    Prostate Cancer - High Risk , Local Prostate  Treatment  -elevated PSA 11.18, MRI 2/2022 - 2.7x2.5 right prostate, EPE, SV invasion, no LAD, 3/2022 - BS - negative, Prostate biopsy 3/22 - multiple cores of Gr Gr 4 , PNI, refused PET PSMA    -ADT started 4/19/22    - SBRT to prostate and seminal vesicles, treatment Dates: 05/27/2022-06/08/2022  Radiation Dose Prescribed: 3750 cGy in 5 fractions, 750 cGy per fraction    PMH:  Stroke, Gout, Left thryoid nodule, BPH, CKD    Recent imaging:  Chest X-ray 01/2024: normal    Patient ID: [id number]     Dago Han \"Jocelyne" is a 84 y.o. male who presents for his high risk prostate cancer GS 4+4=8, IPSA 11.18 now s/p SBRT 06/08/2022 and completed 2yrs ADT.     RT Site: Prostate and SV  RT Date: 06/08/2022  Hormone therapy: Yes, on lng-term started 04/2022 now completed  Hot Flushes: Admits, to frequent tingling prior to hot flushes. Occurs about every 2 hrs per patient. On 100 gabapentin and manges well per patient  Fatigue: Denies  Bone pain: Denies  ED:  - Quality of erections during the last 4 weeks: 1 = None at all  - Use of erectile dysfunction medications:  None  IPSS: Virtual  Urinary symptoms: Denies dysuria, hematuria, Does report some frequency associated with hot flushes. Admits to weak stream at times. Reports more then in the past but can be weak at times however not bothersome.  Urinary Medications: Yes, flowmax daily  Pain: Denies  Rectal bleeding: Denies  Colonoscopy: 01/2019, 2 polyps removed next in 5yrs  Other systems: Denies SOB, CP or fever. Recovering from COVID. Reports his symptoms are moderate and is using inhaler periodically for coughing.    Review of systems:  Review of Systems   Constitutional:  Negative for fatigue, fever and unexpected weight change.   Respiratory:  Negative for cough, chest tightness and shortness of breath.    Cardiovascular:  Negative for chest pain, " palpitations and leg swelling.   Gastrointestinal:  Negative for abdominal pain, anal bleeding, blood in stool, constipation, diarrhea and rectal pain.   Endocrine: Negative for cold intolerance, heat intolerance and polyuria.   Genitourinary:  Negative for decreased urine volume, difficulty urinating, dysuria, frequency, hematuria and urgency.   Musculoskeletal:  Negative for arthralgias, back pain, gait problem and joint swelling.   Skin: Negative.    Allergic/Immunologic: Negative.    Neurological:  Negative for dizziness, syncope and weakness.   Psychiatric/Behavioral: Negative.       Past Medical history  Past Medical History:   Diagnosis Date    Acute bronchitis due to other specified organisms 09/05/2019    Acute bacterial bronchitis    Acute frontal sinusitis, unspecified 02/28/2018    Acute frontal sinusitis    Allergy status to unspecified drugs, medicaments and biological substances 08/19/2016    History of adverse drug reaction    Anal fistula     Anal fistula    Atherosclerotic heart disease of native coronary artery without angina pectoris 12/22/2022    Atherosclerotic heart disease of native coronary artery without angina pectoris    Bruit of right carotid artery 08/22/2023    Candidiasis of skin and nail 12/06/2017    Yeast dermatitis    Carotid artery obstruction, 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    Carotid stenosis, asymptomatic, right 08/22/2023    Cellulitis of unspecified toe 02/12/2016    Paronychia of toe    Chronic sinusitis, unspecified 09/26/2019    Sinobronchitis    Contact with and (suspected) exposure to unspecified communicable disease 06/21/2017    Exposure to communicable disease    Essential (primary) hypertension 09/12/2022    Essential hypertension    Headache, unspecified 02/27/2018    Chronic daily headache    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    Anemia due to blood loss    Lumbago with sciatica, right side 05/16/2019    Chronic right-sided low back pain  with right-sided sciatica    Mixed hyperlipidemia 02/28/2022    Mixed hyperlipidemia    Occlusion and stenosis of bilateral carotid arteries     Carotid artery obstruction, bilateral    Olecranon bursitis, left elbow 12/06/2017    Olecranon bursitis of left elbow    Other abnormalities of gait and mobility 10/15/2019    Imbalance    Other allergic rhinitis 04/26/2016    Perennial allergic rhinitis    Other symptoms and signs involving general sensations and perceptions 10/05/2016    Sensation of pressure in face    Other symptoms and signs involving the musculoskeletal system 08/24/2018    Arm weakness    Pain in right ankle and joints of right foot 03/23/2018    Right ankle pain    Pain in unspecified foot 03/11/2015    Foot pain    Pain in unspecified hand 07/01/2014    Hand pain    Personal history of diseases of the skin and subcutaneous tissue 03/11/2015    History of onychia and paronychia    Personal history of other diseases of male genital organs 09/06/2016    History of acute prostatitis    Personal history of other diseases of male genital organs 03/05/2014    History of prostatitis    Personal history of other diseases of male genital organs 06/28/2016    History of benign prostatic hyperplasia    Personal history of other diseases of the musculoskeletal system and connective tissue 02/27/2018    History of muscle pain    Personal history of other diseases of the nervous system and sense organs 10/17/2013    History of diplopia    Personal history of other diseases of the nervous system and sense organs 11/14/2014    History of carpal tunnel syndrome    Personal history of other diseases of the respiratory system 09/24/2016    History of chronic sinusitis    Personal history of other diseases of the respiratory system 10/09/2014    History of chronic sinusitis    Personal history of other diseases of the respiratory system 03/30/2022    History of acute bacterial sinusitis    Personal history of other  diseases of the respiratory system 08/02/2017    History of acute bacterial sinusitis    Personal history of other diseases of the respiratory system 09/19/2016    History of sinusitis    Personal history of other specified conditions 10/10/2016    History of dizziness    Personal history of other specified conditions 11/06/2014    History of vertigo    Personal history of other specified conditions 03/28/2017    History of urinary hesitancy    Polyp of cecum 01/14/2019    Rectal abscess 11/01/2017    Perirectal cellulitis    Rectal abscess 05/15/2017    Perirectal abscess    Sensorineural hearing loss, bilateral 10/10/2016    Bilateral sensorineural hearing loss    Strain of muscle, fascia and tendon of lower back, initial encounter 11/30/2021    Lumbar strain    Testicular pain, unspecified 04/09/2019    Persistent testicular pain    Unspecified acute conjunctivitis, bilateral 03/28/2017    Conjunctivitis, acute, bilateral    Unspecified otitis externa, unspecified ear 03/12/2015    Otitis externa    Unspecified symptoms and signs involving the genitourinary system 12/21/2017    UTI symptoms      Surgical/family history  Family History   Problem Relation Name Age of Onset    Multiple myeloma Mother      Lung cancer Father      Multiple myeloma Brother      Other (sarcoma) Brother        Past Surgical History:   Procedure Laterality Date    LUMBAR LAMINECTOMY  10/17/2013    Laminectomy Lumbar    MR HEAD ANGIO WO IV CONTRAST  10/29/2013    MR HEAD ANGIO WO IV CONTRAST 10/29/2013 [facility] ANCILLARY LEGACY    MR HEAD ANGIO WO IV CONTRAST  10/18/2016    MR HEAD ANGIO WO IV CONTRAST 10/18/2016 [facility]    MR HEAD ANGIO WO IV CONTRAST  12/14/2022    MR HEAD ANGIO WO IV CONTRAST 12/14/2022 DOCTOR OFFICE LEGACY    MR NECK ANGIO WO IV CONTRAST  11/1/2019    MR NECK ANGIO WO IV CONTRAST 11/1/2019 [facility] CLINICAL LEGACY    MR NECK ANGIO WO IV CONTRAST  6/19/2021    MR NECK ANGIO WO IV CONTRAST 6/19/2021 [facility] ANCILLARY  LEGACY    MR NECK ANGIO WO IV CONTRAST  11/8/2016    MR NECK ANGIO WO IV CONTRAST 11/8/2016 [facility] ANCILLARY LEGACY    MR NECK ANGIO WO IV CONTRAST  12/14/2022    MR NECK ANGIO WO IV CONTRAST 12/14/2022 DOCTOR OFFICE LEGACY    OTHER SURGICAL HISTORY  10/17/2013    Transurethral Resection Of Prostate    OTHER SURGICAL HISTORY  02/13/2014    Cardiac Cath Procedure Outcome: Successful      Social History  Tobacco Use: Low Risk  (11/1/2024)    Patient History     Smoking Tobacco Use: Never     Smokeless Tobacco Use: Never     Passive Exposure: Never   Recent Concern: Tobacco Use - Medium Risk (9/11/2024)    Received from [facility]    Patient History     Smoking Tobacco Use: Former     Smokeless Tobacco Use: Unknown     Passive Exposure: Not on file       Current med list:  Current Outpatient Medications   Medication Instructions    aspirin 81 mg, Daily    azelastine (Astelin) 137 mcg (0.1 %) nasal spray 2 sprays, 2 times daily    bumetanide (BUMEX) 1 mg, As needed    butalbital-acetaminophen-caff -40 mg tablet Take one PO every 4-6 hours as needed.  Not to exceed 6 tabs per 24 hours    calcium 500 mg calcium (1,250 mg) tablet 1 tablet, 2 times daily (morning and late afternoon)    cholecalciferol (VITAMIN D-3) 1,000 Units, Daily RT    clopidogrel (Plavix) 75 mg tablet 1 tablet, Daily    Farxiga 10 mg, oral, Daily    gabapentin (NEURONTIN) 300 mg, oral, 3 times daily    hydrocortisone (Anusol-HC) 2.5 % rectal cream APPLY RECTALLY TWICE DAILY FOR 7 DAYS THEN AS NEEDED    icosapent ethyL (Vascepa) 1 gram capsule TAKE 2 CAPSULES BY MOUTH TWO TIMES A DAY WITH FOOD (SWALLOW WHOLE, DO NOT CHEW, OPEN OR CRUSH)    losartan (COZAAR) 25 mg, Daily    multivitamin tablet PRN    pantoprazole (ProtoNix) 20 mg EC tablet 1 tablet, Daily    pitavastatin calcium (Livalo) 2 mg tablet 1 tablet, oral, Daily    tamsulosin (FLOMAX) 0.4 mg, Daily      Last recorded vital:  virtual    Physical exam  Virtual    Pertinent labs:  Prostate  Specific AG   Date/Time Value Ref Range Status   12/04/2024 11:57 AM 0.09 <=4.00 ng/mL Final     Dx:  Problem List Items Addressed This Visit    None  Visit Diagnoses       Malignant neoplasm of prostate (Multi)        Relevant Orders    Clinic Appointment Request Follow Up; ALAN GENAO (virtual); [facility] (virtual) (Completed)        PSA of 0.09 was reviewed and is stable. Testerone <30 and is expected while testosterone recovers. Review of latent SE including rectal bleeding, hematuria, urinary strictures, ED where reviewed as well as how to contact office if s/s present. Denies latent SE. Given FUV with med/onc and now 2yrs since RT with low risk of latent RT SE will move to PRN. Further survivorship with med/onc.    Recommend vitamin D supplementation, start (or increase by) 400-1000 units daily. Reviewed importance of intake while on Testerone lowering therapy as well as after until Testerone re-establishes, at which point may stop if DEXA indicative of normal bone density. Also reviewed that individuals at a higher risk such as those over the age of 75 or those with a hx of bone fx, osteoporosis or osteopenia to continue supplementation indefinitely with routine DEXA imaging as per national guidelines to assess bone health continually.     PLAN:  FUV PRN  Labs per med/onc  Imaging none  Flowmax daily  FUV other providers: PCP for routine evals, med/onc for systemic therapy    Please contact office with any concerns:  Alan Ochoa CNP  [phone]

## 2025-01-10 ENCOUNTER — HOSPITAL ENCOUNTER (OUTPATIENT)
Dept: RADIATION ONCOLOGY | Facility: HOSPITAL | Age: 85
Setting detail: RADIATION/ONCOLOGY SERIES
Discharge: HOME | End: 2025-01-10
Payer: MEDICARE

## 2025-01-10 DIAGNOSIS — C61 MALIGNANT NEOPLASM OF PROSTATE (MULTI): ICD-10-CM

## 2025-01-10 PROCEDURE — 99213 OFFICE O/P EST LOW 20 MIN: CPT

## 2025-01-10 PROCEDURE — 99213 OFFICE O/P EST LOW 20 MIN: CPT | Mod: 95

## 2025-01-27 ENCOUNTER — APPOINTMENT (OUTPATIENT)
Dept: CARDIOLOGY | Facility: CLINIC | Age: 85
End: 2025-01-27
Payer: MEDICARE

## 2025-01-27 PROCEDURE — RXMED WILLOW AMBULATORY MEDICATION CHARGE

## 2025-01-28 ENCOUNTER — PHARMACY VISIT (OUTPATIENT)
Dept: PHARMACY | Facility: CLINIC | Age: 85
End: 2025-01-28
Payer: MEDICARE

## 2025-03-03 ENCOUNTER — HOSPITAL ENCOUNTER (OUTPATIENT)
Dept: CARDIOLOGY | Facility: CLINIC | Age: 85
Discharge: HOME | End: 2025-03-03
Payer: MEDICARE

## 2025-03-03 ENCOUNTER — APPOINTMENT (OUTPATIENT)
Dept: CARDIOLOGY | Facility: CLINIC | Age: 85
End: 2025-03-03
Payer: MEDICARE

## 2025-03-03 VITALS
HEIGHT: 70 IN | DIASTOLIC BLOOD PRESSURE: 63 MMHG | BODY MASS INDEX: 27.49 KG/M2 | TEMPERATURE: 97.2 F | SYSTOLIC BLOOD PRESSURE: 128 MMHG | HEART RATE: 66 BPM | WEIGHT: 192 LBS

## 2025-03-03 DIAGNOSIS — I50.32 CHRONIC DIASTOLIC HEART FAILURE: ICD-10-CM

## 2025-03-03 DIAGNOSIS — I25.10 CORONARY ARTERY DISEASE INVOLVING NATIVE CORONARY ARTERY OF NATIVE HEART WITHOUT ANGINA PECTORIS: ICD-10-CM

## 2025-03-03 DIAGNOSIS — I50.32 CHRONIC DIASTOLIC HEART FAILURE: Primary | ICD-10-CM

## 2025-03-03 DIAGNOSIS — I63.9 CEREBROVASCULAR ACCIDENT (CVA), UNSPECIFIED MECHANISM (MULTI): ICD-10-CM

## 2025-03-03 DIAGNOSIS — E78.2 MIXED HYPERLIPIDEMIA: ICD-10-CM

## 2025-03-03 DIAGNOSIS — I10 ESSENTIAL HYPERTENSION: ICD-10-CM

## 2025-03-03 LAB
AORTIC VALVE PEAK VELOCITY: 1.31 M/S
AV PEAK GRADIENT: 7 MMHG
AVA (PEAK VEL): 1.96 CM2
EJECTION FRACTION APICAL 4 CHAMBER: 69.9
EJECTION FRACTION: 64 %
LEFT ATRIUM VOLUME AREA LENGTH INDEX BSA: 40.3 ML/M2
LEFT VENTRICLE INTERNAL DIMENSION DIASTOLE: 5.04 CM (ref 3.5–6)
LEFT VENTRICULAR OUTFLOW TRACT DIAMETER: 1.83 CM
MITRAL VALVE E/A RATIO: 1.06
RIGHT VENTRICLE FREE WALL PEAK S': 13 CM/S
RIGHT VENTRICLE PEAK SYSTOLIC PRESSURE: 52.5 MMHG
TRICUSPID ANNULAR PLANE SYSTOLIC EXCURSION: 2.5 CM

## 2025-03-03 PROCEDURE — 99214 OFFICE O/P EST MOD 30 MIN: CPT | Mod: 25 | Performed by: NURSE PRACTITIONER

## 2025-03-03 PROCEDURE — 80053 COMPREHEN METABOLIC PANEL: CPT

## 2025-03-03 PROCEDURE — 84403 ASSAY OF TOTAL TESTOSTERONE: CPT

## 2025-03-03 PROCEDURE — 3074F SYST BP LT 130 MM HG: CPT | Performed by: NURSE PRACTITIONER

## 2025-03-03 PROCEDURE — 93306 TTE W/DOPPLER COMPLETE: CPT | Performed by: INTERNAL MEDICINE

## 2025-03-03 PROCEDURE — 93306 TTE W/DOPPLER COMPLETE: CPT

## 2025-03-03 PROCEDURE — 1123F ACP DISCUSS/DSCN MKR DOCD: CPT | Performed by: NURSE PRACTITIONER

## 2025-03-03 PROCEDURE — 3078F DIAST BP <80 MM HG: CPT | Performed by: NURSE PRACTITIONER

## 2025-03-03 PROCEDURE — 84153 ASSAY OF PSA TOTAL: CPT

## 2025-03-03 PROCEDURE — 1036F TOBACCO NON-USER: CPT | Performed by: NURSE PRACTITIONER

## 2025-03-03 PROCEDURE — 85025 COMPLETE CBC W/AUTO DIFF WBC: CPT

## 2025-03-03 PROCEDURE — 1157F ADVNC CARE PLAN IN RCRD: CPT | Performed by: NURSE PRACTITIONER

## 2025-03-03 PROCEDURE — 1159F MED LIST DOCD IN RCRD: CPT | Performed by: NURSE PRACTITIONER

## 2025-03-03 PROCEDURE — 1160F RVW MEDS BY RX/DR IN RCRD: CPT | Performed by: NURSE PRACTITIONER

## 2025-03-03 PROCEDURE — 99214 OFFICE O/P EST MOD 30 MIN: CPT | Performed by: NURSE PRACTITIONER

## 2025-03-03 NOTE — PROGRESS NOTES
Chief Complaint:   CHF     History Of Present Illness:    Arcadio Han is a 84 y.o. male here for follow-up of heart failure with preserved ejection fraction.   The patient is compliant with regular exercise, daily weights, and follows a low sodium diet. The patient has been well since their last office appointment and is not having any anginal symptoms, dyspnea on exertion, edema, or weight gain.  The patient has not been hospitalized for heart failure.  The patient is presently NYHA functional class I  and is euvolemic.    Weights have been stable at home.   Has not needed to take diuretic.   Cycling without complaint.     Right CEA 1/30/2023 post multiple right hemispheric cerebral infarcts.  SEH (Normal EF, Large LAD ischemia.) - 11/13/2019  Cath (75% mid-LCX, mild LAD) - 11/15/2019  Cath (Severe diag, Moderate LAD, Severe right renal) - 1/31/2014     Allergies:  Other, Lidocaine, and Monosodium glutamate    Review of Systems  All pertinent systems have been reviewed and are negative except for what is stated in the history of present illness.    All other systems have been reviewed and are negative and noncontributory to this patient's current ailments.     Visit Vitals  Smoking Status Never         Last Labs:  CBC -  Lab Results   Component Value Date    WBC 5.2 12/04/2024    HGB 11.3 (L) 12/04/2024    HCT 35.2 (L) 12/04/2024    MCV 95 12/04/2024     12/04/2024       CMP -  Lab Results   Component Value Date    CALCIUM 8.9 12/04/2024    PHOS 3.7 10/21/2024    PROT 6.7 12/04/2024    ALBUMIN 4.2 12/04/2024    AST 14 12/04/2024    ALT 14 12/04/2024    ALKPHOS 132 12/04/2024    BILITOT 0.3 12/04/2024    BUN 32 (H) 12/04/2024    CREATININE 1.72 (H) 12/04/2024       LIPID PANEL -   Lab Results   Component Value Date    CHOL 127 09/05/2023    TRIG 196 (H) 09/05/2023    HDL 36.4 (A) 09/05/2023    CHHDL 3.5 09/05/2023    LDLF 51 09/05/2023    VLDL 39 09/05/2023    NHDL 106 10/10/2017       RENAL FUNCTION  PANEL -   Lab Results   Component Value Date    GLUCOSE 90 12/04/2024     12/04/2024    K 4.4 12/04/2024     (H) 12/04/2024    CO2 26 12/04/2024    ANIONGAP 11 12/04/2024    BUN 32 (H) 12/04/2024    CREATININE 1.72 (H) 12/04/2024    GFRMALE 37 (A) 07/11/2023    CALCIUM 8.9 12/04/2024    PHOS 3.7 10/21/2024    ALBUMIN 4.2 12/04/2024        Lab Results   Component Value Date     (H) 12/15/2022    HGBA1C 5.4 12/15/2022         Objective   Vitals reviewed.   Constitutional:       Appearance: Healthy appearance. Not in distress.   Neck:      Vascular: No JVR. JVD normal.   Pulmonary:      Effort: Pulmonary effort is normal.      Breath sounds: Normal breath sounds. No wheezing. No rhonchi. No rales.   Chest:      Chest wall: Not tender to palpatation.   Cardiovascular:      PMI at left midclavicular line. Normal rate. Regular rhythm. Normal S1. Normal S2.       Murmurs: There is a grade 1/6 systolic murmur at the URSB.      No gallop.  No click. No rub.   Edema:     Peripheral edema absent.   Abdominal:      General: Bowel sounds are normal.      Palpations: Abdomen is soft.      Tenderness: There is no abdominal tenderness.   Musculoskeletal:         General: No tenderness. Skin:     General: Skin is warm and dry.   Neurological:      General: No focal deficit present.      Mental Status: Alert and oriented to person, place and time.   Psychiatric:         Attention and Perception: Attention normal.         Mood and Affect: Mood normal.       Assessment/Plan   Diagnoses and all orders for this visit:  Chronic diastolic heart failure (CMS/HCC)  - Maintaining euvolemia  - has not needed to bumex  - BP too soft for entresto and beck.   - continue farxiga   Coronary artery disease involving native coronary artery of native heart without angina pectoris  - Cath negative for significant dx  - continue livalo/asa  Mixed hyperlipidemia  - continue livalo  - trigs mildly elevated- on vascepa, otherwise  cholesterol well controlled   Essential hypertension  - well controlled   - continue losartan   Cerebrovascular accident (CVA), unspecified mechanism (CMS/HCC)  - on plavix/asa   Tricuspid Regurgitation   - moderate     Follow up 6 months     Outpatient Medications:  Current Outpatient Medications   Medication Instructions    aspirin 81 mg, Daily    azelastine (Astelin) 137 mcg (0.1 %) nasal spray 2 sprays, 2 times daily    calcium 500 mg calcium (1,250 mg) tablet 1 tablet, 2 times daily (morning and late afternoon)    cholecalciferol (VITAMIN D-3) 1,000 Units, Daily RT    clopidogrel (Plavix) 75 mg tablet 1 tablet, Daily    Farxiga 10 mg, oral, Daily    gabapentin (NEURONTIN) 300 mg, oral, 3 times daily    hydrocortisone (Anusol-HC) 2.5 % rectal cream APPLY RECTALLY TWICE DAILY FOR 7 DAYS THEN AS NEEDED    icosapent ethyL (Vascepa) 1 gram capsule TAKE 2 CAPSULES BY MOUTH TWO TIMES A DAY WITH FOOD (SWALLOW WHOLE, DO NOT CHEW, OPEN OR CRUSH)    losartan (COZAAR) 25 mg, Daily    multivitamin tablet PRN    pitavastatin calcium (Livalo) 2 mg tablet 1 tablet, oral, Daily    tamsulosin (FLOMAX) 0.4 mg, Daily       Exclusive of any other services or procedures performed, I, Angeles BALDWIN, spent 25 minutes in duration for this visit today.  This time consisted of chart review, obtaining history, and/or performing the exam as documented above, as well as, documenting the clinical information for the encounter in the electronic record, discussing treatment options, plans, and/or goals with patient, family, and/or caregiver, refilling medications, updating the electronic record, ordering medicines, lab work, imaging, referrals, and/or procedures as documented above and communicating with other [facility] professionals. I have discussed the results of laboratory, radiology, and cardiology studies with the patient and their family/caregiver.       I reviewed TTE

## 2025-03-04 ENCOUNTER — LAB (OUTPATIENT)
Dept: LAB | Facility: HOSPITAL | Age: 85
End: 2025-03-04
Payer: MEDICARE

## 2025-03-04 DIAGNOSIS — C61 ADENOCARCINOMA OF PROSTATE (MULTI): ICD-10-CM

## 2025-03-04 DIAGNOSIS — C61 MALIGNANT NEOPLASM OF PROSTATE (MULTI): Primary | ICD-10-CM

## 2025-03-04 LAB
ALBUMIN SERPL BCP-MCNC: 4.2 G/DL (ref 3.4–5)
ALP SERPL-CCNC: 137 U/L (ref 33–136)
ALT SERPL W P-5'-P-CCNC: 14 U/L (ref 10–52)
ANION GAP SERPL CALC-SCNC: 13 MMOL/L (ref 10–20)
AST SERPL W P-5'-P-CCNC: 18 U/L (ref 9–39)
BASOPHILS # BLD AUTO: 0.02 X10*3/UL (ref 0–0.1)
BASOPHILS NFR BLD AUTO: 0.4 %
BILIRUB SERPL-MCNC: 0.3 MG/DL (ref 0–1.2)
BUN SERPL-MCNC: 28 MG/DL (ref 6–23)
CALCIUM SERPL-MCNC: 9.2 MG/DL (ref 8.6–10.6)
CHLORIDE SERPL-SCNC: 104 MMOL/L (ref 98–107)
CO2 SERPL-SCNC: 26 MMOL/L (ref 21–32)
CREAT SERPL-MCNC: 2.02 MG/DL (ref 0.5–1.3)
EGFRCR SERPLBLD CKD-EPI 2021: 32 ML/MIN/1.73M*2
EOSINOPHIL # BLD AUTO: 0.16 X10*3/UL (ref 0–0.4)
EOSINOPHIL NFR BLD AUTO: 3 %
ERYTHROCYTE [DISTWIDTH] IN BLOOD BY AUTOMATED COUNT: 14 % (ref 11.5–14.5)
GLUCOSE SERPL-MCNC: 85 MG/DL (ref 74–99)
HCT VFR BLD AUTO: 34.6 % (ref 41–52)
HGB BLD-MCNC: 11.3 G/DL (ref 13.5–17.5)
HOLD SPECIMEN: NORMAL
IMM GRANULOCYTES # BLD AUTO: 0.02 X10*3/UL (ref 0–0.5)
IMM GRANULOCYTES NFR BLD AUTO: 0.4 % (ref 0–0.9)
LYMPHOCYTES # BLD AUTO: 0.65 X10*3/UL (ref 0.8–3)
LYMPHOCYTES NFR BLD AUTO: 12.3 %
MCH RBC QN AUTO: 30.5 PG (ref 26–34)
MCHC RBC AUTO-ENTMCNC: 32.7 G/DL (ref 32–36)
MCV RBC AUTO: 93 FL (ref 80–100)
MONOCYTES # BLD AUTO: 0.61 X10*3/UL (ref 0.05–0.8)
MONOCYTES NFR BLD AUTO: 11.6 %
NEUTROPHILS # BLD AUTO: 3.82 X10*3/UL (ref 1.6–5.5)
NEUTROPHILS NFR BLD AUTO: 72.3 %
NRBC BLD-RTO: 0 /100 WBCS (ref 0–0)
PLATELET # BLD AUTO: 148 X10*3/UL (ref 150–450)
POTASSIUM SERPL-SCNC: 5.4 MMOL/L (ref 3.5–5.3)
PROT SERPL-MCNC: 7 G/DL (ref 6.4–8.2)
PSA SERPL-MCNC: 0.27 NG/ML
RBC # BLD AUTO: 3.71 X10*6/UL (ref 4.5–5.9)
SODIUM SERPL-SCNC: 138 MMOL/L (ref 136–145)
TESTOST SERPL-MCNC: <30 NG/DL (ref 240–1000)
WBC # BLD AUTO: 5.3 X10*3/UL (ref 4.4–11.3)

## 2025-03-07 ENCOUNTER — TELEPHONE (OUTPATIENT)
Dept: HEMATOLOGY/ONCOLOGY | Facility: HOSPITAL | Age: 85
End: 2025-03-07
Payer: MEDICARE

## 2025-03-07 NOTE — TELEPHONE ENCOUNTER
Left message to discuss the labs went to voicemail keep follow-up appointment recommendations to discuss with his nephrologist and primary care physician regarding borderline elevated potassium and worsening creatinine testosterone remains suppressed but very minimal increase in PSA keep follow-up appointment to discuss

## 2025-03-11 ENCOUNTER — TELEMEDICINE (OUTPATIENT)
Dept: HEMATOLOGY/ONCOLOGY | Facility: CLINIC | Age: 85
End: 2025-03-11
Payer: MEDICARE

## 2025-03-11 DIAGNOSIS — C61 ADENOCARCINOMA OF PROSTATE (MULTI): ICD-10-CM

## 2025-03-11 DIAGNOSIS — E03.9 HYPOTHYROIDISM, UNSPECIFIED TYPE: ICD-10-CM

## 2025-03-11 PROCEDURE — 99213 OFFICE O/P EST LOW 20 MIN: CPT | Performed by: INTERNAL MEDICINE

## 2025-03-11 PROCEDURE — 1157F ADVNC CARE PLAN IN RCRD: CPT | Performed by: INTERNAL MEDICINE

## 2025-03-11 PROCEDURE — 1123F ACP DISCUSS/DSCN MKR DOCD: CPT | Performed by: INTERNAL MEDICINE

## 2025-03-11 NOTE — PROGRESS NOTES
Oncology Follow up Note  Phone visit    Cancer History  Prostate Cancer - High Risk , Local Prostate  Treatment  -elevated PSA 11.18, MRI 2/2022 - 2.7x2.5 right prostate, EPE, SV invasion, no LAD, 3/2022 - BS - negative, Prostate biopsy 3/22 - multiple cores of Gr Gr 4 , PNI, refused PET PSMA  -ADT / Eligard 3m 4/19/22  - SBRT to prostate and seminal vesicles, treatment Dates: 05/27/2022-06/08/2022  Radiation Dose Prescribed: 3750 cGy in 5 fractions, 750 cGy per fraction  -last ADT 12/23 , completed 2 yrs of therapy lupron 3 m    Provider Team  Berta Burris APRN-CNP   Lea Watson, MD Rico Ahuja - neurologist  Cardiology - Alejandra Craig  ENT - Idania Garcia / nephrology / [facility]   Issa Zavaleta    Other contributing history  CAD, trigger finger, obesity, HTN, HL, CKD, headaches.migraines, carotid stenosis, GERD, CEA 1/23, Stroke /CVA 12/22, Anemia elevated SFLC , left wrist issues, anemia followed by heme    Interval history:  The patient presents for follow up.  Recent COVID   Still having issues with fatigue and hot flashes.  Denies any worsening or improvement of his urinary symptoms.  Denies any ongoing issues with nausea, vomiting, fevers, chills, easy bruising or bleeding denies any chest pain or chest tightness.  Continues to follow-up with his primary care physician did have some issues with some significant fatigue and slowly recovering with cough and shortness of breath and wheezing that has recovered after his COVID diagnosis.    ROS:  10-pt ROS reviewed and negative except as mentioned above.    Medications: below was reviewed and is accurate  Current Outpatient Medications   Medication Instructions    aspirin 81 mg, Daily    azelastine (Astelin) 137 mcg (0.1 %) nasal spray 2 sprays, 2 times daily    calcium 500 mg calcium (1,250 mg) tablet 1 tablet, 2 times daily (morning and late afternoon)     cholecalciferol (VITAMIN D-3) 1,000 Units, Daily RT    clopidogrel (Plavix) 75 mg tablet 1 tablet, Daily    Farxiga 10 mg, oral, Daily    gabapentin (NEURONTIN) 300 mg, oral, 3 times daily    hydrocortisone (Anusol-HC) 2.5 % rectal cream APPLY RECTALLY TWICE DAILY FOR 7 DAYS THEN AS NEEDED    icosapent ethyL (Vascepa) 1 gram capsule TAKE 2 CAPSULES BY MOUTH TWO TIMES A DAY WITH FOOD (SWALLOW WHOLE, DO NOT CHEW, OPEN OR CRUSH)    losartan (COZAAR) 25 mg, Daily    multivitamin tablet PRN    pitavastatin calcium (Livalo) 2 mg tablet 1 tablet, oral, Daily    tamsulosin (FLOMAX) 0.4 mg, Daily        Detailed family history and social history reviewed in detail and updated per epic charting and in detail with the patient    Physical exam:  There were no vitals filed for this visit.  Phone visit     Radiology review  Reviewed in detail personally and for detail see results in EPIC        Genomics Data    Laboratory review  Reviewed in detail personally and for detail see results in [facility]  Lab Results   Component Value Date    WBC 5.3 03/03/2025    HGB 11.3 (L) 03/03/2025    HCT 34.6 (L) 03/03/2025    MCV 93 03/03/2025     (L) 03/03/2025     Lab Results   Component Value Date    GLUCOSE 85 03/03/2025    CALCIUM 9.2 03/03/2025     03/03/2025    K 5.4 (H) 03/03/2025    CO2 26 03/03/2025     03/03/2025    BUN 28 (H) 03/03/2025    CREATININE 2.02 (H) 03/03/2025     Lab Results   Component Value Date    PSA 0.27 03/03/2025    PSA 0.09 12/04/2024    PSA 0.04 09/19/2024     Lab Results   Component Value Date    ALT 14 03/03/2025    AST 18 03/03/2025    ALKPHOS 137 (H) 03/03/2025    BILITOT 0.3 03/03/2025     Lab Results   Component Value Date    TESTOSTERONE <30 (L) 03/03/2025         ASSESSMENT AND PLAN   Prostate Cancer -discussed in detail he still remains without any recovery of his testosterone and his last ADT is almost close to a year and a half ago.  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  Will continue to monitor closely.    Anemia-continue follow-up with hematology     Chronic kidney disease-continue follow-up with nephrology    discussed need while on ADT  maintain adequate cardiovascular health, exercise, dietary modifications,  bone health with calcium 1000 mg with vitamin D 400-800 international units    Denis Ocasio MD  Senior Attending Physician/  in Medicine [facility] [employer] [facility] [employer]  [facility] / [facility]  Patient line [phone]  Fax [phone]

## 2025-04-01 ENCOUNTER — LAB (OUTPATIENT)
Dept: LAB | Facility: HOSPITAL | Age: 85
End: 2025-04-01
Payer: MEDICARE

## 2025-04-01 DIAGNOSIS — C61 ADENOCARCINOMA OF PROSTATE (MULTI): ICD-10-CM

## 2025-04-01 DIAGNOSIS — C61 MALIGNANT NEOPLASM OF PROSTATE (MULTI): Primary | ICD-10-CM

## 2025-04-01 LAB
ALBUMIN SERPL BCP-MCNC: 4 G/DL (ref 3.4–5)
ALP SERPL-CCNC: 121 U/L (ref 33–136)
ALT SERPL W P-5'-P-CCNC: 14 U/L (ref 10–52)
ANION GAP SERPL CALC-SCNC: 12 MMOL/L (ref 10–20)
AST SERPL W P-5'-P-CCNC: 14 U/L (ref 9–39)
BILIRUB SERPL-MCNC: 0.3 MG/DL (ref 0–1.2)
BUN SERPL-MCNC: 30 MG/DL (ref 6–23)
CALCIUM SERPL-MCNC: 8.8 MG/DL (ref 8.6–10.3)
CHLORIDE SERPL-SCNC: 110 MMOL/L (ref 98–107)
CO2 SERPL-SCNC: 24 MMOL/L (ref 21–32)
CREAT SERPL-MCNC: 1.56 MG/DL (ref 0.5–1.3)
EGFRCR SERPLBLD CKD-EPI 2021: 44 ML/MIN/1.73M*2
GLUCOSE SERPL-MCNC: 100 MG/DL (ref 74–99)
POTASSIUM SERPL-SCNC: 4.8 MMOL/L (ref 3.5–5.3)
PROT SERPL-MCNC: 6.6 G/DL (ref 6.4–8.2)
PSA SERPL-MCNC: 0.35 NG/ML
SODIUM SERPL-SCNC: 141 MMOL/L (ref 136–145)

## 2025-04-01 PROCEDURE — 80053 COMPREHEN METABOLIC PANEL: CPT

## 2025-04-01 PROCEDURE — 84153 ASSAY OF PSA TOTAL: CPT

## 2025-04-02 DIAGNOSIS — C61 ADENOCARCINOMA OF PROSTATE (MULTI): Primary | ICD-10-CM

## 2025-04-03 ENCOUNTER — TELEPHONE (OUTPATIENT)
Dept: HEMATOLOGY/ONCOLOGY | Facility: CLINIC | Age: 85
End: 2025-04-03
Payer: MEDICARE

## 2025-04-03 DIAGNOSIS — C61 ADENOCARCINOMA OF PROSTATE (MULTI): Primary | ICD-10-CM

## 2025-04-03 PROCEDURE — 84403 ASSAY OF TOTAL TESTOSTERONE: CPT

## 2025-04-03 NOTE — TELEPHONE ENCOUNTER
Updated  Recommend Testosterone   Rising PSA  BCR closer to 2, yet concerned with supressed T  Will discuss with rad onc  Keep visit next week

## 2025-04-04 ENCOUNTER — LAB (OUTPATIENT)
Dept: LAB | Facility: HOSPITAL | Age: 85
End: 2025-04-04
Payer: MEDICARE

## 2025-04-04 DIAGNOSIS — C61 MALIGNANT NEOPLASM OF PROSTATE (MULTI): Primary | ICD-10-CM

## 2025-04-04 LAB — TESTOST SERPL-MCNC: <30 NG/DL (ref 240–1000)

## 2025-04-09 ENCOUNTER — TELEMEDICINE (OUTPATIENT)
Dept: HEMATOLOGY/ONCOLOGY | Facility: CLINIC | Age: 85
End: 2025-04-09
Payer: MEDICARE

## 2025-04-09 DIAGNOSIS — C61 ADENOCARCINOMA OF PROSTATE (MULTI): ICD-10-CM

## 2025-04-09 PROCEDURE — 1157F ADVNC CARE PLAN IN RCRD: CPT | Performed by: INTERNAL MEDICINE

## 2025-04-09 PROCEDURE — 1123F ACP DISCUSS/DSCN MKR DOCD: CPT | Performed by: INTERNAL MEDICINE

## 2025-04-09 PROCEDURE — 99214 OFFICE O/P EST MOD 30 MIN: CPT | Performed by: INTERNAL MEDICINE

## 2025-04-09 NOTE — PROGRESS NOTES
Oncology Follow up Note  Virtual or Telephone Consent    An interactive audio and video telecommunication system which permits real time communications between the patient (at the originating site) and provider (at the distant site) was utilized to provide this telehealth service.   Verbal consent was requested and obtained from Dago Han on this date, 04/09/25 for a telehealth visit and the patient's location was confirmed at the time of the visit.      Cancer History  Prostate Cancer - High Risk , Local Prostate  Treatment  -elevated PSA 11.18, MRI 2/2022 - 2.7x2.5 right prostate, EPE, SV invasion, no LAD, 3/2022 - BS - negative, Prostate biopsy 3/22 - multiple cores of Gr Gr 4 , PNI, refused PET PSMA  -ADT / Eligard 3m 4/19/22  - SBRT to prostate and seminal vesicles, treatment Dates: 05/27/2022-06/08/2022  Radiation Dose Prescribed: 3750 cGy in 5 fractions, 750 cGy per fraction  -last ADT 12/23 , completed 2 yrs of therapy lupron 3 m    Provider Team  Denis Ocasio MD Jennifer Marie Pamfilie, MD Lee Ponsky Charles MacCallum Daniel Miller - neurologist  Cardiology - Alejandra Craig  ENT - Idania Garcia / nephrology / [facility]   Issa Zavaleta    Other contributing history  CAD, trigger finger, obesity, HTN, HL, CKD, headaches.migraines, carotid stenosis, GERD, CEA 1/23, Stroke /CVA 12/22, Anemia elevated SFLC , left wrist issues, anemia followed by heme    Interval history:  The patient presents for follow up.  Patient is overall doing well still ongoing issues with some urinary symptoms appetite and energy as well denies any issues with nausea, vomiting, fatigue or any other major complaints.  Denies any chest pain or chest tightness.  Denies any bruising or bleeding.    ROS:  10-pt ROS reviewed and negative except as mentioned above.    Medications: below was reviewed and is accurate  Current Outpatient Medications   Medication Instructions     aspirin 81 mg, Daily    azelastine (Astelin) 137 mcg (0.1 %) nasal spray 2 sprays, 2 times daily    calcium 500 mg calcium (1,250 mg) tablet 1 tablet, 2 times daily (morning and late afternoon)    cholecalciferol (VITAMIN D-3) 1,000 Units, Daily RT    clopidogrel (Plavix) 75 mg tablet 1 tablet, Daily    Farxiga 10 mg, oral, Daily    gabapentin (NEURONTIN) 300 mg, oral, 3 times daily    hydrocortisone (Anusol-HC) 2.5 % rectal cream APPLY RECTALLY TWICE DAILY FOR 7 DAYS THEN AS NEEDED    icosapent ethyL (Vascepa) 1 gram capsule TAKE 2 CAPSULES BY MOUTH TWO TIMES A DAY WITH FOOD (SWALLOW WHOLE, DO NOT CHEW, OPEN OR CRUSH)    losartan (COZAAR) 25 mg, Daily    multivitamin tablet PRN    pitavastatin calcium (Livalo) 2 mg tablet 1 tablet, oral, Daily    tamsulosin (FLOMAX) 0.4 mg, Daily        Detailed family history and social history reviewed in detail and updated per epic charting and in detail with the patient    Physical exam:  There were no vitals filed for this visit.  Phone visit     Radiology review  Reviewed in detail personally and for detail see results in EPIC        Genomics Data    Laboratory review  Reviewed in detail personally and for detail see results in [facility]  Lab Results   Component Value Date    WBC 5.3 03/03/2025    HGB 11.3 (L) 03/03/2025    HCT 34.6 (L) 03/03/2025    MCV 93 03/03/2025     (L) 03/03/2025     Lab Results   Component Value Date    GLUCOSE 100 (H) 04/01/2025    CALCIUM 8.8 04/01/2025     04/01/2025    K 4.8 04/01/2025    CO2 24 04/01/2025     (H) 04/01/2025    BUN 30 (H) 04/01/2025    CREATININE 1.56 (H) 04/01/2025     Lab Results   Component Value Date    PSA 0.35 04/01/2025    PSA 0.27 03/03/2025    PSA 0.09 12/04/2024     Lab Results   Component Value Date    ALT 14 04/01/2025    AST 14 04/01/2025    ALKPHOS 121 04/01/2025    BILITOT 0.3 04/01/2025     Lab Results   Component Value Date    TESTOSTERONE <30 (L) 04/03/2025         ASSESSMENT AND PLAN   Prostate  Cancer -again see prior notes for detail there is still concern with that testosterone level and rising PSA as it is unclear to me why his PSA continues to rise in the setting of suppressed testosterone if 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  We will contact him once we decide best steps moving forward.    Anemia-continue follow-up with hematology     Chronic kidney disease-continue follow-up with nephrology    discussed need while on ADT  maintain adequate cardiovascular health, exercise, dietary modifications,  bone health with calcium 1000 mg with vitamin D 400-800 international units    Denis Ocasio MD  Senior Attending Physician/  in Medicine [facility] [[email or web address] [employer]  [facility] / [facility]  Patient line [phone]  Fax [phone]

## 2025-04-28 PROCEDURE — RXMED WILLOW AMBULATORY MEDICATION CHARGE

## 2025-04-30 ENCOUNTER — PHARMACY VISIT (OUTPATIENT)
Dept: PHARMACY | Facility: CLINIC | Age: 85
End: 2025-04-30
Payer: MEDICARE

## 2025-05-16 LAB
ALBUMIN SERPL-MCNC: 4.3 G/DL (ref 3.6–5.1)
ALBUMIN/CREAT UR: 17 MG/G CREAT
APPEARANCE UR: CLEAR
BILIRUB UR QL STRIP: NEGATIVE
BUN SERPL-MCNC: 35 MG/DL (ref 7–25)
BUN/CREAT SERPL: 20 (CALC) (ref 6–22)
CALCIUM SERPL-MCNC: 8.9 MG/DL (ref 8.6–10.3)
CHLORIDE SERPL-SCNC: 109 MMOL/L (ref 98–110)
CO2 SERPL-SCNC: 22 MMOL/L (ref 20–32)
COLOR UR: YELLOW
CREAT SERPL-MCNC: 1.75 MG/DL (ref 0.7–1.22)
CREAT UR-MCNC: 69 MG/DL (ref 20–320)
CREAT UR-MCNC: 69 MG/DL (ref 20–320)
EGFRCR SERPLBLD CKD-EPI 2021: 38 ML/MIN/1.73M2
ERYTHROCYTE [DISTWIDTH] IN BLOOD BY AUTOMATED COUNT: 12.5 % (ref 11–15)
GLUCOSE SERPL-MCNC: 127 MG/DL (ref 65–139)
GLUCOSE UR QL STRIP: ABNORMAL
HCT VFR BLD AUTO: 33.8 % (ref 38.5–50)
HGB BLD-MCNC: 11.1 G/DL (ref 13.2–17.1)
HGB UR QL STRIP: NEGATIVE
KETONES UR QL STRIP: NEGATIVE
LEUKOCYTE ESTERASE UR QL STRIP: NEGATIVE
MCH RBC QN AUTO: 30.4 PG (ref 27–33)
MCHC RBC AUTO-ENTMCNC: 32.8 G/DL (ref 32–36)
MCV RBC AUTO: 92.6 FL (ref 80–100)
MICROALBUMIN UR-MCNC: 1.2 MG/DL
NITRITE UR QL STRIP: NEGATIVE
PH UR STRIP: ABNORMAL [PH] (ref 5–8)
PHOSPHATE SERPL-MCNC: 4.3 MG/DL (ref 2.1–4.3)
PLATELET # BLD AUTO: 127 THOUSAND/UL (ref 140–400)
PMV BLD REES-ECKER: 9.9 FL (ref 7.5–12.5)
POTASSIUM SERPL-SCNC: 4.9 MMOL/L (ref 3.5–5.3)
PROT UR QL STRIP: NEGATIVE
PROT UR-MCNC: 13 MG/DL (ref 5–25)
PROT/CREAT UR: 0.19 MG/MG CREAT (ref 0.03–0.15)
PROT/CREAT UR: 188 MG/G CREAT (ref 25–148)
RBC # BLD AUTO: 3.65 MILLION/UL (ref 4.2–5.8)
SODIUM SERPL-SCNC: 141 MMOL/L (ref 135–146)
SP GR UR STRIP: 1.02 (ref 1–1.03)
WBC # BLD AUTO: 4.9 THOUSAND/UL (ref 3.8–10.8)

## 2025-05-20 ENCOUNTER — APPOINTMENT (OUTPATIENT)
Dept: NEPHROLOGY | Facility: CLINIC | Age: 85
End: 2025-05-20
Payer: MEDICARE

## 2025-05-20 VITALS
DIASTOLIC BLOOD PRESSURE: 69 MMHG | SYSTOLIC BLOOD PRESSURE: 164 MMHG | HEIGHT: 70 IN | TEMPERATURE: 98.1 F | BODY MASS INDEX: 28.35 KG/M2 | HEART RATE: 65 BPM | WEIGHT: 198 LBS | OXYGEN SATURATION: 94 %

## 2025-05-20 DIAGNOSIS — N18.32 STAGE 3B CHRONIC KIDNEY DISEASE (MULTI): Primary | ICD-10-CM

## 2025-05-20 PROCEDURE — 1159F MED LIST DOCD IN RCRD: CPT | Performed by: INTERNAL MEDICINE

## 2025-05-20 PROCEDURE — 3078F DIAST BP <80 MM HG: CPT | Performed by: INTERNAL MEDICINE

## 2025-05-20 PROCEDURE — 99213 OFFICE O/P EST LOW 20 MIN: CPT | Performed by: INTERNAL MEDICINE

## 2025-05-20 PROCEDURE — 3077F SYST BP >= 140 MM HG: CPT | Performed by: INTERNAL MEDICINE

## 2025-05-20 RX ORDER — DAPAGLIFLOZIN 10 MG/1
10 TABLET, FILM COATED ORAL DAILY
Qty: 90 TABLET | Refills: 3 | Status: SHIPPED | OUTPATIENT
Start: 2025-05-20 | End: 2026-05-20

## 2025-05-20 NOTE — PROGRESS NOTES
Chief Complaint: Follow up CKD    Continued sweating, hot flashes, neuropathy  Covid in this winter  Denies nausea, vomiting, chest pain, dyspnea  No urinary symptoms    NAD  Sclera AI s inj  MMM, no sores  Deferred secondary to COVID  No edema  No tremor  No rash    CKD stage 3b  HTN not at goal here, home SBPS 120-130s   Proteinuria none    No medication changes  Labs and follow up in 6 months

## 2025-06-13 ENCOUNTER — LAB (OUTPATIENT)
Dept: LAB | Facility: HOSPITAL | Age: 85
End: 2025-06-13
Payer: MEDICARE

## 2025-06-13 DIAGNOSIS — C61 PROSTATE CANCER (MULTI): Primary | ICD-10-CM

## 2025-06-13 DIAGNOSIS — C61 ADENOCARCINOMA OF PROSTATE (MULTI): ICD-10-CM

## 2025-06-13 DIAGNOSIS — C61 PROSTATE CANCER (MULTI): ICD-10-CM

## 2025-06-13 DIAGNOSIS — C61 MALIGNANT NEOPLASM OF PROSTATE (MULTI): Primary | ICD-10-CM

## 2025-06-13 LAB
ALBUMIN SERPL BCP-MCNC: 4.2 G/DL (ref 3.4–5)
ALP SERPL-CCNC: 133 U/L (ref 33–136)
ALT SERPL W P-5'-P-CCNC: 14 U/L (ref 10–52)
ANION GAP SERPL CALC-SCNC: 13 MMOL/L (ref 10–20)
AST SERPL W P-5'-P-CCNC: 18 U/L (ref 9–39)
BASOPHILS # BLD AUTO: 0.03 X10*3/UL (ref 0–0.1)
BASOPHILS NFR BLD AUTO: 0.7 %
BILIRUB SERPL-MCNC: 0.3 MG/DL (ref 0–1.2)
BUN SERPL-MCNC: 33 MG/DL (ref 6–23)
CALCIUM SERPL-MCNC: 9.1 MG/DL (ref 8.6–10.3)
CHLORIDE SERPL-SCNC: 106 MMOL/L (ref 98–107)
CO2 SERPL-SCNC: 27 MMOL/L (ref 21–32)
CREAT SERPL-MCNC: 1.69 MG/DL (ref 0.5–1.3)
EGFRCR SERPLBLD CKD-EPI 2021: 40 ML/MIN/1.73M*2
EOSINOPHIL # BLD AUTO: 0.15 X10*3/UL (ref 0–0.4)
EOSINOPHIL NFR BLD AUTO: 3.3 %
ERYTHROCYTE [DISTWIDTH] IN BLOOD BY AUTOMATED COUNT: 13.1 % (ref 11.5–14.5)
GLUCOSE SERPL-MCNC: 101 MG/DL (ref 74–99)
HCT VFR BLD AUTO: 36.5 % (ref 41–52)
HGB BLD-MCNC: 11.7 G/DL (ref 13.5–17.5)
IMM GRANULOCYTES # BLD AUTO: 0.01 X10*3/UL (ref 0–0.5)
IMM GRANULOCYTES NFR BLD AUTO: 0.2 % (ref 0–0.9)
LYMPHOCYTES # BLD AUTO: 1.05 X10*3/UL (ref 0.8–3)
LYMPHOCYTES NFR BLD AUTO: 23.1 %
MCH RBC QN AUTO: 30.3 PG (ref 26–34)
MCHC RBC AUTO-ENTMCNC: 32.1 G/DL (ref 32–36)
MCV RBC AUTO: 95 FL (ref 80–100)
MONOCYTES # BLD AUTO: 0.47 X10*3/UL (ref 0.05–0.8)
MONOCYTES NFR BLD AUTO: 10.4 %
NEUTROPHILS # BLD AUTO: 2.83 X10*3/UL (ref 1.6–5.5)
NEUTROPHILS NFR BLD AUTO: 62.3 %
NRBC BLD-RTO: 0 /100 WBCS (ref 0–0)
PLATELET # BLD AUTO: 160 X10*3/UL (ref 150–450)
POTASSIUM SERPL-SCNC: 4.9 MMOL/L (ref 3.5–5.3)
PROT SERPL-MCNC: 6.7 G/DL (ref 6.4–8.2)
RBC # BLD AUTO: 3.86 X10*6/UL (ref 4.5–5.9)
SODIUM SERPL-SCNC: 141 MMOL/L (ref 136–145)
WBC # BLD AUTO: 4.5 X10*3/UL (ref 4.4–11.3)

## 2025-06-13 PROCEDURE — 36415 COLL VENOUS BLD VENIPUNCTURE: CPT

## 2025-06-13 PROCEDURE — 80053 COMPREHEN METABOLIC PANEL: CPT

## 2025-06-13 PROCEDURE — 84403 ASSAY OF TOTAL TESTOSTERONE: CPT

## 2025-06-13 PROCEDURE — 85025 COMPLETE CBC W/AUTO DIFF WBC: CPT

## 2025-06-13 PROCEDURE — 84153 ASSAY OF PSA TOTAL: CPT

## 2025-06-14 LAB
PSA SERPL-MCNC: 0.91 NG/ML
TESTOST SERPL-MCNC: <30 NG/DL (ref 240–1000)

## 2025-06-17 ENCOUNTER — TELEMEDICINE (OUTPATIENT)
Dept: HEMATOLOGY/ONCOLOGY | Facility: CLINIC | Age: 85
End: 2025-06-17
Payer: MEDICARE

## 2025-06-17 DIAGNOSIS — C61 PROSTATE CANCER (MULTI): Primary | ICD-10-CM

## 2025-06-17 PROCEDURE — 99214 OFFICE O/P EST MOD 30 MIN: CPT | Performed by: INTERNAL MEDICINE

## 2025-06-17 NOTE — PROGRESS NOTES
Oncology Follow up Note  Virtual or Telephone Consent    Virtual or Telephone Consent    While technically available, the patient was unable or unwilling to consent to connect via audio/video telehealth technology; therefore, I performed this visit using a real-time audio only connection between Dago Han & Denis Ocasio MD.  Verbal consent was requested and obtained from Dago Han on this date, 06/17/25 for a telehealth visit and the patient's location was confirmed at the time of the visit.      Cancer History  Prostate Cancer - High Risk , Local Prostate  Treatment  -elevated PSA 11.18, MRI 2/2022 - 2.7x2.5 right prostate, EPE, SV invasion, no LAD, 3/2022 - BS - negative, Prostate biopsy 3/22 - multiple cores of Gr Gr 4 , PNI, refused PET PSMA  -ADT / Eligard 3m 4/19/22  - SBRT to prostate and seminal vesicles, treatment Dates: 05/27/2022-06/08/2022  Radiation Dose Prescribed: 3750 cGy in 5 fractions, 750 cGy per fraction  -last ADT 12/23 , completed 2 yrs of therapy lupron 3 m    Provider Team  No ref. provider found   MD Rico Kat - neurologist  Cardiology - Alejandra Craig  ENT - Idania Garcia / nephrology / [facility]   Issa Zavaleta    Other contributing history  CAD, trigger finger, obesity, HTN, HL, CKD, headaches.migraines, carotid stenosis, GERD, CEA 1/23, Stroke /CVA 12/22, Anemia elevated SFLC , left wrist issues, anemia followed by heme    Interval history:  He notes he is overall doing fairly well without any other major new symptoms still some ongoing issues with some energy but is slowly improving no worsening issues with hot flashes.  Denies any issues with nausea, vomiting, fevers, chills denies any other major new complaints.    ROS:  10-pt ROS reviewed and negative except as mentioned above.    Medications: below was reviewed and is accurate  Current Outpatient  Medications   Medication Instructions    aspirin 81 mg, Daily    azelastine (Astelin) 137 mcg (0.1 %) nasal spray 2 sprays, 2 times daily    calcium 500 mg calcium (1,250 mg) tablet 1 tablet, 2 times daily (morning and late afternoon)    cholecalciferol (VITAMIN D-3) 1,000 Units, Daily RT    clopidogrel (Plavix) 75 mg tablet 1 tablet, Daily    dapagliflozin propanediol (FARXIGA) 10 mg, oral, Daily    gabapentin (NEURONTIN) 300 mg, oral, 3 times daily    hydrocortisone (Anusol-HC) 2.5 % rectal cream APPLY RECTALLY TWICE DAILY FOR 7 DAYS THEN AS NEEDED    icosapent ethyL (Vascepa) 1 gram capsule TAKE 2 CAPSULES BY MOUTH TWO TIMES A DAY WITH FOOD (SWALLOW WHOLE, DO NOT CHEW, OPEN OR CRUSH)    losartan (COZAAR) 25 mg, Daily    multivitamin tablet PRN    pitavastatin calcium (Livalo) 2 mg tablet 1 tablet, oral, Daily    tamsulosin (FLOMAX) 0.4 mg, Daily        Detailed family history and social history reviewed in detail and updated per epic charting and in detail with the patient    Physical exam:  There were no vitals filed for this visit.  Phone visit     Radiology review  Reviewed in detail personally and for detail see results in EPIC        Genomics Data    Laboratory review  Reviewed in detail personally and for detail see results in [facility]  Lab Results   Component Value Date    WBC 4.5 06/13/2025    HGB 11.7 (L) 06/13/2025    HCT 36.5 (L) 06/13/2025    MCV 95 06/13/2025     06/13/2025     Lab Results   Component Value Date    GLUCOSE 101 (H) 06/13/2025    CALCIUM 9.1 06/13/2025     06/13/2025    K 4.9 06/13/2025    CO2 27 06/13/2025     06/13/2025    BUN 33 (H) 06/13/2025    CREATININE 1.69 (H) 06/13/2025     Lab Results   Component Value Date    PSA 0.91 06/13/2025    PSA 0.35 04/01/2025    PSA 0.27 03/03/2025     Lab Results   Component Value Date    ALT 14 06/13/2025    AST 18 06/13/2025    ALKPHOS 133 06/13/2025    BILITOT 0.3 06/13/2025     Lab Results   Component Value Date    TESTOSTERONE  <30 (L) 06/13/2025         ASSESSMENT AND PLAN   Prostate Cancer -PSA continues to 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  My concern exists for potential disease and discussed the pros and cons of further imaging and/or intervention.  He was agreeable to get a follow-up PET scan a PET PSMA was ordered he will contact us for any other new complaints.      Anemia-continue follow-up with hematology looks like his provider has potentially left we will put another referral in.    Chronic kidney disease-continue follow-up with nephrology    discussed need while on ADT  maintain adequate cardiovascular health, exercise, dietary modifications,  bone health with calcium 1000 mg with vitamin D 400-800 international units    Denis Ocasio MD  Senior Attending Physician/  in Medicine [facility] [employer]  [facility] / [facility]  Patient line [phone]  Fax [phone]

## 2025-06-27 ENCOUNTER — TELEPHONE (OUTPATIENT)
Dept: HEMATOLOGY/ONCOLOGY | Facility: CLINIC | Age: 85
End: 2025-06-27
Payer: MEDICARE

## 2025-06-27 ENCOUNTER — HOSPITAL ENCOUNTER (OUTPATIENT)
Dept: RADIOLOGY | Facility: HOSPITAL | Age: 85
Discharge: HOME | End: 2025-06-27
Payer: MEDICARE

## 2025-06-27 DIAGNOSIS — C61 PROSTATE CANCER (MULTI): ICD-10-CM

## 2025-06-27 DIAGNOSIS — C61 PROSTATE CANCER (MULTI): Primary | ICD-10-CM

## 2025-06-27 PROCEDURE — 3430000001 HC RX 343 DIAGNOSTIC RADIOPHARMACEUTICALS: Performed by: INTERNAL MEDICINE

## 2025-06-27 PROCEDURE — 78815 PET IMAGE W/CT SKULL-THIGH: CPT | Mod: PS

## 2025-06-27 PROCEDURE — A9596 HC RX 343 DIAGNOSTIC RADIOPHARMACEUTICALS: HCPCS | Performed by: INTERNAL MEDICINE

## 2025-06-27 PROCEDURE — 78815 PET IMAGE W/CT SKULL-THIGH: CPT | Mod: PET TUMOR SUBSQ TX STRATEGY | Performed by: RADIOLOGY

## 2025-06-27 RX ADMIN — KIT FOR THE PREPARATION OF GALLIUM GA 68 GOZETOTIDE INJECTION 5.3 MILLICURIE: KIT INTRAVENOUS at 10:57

## 2025-06-27 NOTE — TELEPHONE ENCOUNTER
Discussed in detail pet findings    At this point no metastatic disease but with doubling time and progressive rise in PSA in the setting of castrate resistant prostate cancer PET scan shows local disease and will refer back to radiation, role of MRI / biopsy  Local salvage treatment  Vs  NHT in setting of CRPC yet non mets    Discussed in detail    Referral placed to rad onc

## 2025-06-29 ENCOUNTER — PATIENT MESSAGE (OUTPATIENT)
Dept: HEMATOLOGY/ONCOLOGY | Facility: CLINIC | Age: 85
End: 2025-06-29
Payer: MEDICARE

## 2025-06-30 ENCOUNTER — TELEPHONE (OUTPATIENT)
Dept: HEMATOLOGY/ONCOLOGY | Facility: CLINIC | Age: 85
End: 2025-06-30
Payer: MEDICARE

## 2025-06-30 NOTE — PROGRESS NOTES
MD made aware via secure chat that patient called back. MD was in clinic and said he would call patient back after.

## 2025-06-30 NOTE — TELEPHONE ENCOUNTER
Updated and discussed  No clear mets  Unclear if residual tumor or not  Role of biopsy of prostate  All options discussed  And will await radiation oncology input to discuss   Next steps

## 2025-07-21 ENCOUNTER — HOSPITAL ENCOUNTER (OUTPATIENT)
Dept: RADIATION ONCOLOGY | Facility: HOSPITAL | Age: 85
Setting detail: RADIATION/ONCOLOGY SERIES
Discharge: HOME | End: 2025-07-21
Payer: MEDICARE

## 2025-07-21 VITALS
WEIGHT: 198.41 LBS | OXYGEN SATURATION: 98 % | TEMPERATURE: 96.8 F | DIASTOLIC BLOOD PRESSURE: 71 MMHG | SYSTOLIC BLOOD PRESSURE: 129 MMHG | BODY MASS INDEX: 28.47 KG/M2 | RESPIRATION RATE: 18 BRPM | HEART RATE: 82 BPM

## 2025-07-21 DIAGNOSIS — C61 PROSTATE CANCER (MULTI): Primary | ICD-10-CM

## 2025-07-21 DIAGNOSIS — I25.10 CORONARY ARTERY DISEASE INVOLVING NATIVE CORONARY ARTERY OF NATIVE HEART WITHOUT ANGINA PECTORIS: ICD-10-CM

## 2025-07-21 DIAGNOSIS — N18.31 STAGE 3A CHRONIC KIDNEY DISEASE (MULTI): ICD-10-CM

## 2025-07-21 DIAGNOSIS — I63.40 CEREBROVASCULAR ACCIDENT (CVA) DUE TO EMBOLISM OF CEREBRAL ARTERY (MULTI): ICD-10-CM

## 2025-07-21 PROCEDURE — G2211 COMPLEX E/M VISIT ADD ON: HCPCS | Performed by: RADIOLOGY

## 2025-07-21 PROCEDURE — 99215 OFFICE O/P EST HI 40 MIN: CPT | Mod: GC | Performed by: RADIOLOGY

## 2025-07-21 PROCEDURE — 99215 OFFICE O/P EST HI 40 MIN: CPT | Performed by: RADIOLOGY

## 2025-07-21 RX ORDER — VIT C/E/ZN/COPPR/LUTEIN/ZEAXAN 250MG-90MG
500 CAPSULE ORAL DAILY
COMMUNITY

## 2025-07-21 ASSESSMENT — ENCOUNTER SYMPTOMS
CONSTITUTIONAL NEGATIVE: 1
CONSTIPATION: 1
EYES NEGATIVE: 1
HEMATOLOGIC/LYMPHATIC NEGATIVE: 1
NUMBNESS: 1
FREQUENCY: 1
BACK PAIN: 1
SLEEP DISTURBANCE: 1
DIFFICULTY URINATING: 1
CARDIOVASCULAR NEGATIVE: 1
RESPIRATORY NEGATIVE: 1
HOT FLASHES: 1

## 2025-07-21 ASSESSMENT — PAIN SCALES - GENERAL: PAINLEVEL_OUTOF10: 0-NO PAIN

## 2025-07-21 ASSESSMENT — COLUMBIA-SUICIDE SEVERITY RATING SCALE - C-SSRS
6. HAVE YOU EVER DONE ANYTHING, STARTED TO DO ANYTHING, OR PREPARED TO DO ANYTHING TO END YOUR LIFE?: NO
1. IN THE PAST MONTH, HAVE YOU WISHED YOU WERE DEAD OR WISHED YOU COULD GO TO SLEEP AND NOT WAKE UP?: NO
2. HAVE YOU ACTUALLY HAD ANY THOUGHTS OF KILLING YOURSELF?: NO

## 2025-07-21 NOTE — PROGRESS NOTES
Staff Physician: Jose Craig   Referring Physician: Denis Ocasio MD  Date of Service: 7/21/2025  Patient name: Dago Han   MRN: [id number]    RADIATION ONCOLOGY CONSULT NOTE    IDENTIFYING DATA:  DIAGNOSIS: Recurrent disease, regional    DISEASE STATE: Prior treatment complete, no active treatment  DISEASE STATUS: Biochemical progression; date of progression:   Problem List Items Addressed This Visit          Hematology and Neoplasia    Prostate cancer (Multi) - Primary       Mr.. Dago Han is a 84 y.o. year-old with prostate adenocarcinoma initially diagnosed in 2022 s/p RT and ADT , referred by Denis Frazier MD following recurrence  for evaluation and discussion of treatment recommendations.    Oncology History:  -Elevated PSA 11.18, MRI 2/2022 - 2.7x2.5 right prostate, EPE, SV invasion, no LAD, 3/2022   - Prostate biopsy 3/22 - multiple cores of 4+4=8, Grade Group 4 , perineural invasion  - Eligard started on 04/2022  and last dose in 12/2023  - SBRT to prostate and seminal vesicles between 05/27/2022-06/08/2022, 3750 cGy in 5 fractions.  - Testosterone has been <30 and has not recovered. PSA trending up from undetectable to most recently 0.9 in 6/13/2025.   - PET CT prostate PSMA 6/27/2025: Focally increased Ga68 PSMA expression within the prostate gland with extension into the right seminal vesicle and apparent extension into the right vas deferens.    HISTORY OF PRESENT ILLNESS:    Today, Mr. Dago Han is accompanied by spouse.  Symptomatically, he reports:    1.  Full independence in activities of daily living.  2.  Weak urinary stream, currently on flomax 0.4mg  3.  Bowel function is normal.   4.  Normal appetite. No unintentional weight gain or loss.   5.  Pain score: > 10/10   6.  He reports recent TIA with no residual deficits. Denies history of diabetes, HgbA1c is 5.4.     PAST MEDICAL HISTORY:  Medical History[1]  PAST SURGICAL HISTORY:  Surgical  History[2]  ALLERGIES:  Allergies[3]  MEDICATIONS:  Current Medications[4]   SOCIAL HISTORY:  Social History     Tobacco Use    Smoking status: Never     Passive exposure: Never    Smokeless tobacco: Never   Substance Use Topics    Alcohol use: Never     FAMILY HISTORY:  Family History[5]    REVIEW OF SYSTEMS:  Please refer to RN note.    PHYSICAL EXAMINATION:  /71   Pulse 82   Temp 36 °C (96.8 °F) (Skin)   Resp 18   Wt 90 kg (198 lb 6.6 oz)   SpO2 98%   BMI 28.47 kg/m²   Constitutional: Well-nourished, no acute distress.  Respiratory: Breathing unlabored, no use of accessory muscles.  Neurologic/Psychiatric: Alert and oriented ×3. Mood and affect appropriate.  Musculoskeletal: Ambulates independently with normal gait.   (external): Deferred; no clinical indication for exam at this time.'    CTCAE (v5) ADVERSE EVENTS:      PERFORMANCE STATUS:  KPS/ECO, Able to carry on normal activity; minor signs or symptoms of disease (ECOG equivalent 0)    LABORATORY AND IMAGING DATA:  Imaging: All imaging was personally reviewed and interpreted in clinic. Findings as per HPI and EMR.    Laboratory/Pathology:  All pertinent labs and pathology were personally reviewed and interpreted in clinic. Findings as per HPI and EMR.  Lab Results   Component Value Date    PSA 0.91 2025    PSA 0.35 2025    PSA 0.27 2025    PSA 0.09 2024    PSA 0.04 2024    PSA 0.02 2024    PSA 0.02 2024    PSA 0.01 2023    PSA <0.10 10/03/2023    PSA <0.10 2023     Lab Results   Component Value Date    TESTOSTERONE <30 (L) 2025    TESTOSTERONE <30 (L) 2025    TESTOSTERONE <30 (L) 2025         Lab Results   Component Value Date    BUN 33 (H) 2025    CREATININE 1.69 (H) 2025    EGFR 40 (L) 2025     2025    K 4.9 2025     2025    CO2 27 2025    CALCIUM 9.1 2025    HGBA1C 5.4 12/15/2022        IMPRESSION:  84 year  old male initially diagnosed with high risk prostate cancer in 2022 (PSA 11.18, GG4, perineural,  SV invasion on MRI without LAD) s/p SBRT to prostate and SV 37.5 in 5fxs completed in 6/8/2022. Patient completed approximately 2 years of eligard, last dose in 12/2023 per patient's records. His testosterone has been <30 and has not recovered. PSA trending up from undetectable to most recently 0.9 in 6/13/2025.     I discussed salvage options for recurrent prostate cancer in the prostate, including ADT alone, reirradiation alone,  or ADT plus re-irradiation. Patient's goal aligns with maximum disease control.  I discussed placement of spaceOAR placement, if feasible, to decrease the risk of rectal toxicity.  Logistics of radiation therapy including CT and MR-based simulation and bowel and bladder preparation were discussed, as well as possible short- and long-term side effects including fatigue, toxicities to the bladder, urethra, and rectum, sexual dysfunction, and secondary malignancy in the radiation field. I emphasized that re-irradiation poses higher risks of long-term side effects to the rectum and urinary tract and discussed the risk of wound healing complications, fistula formation, urethral stricture, bladder neck dysfunction, proctitis, and rectal stricture.  Discussed ADT vs ARPI including efficacy and potential toxicities of each. Patient prefers short-term novel hormonal monotherapy which I believe is reasonable.     Plan:   Coordinate with urology to attempt spaceOAR gel insertion  Will discuss with medical oncology regarding enzalutamide vs apalutamide   Plan for SBRT 37Gy at five fractions for prostate+SV and PSMA avid right vas difference.       Thank you for the opportunity to participate in the care of this kind patient.  The patient was seen, assessed, and discussed with the attending radiation oncologist Dr. Kiara Nails MD  PGY-2 Resident  Radiation Oncology         [1]   Past  Medical History:  Diagnosis Date    Acute bronchitis due to other specified organisms 09/05/2019    Acute bacterial bronchitis    Acute frontal sinusitis, unspecified 02/28/2018    Acute frontal sinusitis    Allergy status to unspecified drugs, medicaments and biological substances 08/19/2016    History of adverse drug reaction    Anal fistula     Anal fistula    Atherosclerotic heart disease of native coronary artery without angina pectoris 12/22/2022    Atherosclerotic heart disease of native coronary artery without angina pectoris    Bruit of right carotid artery 08/22/2023    Candidiasis of skin and nail 12/06/2017    Yeast dermatitis    Carotid artery obstruction, 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    Carotid stenosis, asymptomatic, right 08/22/2023    Cellulitis of unspecified toe 02/12/2016    Paronychia of toe    Chronic sinusitis, unspecified 09/26/2019    Sinobronchitis    Contact with and (suspected) exposure to unspecified communicable disease 06/21/2017    Exposure to communicable disease    Essential (primary) hypertension 09/12/2022    Essential hypertension    Headache, unspecified 02/27/2018    Chronic daily headache    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    Anemia due to blood loss    Lumbago with sciatica, right side 05/16/2019    Chronic right-sided low back pain with right-sided sciatica    Mixed hyperlipidemia 02/28/2022    Mixed hyperlipidemia    Occlusion and stenosis of bilateral carotid arteries     Carotid artery obstruction, bilateral    Olecranon bursitis, left elbow 12/06/2017    Olecranon bursitis of left elbow    Other abnormalities of gait and mobility 10/15/2019    Imbalance    Other allergic rhinitis 04/26/2016    Perennial allergic rhinitis    Other symptoms and signs involving general sensations and perceptions 10/05/2016    Sensation of pressure in face    Other symptoms and signs involving the musculoskeletal system 08/24/2018    Arm weakness    Pain in right  ankle and joints of right foot 03/23/2018    Right ankle pain    Pain in unspecified foot 03/11/2015    Foot pain    Pain in unspecified hand 07/01/2014    Hand pain    Personal history of diseases of the skin and subcutaneous tissue 03/11/2015    History of onychia and paronychia    Personal history of other diseases of male genital organs 09/06/2016    History of acute prostatitis    Personal history of other diseases of male genital organs 03/05/2014    History of prostatitis    Personal history of other diseases of male genital organs 06/28/2016    History of benign prostatic hyperplasia    Personal history of other diseases of the musculoskeletal system and connective tissue 02/27/2018    History of muscle pain    Personal history of other diseases of the nervous system and sense organs 10/17/2013    History of diplopia    Personal history of other diseases of the nervous system and sense organs 11/14/2014    History of carpal tunnel syndrome    Personal history of other diseases of the respiratory system 09/24/2016    History of chronic sinusitis    Personal history of other diseases of the respiratory system 10/09/2014    History of chronic sinusitis    Personal history of other diseases of the respiratory system 03/30/2022    History of acute bacterial sinusitis    Personal history of other diseases of the respiratory system 08/02/2017    History of acute bacterial sinusitis    Personal history of other diseases of the respiratory system 09/19/2016    History of sinusitis    Personal history of other specified conditions 10/10/2016    History of dizziness    Personal history of other specified conditions 11/06/2014    History of vertigo    Personal history of other specified conditions 03/28/2017    History of urinary hesitancy    Polyp of cecum 01/14/2019    Rectal abscess 11/01/2017    Perirectal cellulitis    Rectal abscess 05/15/2017    Perirectal abscess    Sensorineural hearing loss, bilateral  10/10/2016    Bilateral sensorineural hearing loss    Strain of muscle, fascia and tendon of lower back, initial encounter 11/30/2021    Lumbar strain    Testicular pain, unspecified 04/09/2019    Persistent testicular pain    Unspecified acute conjunctivitis, bilateral 03/28/2017    Conjunctivitis, acute, bilateral    Unspecified otitis externa, unspecified ear 03/12/2015    Otitis externa    Unspecified symptoms and signs involving the genitourinary system 12/21/2017    UTI symptoms   [2]   Past Surgical History:  Procedure Laterality Date    LUMBAR LAMINECTOMY  10/17/2013    Laminectomy Lumbar    MR HEAD ANGIO WO IV CONTRAST  10/29/2013    MR HEAD ANGIO WO IV CONTRAST 10/29/2013 [facility] ANCILLARY LEGACY    MR HEAD ANGIO WO IV CONTRAST  10/18/2016    MR HEAD ANGIO WO IV CONTRAST 10/18/2016 [facility] ANCILLARY LEGACY    MR HEAD ANGIO WO IV CONTRAST  12/14/2022    MR HEAD ANGIO WO IV CONTRAST 12/14/2022 DOCTOR OFFICE LEGACY    MR NECK ANGIO WO IV CONTRAST  11/1/2019    MR NECK ANGIO WO IV CONTRAST 11/1/2019 [facility] CLINICAL LEGACY    MR NECK ANGIO WO IV CONTRAST  6/19/2021    MR NECK ANGIO WO IV CONTRAST 6/19/2021 [facility] ANCILLARY LEGACY    MR NECK ANGIO WO IV CONTRAST  11/8/2016    MR NECK ANGIO WO IV CONTRAST 11/8/2016 [facility] ANCILLARY LEGAC[facility]    MR NECK ANGIO WO IV CONTRAST  12/14/2022    MR NECK ANGIO WO IV CONTRAST 12/14/2022 DOCTOR OFFICE LEGACY    OTHER SURGICAL HISTORY  10/17/2013    Transurethral Resection Of Prostate    OTHER SURGICAL HISTORY  02/13/2014    Cardiac Cath Procedure Outcome: Successful   [3]   Allergies  Allergen Reactions    Other Headache     chocolate    Lidocaine Itching    Monosodium Glutamate Unknown   [4]   Current Outpatient Medications:     aspirin 81 mg capsule, Take 81 mg by mouth once daily., Disp: , Rfl:     azelastine (Astelin) 137 mcg (0.1 %) nasal spray, Administer 2 sprays into affected nostril(s) twice a day., Disp: , Rfl:     calcium 500 mg calcium (1,250 mg) tablet, Take 1 tablet (1,250 mg)  by mouth 2 times daily (morning and late afternoon)., Disp: , Rfl:     cholecalciferol (Vitamin D-3) 25 MCG (1000 UT) capsule, Take 1 capsule (25 mcg) by mouth once daily., Disp: , Rfl:     clopidogrel (Plavix) 75 mg tablet, Take 1 tablet (75 mg) by mouth once daily., Disp: , Rfl:     dapagliflozin propanediol (Farxiga) 10 mg tablet, Take 1 tablet (10 mg) by mouth once daily., Disp: 90 tablet, Rfl: 3    gabapentin (Neurontin) 300 mg capsule, Take 1 capsule (300 mg) by mouth 3 times a day., Disp: 90 capsule, Rfl: 1    hydrocortisone (Anusol-HC) 2.5 % rectal cream, APPLY RECTALLY TWICE DAILY FOR 7 DAYS THEN AS NEEDED, Disp: , Rfl:     icosapent ethyL (Vascepa) 1 gram capsule, TAKE 2 CAPSULES BY MOUTH TWO TIMES A DAY WITH FOOD (SWALLOW WHOLE, DO NOT CHEW, OPEN OR CRUSH), Disp: 360 capsule, Rfl: 3    losartan (Cozaar) 25 mg tablet, Take 1 tablet (25 mg) by mouth once daily., Disp: , Rfl:     multivitamin tablet, PRN, Disp: , Rfl:     pitavastatin calcium (Livalo) 2 mg tablet, Take 1 tablet by mouth once daily., Disp: 90 tablet, Rfl: 2    tamsulosin (Flomax) 0.4 mg 24 hr capsule, Take 1 capsule (0.4 mg) by mouth once daily., Disp: , Rfl:   [5]   Family History  Problem Relation Name Age of Onset    Multiple myeloma Mother      Lung cancer Father      Multiple myeloma Brother      Other (sarcoma) Brother

## 2025-07-21 NOTE — PROGRESS NOTES
Radiation Oncology Nursing Note    IPSS (International Prostate Symptom Score):   9 / 35, bother 2  In the past month:   Incomplete Emptying - How often have you had the sensation of not emptying you bladder?   1 (less than 1 in 5 times)  Frequency - How often have you had to urinate less than every 2 hours?   1 (less than 1 in 5 times)  Intermittency - How often have you found you stopped and started again several times when you urinated?   0 (not at all)  Urgency - How often have you found it difficult to postpone urination?   0 (not at all)  Weak stream - How often have you had a weak urinary stream?   5 (almost always)  Straining - How often have you had to strain to start urination?   0 (not at all)  Nocturia - How many times did you typically get up at night to urinate?   2 (2 times)  Quality of Life due to Urinary Symptoms  If you were to spend the rest of your life with your urinary condition just the way it is now, how would you feel about that?   2 (Mostly Satisfied)     - He is not experiencing urinary incontinence.  Flomax    - He is not experiencing dysuria, hematuria, flank pain.     Sexual function:   - Quality of erections during the last 4 weeks: 1 = None at all  - Use of erectile dysfunction medications:  None    Bowel function:    - Denies hematochezia or pain.    - He does have a history of hemorrhoids.    - He does not have a history of inflammatory bowel disease (Crohn's, Ulcerative Colitis).    Prior Radiotherapy:  Yes, describe: SBRT 5fx to prostate +SV finished 6/8/2022  Radiation Treatments       No radiation treatments to show. (Treatments may have been administered in another system.)            Current Systemic Treatment:  No     Presence of Pacemaker or ICD:  No    History of Autoimmune or Connective Tissue Disorders:  No    Pain: The patient's current pain level was assessed.  They report currently having a pain of 0 out of 10.  They feel their pain is under control without the use of  pain medications.    Review of Systems:  Review of Systems   Constitutional: Negative.    HENT:  Negative.     Eyes: Negative.    Respiratory: Negative.     Cardiovascular: Negative.    Gastrointestinal:  Positive for constipation (every 2-3 days).   Endocrine: Positive for hot flashes (low testosterone).   Genitourinary:  Positive for difficulty urinating (weaker stream almost always), frequency (occ.) and nocturia (x2).         Occ. Incompletely emptying   Musculoskeletal:  Positive for back pain (occasional).   Skin: Negative.    Neurological:  Positive for numbness (on feet / legs and occ in hands).   Hematological: Negative.    Psychiatric/Behavioral:  Positive for sleep disturbance (hot flashes and neuropathy).       Education:  I provided and reviewed the following patient education materials with the patient:    What to expect - external beam radiation  Radiation step by step  Radiation therapy to the male pelvis  How to prepare for prostate CT simulation and radiation (tri fold)  SpaceOAR and Fiducial Marker Placement  Miralax use during radiation  Ways to avoid gas during radiation

## 2025-07-25 ENCOUNTER — HOSPITAL ENCOUNTER (OUTPATIENT)
Facility: CLINIC | Age: 85
Setting detail: OUTPATIENT SURGERY
End: 2025-07-25
Attending: STUDENT IN AN ORGANIZED HEALTH CARE EDUCATION/TRAINING PROGRAM | Admitting: STUDENT IN AN ORGANIZED HEALTH CARE EDUCATION/TRAINING PROGRAM
Payer: MEDICARE

## 2025-07-25 DIAGNOSIS — C61 PROSTATE CANCER (MULTI): ICD-10-CM

## 2025-07-25 NOTE — PROGRESS NOTES
Spoke with patient and scheduled for spaceOAR no fiducial per DR. Craig. Patient agreed to 8/20/25 information sent to patient.

## 2025-07-28 ENCOUNTER — APPOINTMENT (OUTPATIENT)
Dept: RADIATION ONCOLOGY | Facility: HOSPITAL | Age: 85
End: 2025-07-28
Payer: MEDICARE

## 2025-07-28 DIAGNOSIS — N18.32 STAGE 3B CHRONIC KIDNEY DISEASE (MULTI): ICD-10-CM

## 2025-07-28 RX ORDER — DAPAGLIFLOZIN 10 MG/1
10 TABLET, FILM COATED ORAL DAILY
Qty: 90 TABLET | Refills: 3 | Status: CANCELLED | OUTPATIENT
Start: 2025-07-28 | End: 2026-07-28

## 2025-07-29 ENCOUNTER — TELEPHONE (OUTPATIENT)
Dept: RADIATION ONCOLOGY | Facility: HOSPITAL | Age: 85
End: 2025-07-29
Payer: MEDICARE

## 2025-07-30 ENCOUNTER — TELEPHONE (OUTPATIENT)
Dept: HEMATOLOGY/ONCOLOGY | Facility: CLINIC | Age: 85
End: 2025-07-30
Payer: MEDICARE

## 2025-07-30 ENCOUNTER — SPECIALTY PHARMACY (OUTPATIENT)
Dept: PHARMACY | Facility: CLINIC | Age: 85
End: 2025-07-30

## 2025-07-30 ENCOUNTER — APPOINTMENT (OUTPATIENT)
Dept: RADIATION ONCOLOGY | Facility: HOSPITAL | Age: 85
End: 2025-07-30
Payer: MEDICARE

## 2025-07-30 DIAGNOSIS — C61 ADENOCARCINOMA OF PROSTATE (MULTI): Primary | ICD-10-CM

## 2025-07-30 RX ORDER — PREDNISONE 5 MG/1
5 TABLET ORAL DAILY
Qty: 30 TABLET | Refills: 11 | Status: SHIPPED | OUTPATIENT
Start: 2025-07-30 | End: 2026-07-30

## 2025-07-30 RX ORDER — ABIRATERONE 500 MG/1
1000 TABLET ORAL DAILY
Qty: 60 TABLET | Refills: 11 | Status: SHIPPED | OUTPATIENT
Start: 2025-07-30 | End: 2026-07-30

## 2025-07-30 NOTE — TELEPHONE ENCOUNTER
Called to discuss   All options discussed  Planning on salvage radiation  Discussed role of intensfying for 6 months with ARPI vs radiation alone  All options discussed  Agreed to proceed with 6 months of abiraterone/prednisone  W/ radiation    Order sent   Working with pharmacy

## 2025-07-31 ENCOUNTER — SPECIALTY PHARMACY (OUTPATIENT)
Dept: PHARMACY | Facility: CLINIC | Age: 85
End: 2025-07-31

## 2025-07-31 PROCEDURE — RXMED WILLOW AMBULATORY MEDICATION CHARGE

## 2025-07-31 NOTE — PROGRESS NOTES
[facility] received prescription(s) Abiraterone from Dr. Ocasio's office. Benefits investigation completed and prior authorization was required.    PA was approved w/ the following information:    Plan Name: [employer] KARINA WREN Reference Number: [id number]/A  Approval Dates: 1/1/25 to 7/29/28    Patient is scheduled for delivery and set to receive their medication on Monday, 8/4/25.    Thank you!

## 2025-08-01 ENCOUNTER — PHARMACY VISIT (OUTPATIENT)
Dept: PHARMACY | Facility: CLINIC | Age: 85
End: 2025-08-01
Payer: MEDICARE

## 2025-08-04 ENCOUNTER — SPECIALTY PHARMACY (OUTPATIENT)
Dept: PHARMACY | Facility: CLINIC | Age: 85
End: 2025-08-04

## 2025-08-04 NOTE — PROGRESS NOTES
"[facility] Clinical Note  Initial Patient Education     Introduction  Dago Han \"Arcadio\" is a 84 y.o. male who is on the specialty pharmacy service for management of: Oncology Core.    Dago Han \"Arcadio\" is initiating the following therapy: abiraterone 2 tablets (1000 mg) by mouth once daily       Medication receipt date: 8/4/25    Duration of therapy: Patient/Prescriber Specific- 6 months    The most recent encounter visit with the referring prescriber Dr. Ocasio on 7/30/25 was reviewed.  Pharmacy will continue to collaborate in the care of this patient with the referring prescriber.    Clinical Background  An initial assessment was conducted prior to first fill of the medication to determine the appropriateness of therapy given the patient's diagnosis, medication list, comorbidities, allergies, medical history, patient's ability to self administer medication, and therapeutic goals based on possible outcomes of therapy. Refer to initial assessment task completed on 7/31/25.    Labs for clinical appropriateness that were reviewed include:   Oncology - CBC-diff:   Lab Results   Component Value Date    WBC 4.5 06/13/2025    RBC 3.86 (L) 06/13/2025    HGB 11.7 (L) 06/13/2025    HCT 36.5 (L) 06/13/2025    MCV 95 06/13/2025    MCHC 32.1 06/13/2025     06/13/2025    RDW 13.1 06/13/2025    NEUTOPHILPCT 62.3 06/13/2025    IGPCT 0.2 06/13/2025    LYMPHOPCT 23.1 06/13/2025    MONOPCT 10.4 06/13/2025    EOSPCT 3.3 06/13/2025    BASOPCT 0.7 06/13/2025    NEUTROABS 2.83 06/13/2025    LYMPHSABS 1.05 06/13/2025    MONOSABS 0.47 06/13/2025    EOSABS 0.15 06/13/2025    BASOSABS 0.03 06/13/2025   , CMP:   Lab Results   Component Value Date    GLUCOSE 101 (H) 06/13/2025     06/13/2025    K 4.9 06/13/2025     06/13/2025    CO2 27 06/13/2025    ANIONGAP 13 06/13/2025    BUN 33 (H) 06/13/2025    CREATININE 1.69 (H) 06/13/2025    CALCIUM 9.1 06/13/2025    ALBUMIN 4.2 " 06/13/2025    ALKPHOS 133 06/13/2025    PROT 6.7 06/13/2025    AST 18 06/13/2025    BILITOT 0.3 06/13/2025    ALT 14 06/13/2025   , PSA:   Lab Results   Component Value Date    PSA 0.91 06/13/2025   , and Testosterone:   Lab Results   Component Value Date    TESTOSTERONE <30 (L) 06/13/2025       Education/Discussion  Dago was contacted on 8/4/2025 at 2:57 PM for a pharmacy visit with encounter number [id number] from:   [facility]  [address] [address]  Dept: [phone]  Dept Fax: [phone]  Dago consented to a/an Telephone visit, which was performed.    Medication Start Date (planned or actual): 8/5/25         Education was conducted prior to start of therapy? Yes    Education discussed includes the following:  Patient Education  Counseled the Patient on the Following : Expected duration of therapy, Possible side effects and management, Doses and administration, Theraputic rationale and expected outcomes, Possible drug interactions, Lab monitoring and follow-up, Safe handling, storage, and disposal, Contraindications and precautions, Pharmacy contact information  Learner: Patient, Significant other  Education Method: Explanation  Education Response: Verbalizes understanding  Additional details of the medication specific counseling are found within the linked patient education flowsheet.     The follow up timeline was discussed. Every person responds to and reacts to therapy differently. Patient should be assessed for efficacy and tolerability in approximately: 10-14 days    Provided education on goals and possible outcomes of therapy:  Adherence with therapy  Timely completion of appropriate labs  Timely and appropriate follow up with provider  Identify and address medication interactions with presciption medications, OTC medications and supplements  Optimize or maintain quality of life  Oncology: Prolong life/No disease progression  Manage side  effects (ex: nausea/vomiting, constipation, fatigue) in conjunction with care team           The importance of adherence was discussed and they were advised to take the medication as prescribed by their provider.         Impression/Plan  Review and Assessment   Reviewed During This Encounter: Medications  Medications Assessed for Appropriate Use, Dose, Route, Frequency, and Duration: Yes  Medication Reconciliation Completed: Yes  Drug Interactions Evaluated: Yes  Clinically Relevant Drug Interactions Identified: Yes    This patient has been identified as high risk due to Geriatric (over 65 years of age).  The following action was taken: Patient/caregiver encouraged to participate in patient management program.    QOL/Patient Satisfaction  Rate your quality of life on scale of 1-10: 8  Rate your satisfaction with  Specialty Pharmacy on scale of 1-10: 10 - Completely satisfied    The  Specialty Pharmacy Welcome packet may be viewed here:   Specialty Pharmacy Welcome Packet     Or by scanning QR code:      Provided contact information ([phone]) for [facility] and reviewed dispensing process, refill timeline and patient management follow up. Advised to contact the pharmacy if there are any adverse effects and/or changes to medication list, including prescriptions, OTC medications, herbal products, or supplements. Confirmed understanding of education conducted during assessment. All questions and concerns were addressed and patient was encouraged to reach out for additional questions or concerns.      Damaris A Weiland, GlennaD

## 2025-08-06 DIAGNOSIS — N40.1 BPH ASSOCIATED WITH NOCTURIA: Primary | ICD-10-CM

## 2025-08-06 DIAGNOSIS — R35.1 BPH ASSOCIATED WITH NOCTURIA: Primary | ICD-10-CM

## 2025-08-09 LAB — BACTERIA UR CULT: NORMAL

## 2025-08-19 ENCOUNTER — ANESTHESIA EVENT (OUTPATIENT)
Dept: OPERATING ROOM | Facility: CLINIC | Age: 85
End: 2025-08-19

## 2025-08-19 RX ORDER — SODIUM CHLORIDE, SODIUM LACTATE, POTASSIUM CHLORIDE, CALCIUM CHLORIDE 600; 310; 30; 20 MG/100ML; MG/100ML; MG/100ML; MG/100ML
100 INJECTION, SOLUTION INTRAVENOUS CONTINUOUS
OUTPATIENT
Start: 2025-08-19 | End: 2025-08-19

## 2025-08-19 RX ORDER — ONDANSETRON HYDROCHLORIDE 2 MG/ML
4 INJECTION, SOLUTION INTRAVENOUS ONCE AS NEEDED
OUTPATIENT
Start: 2025-08-19

## 2025-08-19 RX ORDER — ACETAMINOPHEN 325 MG/1
650 TABLET ORAL EVERY 4 HOURS PRN
OUTPATIENT
Start: 2025-08-19

## 2025-08-19 RX ORDER — OXYCODONE HYDROCHLORIDE 5 MG/1
5 TABLET ORAL EVERY 4 HOURS PRN
Refills: 0 | OUTPATIENT
Start: 2025-08-19

## 2025-08-19 RX ORDER — FENTANYL CITRATE 50 UG/ML
50 INJECTION, SOLUTION INTRAMUSCULAR; INTRAVENOUS EVERY 5 MIN PRN
Refills: 0 | OUTPATIENT
Start: 2025-08-19

## 2025-08-20 ENCOUNTER — APPOINTMENT (OUTPATIENT)
Dept: RADIOLOGY | Facility: HOSPITAL | Age: 85
End: 2025-08-20
Payer: MEDICARE

## 2025-08-20 ENCOUNTER — HOSPITAL ENCOUNTER (OUTPATIENT)
Facility: HOSPITAL | Age: 85
Setting detail: OBSERVATION
Discharge: HOME | End: 2025-08-21
Attending: EMERGENCY MEDICINE | Admitting: INTERNAL MEDICINE
Payer: MEDICARE

## 2025-08-20 ENCOUNTER — APPOINTMENT (OUTPATIENT)
Dept: CARDIOLOGY | Facility: HOSPITAL | Age: 85
End: 2025-08-20
Payer: MEDICARE

## 2025-08-20 ENCOUNTER — RESULTS FOLLOW-UP (OUTPATIENT)
Dept: HEMATOLOGY/ONCOLOGY | Facility: CLINIC | Age: 85
End: 2025-08-20

## 2025-08-20 ENCOUNTER — TELEPHONE (OUTPATIENT)
Dept: CARDIOLOGY | Facility: CLINIC | Age: 85
End: 2025-08-20

## 2025-08-20 ENCOUNTER — ANESTHESIA (OUTPATIENT)
Dept: OPERATING ROOM | Facility: CLINIC | Age: 85
End: 2025-08-20

## 2025-08-20 DIAGNOSIS — I48.19 ATRIAL FIBRILLATION, PERSISTENT (MULTI): ICD-10-CM

## 2025-08-20 DIAGNOSIS — I25.10 CORONARY ARTERY DISEASE INVOLVING NATIVE CORONARY ARTERY OF NATIVE HEART WITHOUT ANGINA PECTORIS: ICD-10-CM

## 2025-08-20 DIAGNOSIS — R06.02 SHORTNESS OF BREATH: ICD-10-CM

## 2025-08-20 DIAGNOSIS — E78.2 MIXED HYPERLIPIDEMIA: ICD-10-CM

## 2025-08-20 DIAGNOSIS — R55 SYNCOPE, UNSPECIFIED SYNCOPE TYPE: ICD-10-CM

## 2025-08-20 DIAGNOSIS — I48.91 NEW ONSET ATRIAL FIBRILLATION (MULTI): Primary | ICD-10-CM

## 2025-08-20 DIAGNOSIS — C61 ADENOCARCINOMA OF PROSTATE (MULTI): ICD-10-CM

## 2025-08-20 DIAGNOSIS — N17.9 AKI (ACUTE KIDNEY INJURY): ICD-10-CM

## 2025-08-20 DIAGNOSIS — R79.89 ELEVATED TROPONIN: ICD-10-CM

## 2025-08-20 LAB
ALBUMIN SERPL BCP-MCNC: 4.1 G/DL (ref 3.4–5)
ALP SERPL-CCNC: 118 U/L (ref 33–136)
ALT SERPL W P-5'-P-CCNC: 13 U/L (ref 10–52)
ANION GAP SERPL CALC-SCNC: 14 MMOL/L (ref 10–20)
AST SERPL W P-5'-P-CCNC: 20 U/L (ref 9–39)
ATRIAL RATE: 0 BPM
BASOPHILS # BLD AUTO: 0.03 X10*3/UL (ref 0–0.1)
BASOPHILS NFR BLD AUTO: 0.4 %
BILIRUB SERPL-MCNC: 0.5 MG/DL (ref 0–1.2)
BUN SERPL-MCNC: 35 MG/DL (ref 6–23)
CALCIUM SERPL-MCNC: 8.9 MG/DL (ref 8.6–10.3)
CARDIAC TROPONIN I PNL SERPL HS: 27 NG/L (ref 0–20)
CARDIAC TROPONIN I PNL SERPL HS: 32 NG/L (ref 0–20)
CHLORIDE SERPL-SCNC: 107 MMOL/L (ref 98–107)
CO2 SERPL-SCNC: 25 MMOL/L (ref 21–32)
CREAT SERPL-MCNC: 2.21 MG/DL (ref 0.5–1.3)
D DIMER PPP FEU-MCNC: 803 NG/ML FEU
EGFRCR SERPLBLD CKD-EPI 2021: 29 ML/MIN/1.73M*2
EJECTION FRACTION APICAL 4 CHAMBER: 56.1
EJECTION FRACTION: 48 %
EOSINOPHIL # BLD AUTO: 0.14 X10*3/UL (ref 0–0.4)
EOSINOPHIL NFR BLD AUTO: 2 %
ERYTHROCYTE [DISTWIDTH] IN BLOOD BY AUTOMATED COUNT: 13.8 % (ref 11.5–14.5)
GLUCOSE SERPL-MCNC: 130 MG/DL (ref 74–99)
HCT VFR BLD AUTO: 33.3 % (ref 41–52)
HGB BLD-MCNC: 10.6 G/DL (ref 13.5–17.5)
IMM GRANULOCYTES # BLD AUTO: 0.02 X10*3/UL (ref 0–0.5)
IMM GRANULOCYTES NFR BLD AUTO: 0.3 % (ref 0–0.9)
LEFT ATRIUM VOLUME AREA LENGTH INDEX BSA: 28.2 ML/M2
LEFT VENTRICLE INTERNAL DIMENSION DIASTOLE: 5.8 CM (ref 3.5–6)
LV EJECTION FRACTION BIPLANE: 57 %
LYMPHOCYTES # BLD AUTO: 1.01 X10*3/UL (ref 0.8–3)
LYMPHOCYTES NFR BLD AUTO: 14.4 %
MAGNESIUM SERPL-MCNC: 2.07 MG/DL (ref 1.6–2.4)
MCH RBC QN AUTO: 29.6 PG (ref 26–34)
MCHC RBC AUTO-ENTMCNC: 31.8 G/DL (ref 32–36)
MCV RBC AUTO: 93 FL (ref 80–100)
MONOCYTES # BLD AUTO: 0.56 X10*3/UL (ref 0.05–0.8)
MONOCYTES NFR BLD AUTO: 8 %
NEUTROPHILS # BLD AUTO: 5.24 X10*3/UL (ref 1.6–5.5)
NEUTROPHILS NFR BLD AUTO: 74.9 %
NRBC BLD-RTO: 0 /100 WBCS (ref 0–0)
P AXIS: 114 DEGREES
PLATELET # BLD AUTO: 134 X10*3/UL (ref 150–450)
POTASSIUM SERPL-SCNC: 4.2 MMOL/L (ref 3.5–5.3)
PR INTERVAL: 320 MS
PROT SERPL-MCNC: 7.2 G/DL (ref 6.4–8.2)
Q ONSET: 252 MS
QRS COUNT: 14 BEATS
QRS DURATION: 115 MS
QT INTERVAL: 392 MS
QTC CALCULATION(BAZETT): 483 MS
QTC FREDERICIA: 450 MS
R AXIS: 60 DEGREES
RBC # BLD AUTO: 3.58 X10*6/UL (ref 4.5–5.9)
SODIUM SERPL-SCNC: 142 MMOL/L (ref 136–145)
T AXIS: 256 DEGREES
T OFFSET: 448 MS
TSH SERPL-ACNC: 1.44 MIU/L (ref 0.44–3.98)
VENTRICULAR RATE: 91 BPM
WBC # BLD AUTO: 7 X10*3/UL (ref 4.4–11.3)

## 2025-08-20 PROCEDURE — 2500000002 HC RX 250 W HCPCS SELF ADMINISTERED DRUGS (ALT 637 FOR MEDICARE OP, ALT 636 FOR OP/ED): Performed by: NURSE PRACTITIONER

## 2025-08-20 PROCEDURE — 93005 ELECTROCARDIOGRAM TRACING: CPT

## 2025-08-20 PROCEDURE — 96361 HYDRATE IV INFUSION ADD-ON: CPT

## 2025-08-20 PROCEDURE — 83735 ASSAY OF MAGNESIUM: CPT | Performed by: EMERGENCY MEDICINE

## 2025-08-20 PROCEDURE — 99223 1ST HOSP IP/OBS HIGH 75: CPT | Performed by: INTERNAL MEDICINE

## 2025-08-20 PROCEDURE — 93308 TTE F-UP OR LMTD: CPT | Performed by: INTERNAL MEDICINE

## 2025-08-20 PROCEDURE — C8924 2D TTE W OR W/O FOL W/CON,FU: HCPCS

## 2025-08-20 PROCEDURE — 2500000001 HC RX 250 WO HCPCS SELF ADMINISTERED DRUGS (ALT 637 FOR MEDICARE OP): Performed by: NURSE PRACTITIONER

## 2025-08-20 PROCEDURE — G0378 HOSPITAL OBSERVATION PER HR: HCPCS

## 2025-08-20 PROCEDURE — 84484 ASSAY OF TROPONIN QUANT: CPT | Performed by: NURSE PRACTITIONER

## 2025-08-20 PROCEDURE — 36415 COLL VENOUS BLD VENIPUNCTURE: CPT | Performed by: NURSE PRACTITIONER

## 2025-08-20 PROCEDURE — 99223 1ST HOSP IP/OBS HIGH 75: CPT | Performed by: NURSE PRACTITIONER

## 2025-08-20 PROCEDURE — 99285 EMERGENCY DEPT VISIT HI MDM: CPT | Mod: 25 | Performed by: EMERGENCY MEDICINE

## 2025-08-20 PROCEDURE — 2500000004 HC RX 250 GENERAL PHARMACY W/ HCPCS (ALT 636 FOR OP/ED): Performed by: NURSE PRACTITIONER

## 2025-08-20 PROCEDURE — 85025 COMPLETE CBC W/AUTO DIFF WBC: CPT | Performed by: EMERGENCY MEDICINE

## 2025-08-20 PROCEDURE — 71045 X-RAY EXAM CHEST 1 VIEW: CPT

## 2025-08-20 PROCEDURE — 2500000001 HC RX 250 WO HCPCS SELF ADMINISTERED DRUGS (ALT 637 FOR MEDICARE OP): Performed by: STUDENT IN AN ORGANIZED HEALTH CARE EDUCATION/TRAINING PROGRAM

## 2025-08-20 PROCEDURE — 84075 ASSAY ALKALINE PHOSPHATASE: CPT | Performed by: EMERGENCY MEDICINE

## 2025-08-20 PROCEDURE — 70450 CT HEAD/BRAIN W/O DYE: CPT | Performed by: STUDENT IN AN ORGANIZED HEALTH CARE EDUCATION/TRAINING PROGRAM

## 2025-08-20 PROCEDURE — 96360 HYDRATION IV INFUSION INIT: CPT | Mod: 59

## 2025-08-20 PROCEDURE — 84443 ASSAY THYROID STIM HORMONE: CPT | Performed by: NURSE PRACTITIONER

## 2025-08-20 PROCEDURE — 2500000001 HC RX 250 WO HCPCS SELF ADMINISTERED DRUGS (ALT 637 FOR MEDICARE OP): Performed by: REGISTERED NURSE

## 2025-08-20 PROCEDURE — 71045 X-RAY EXAM CHEST 1 VIEW: CPT | Mod: FOREIGN READ | Performed by: RADIOLOGY

## 2025-08-20 PROCEDURE — G0390 TRAUMA RESPONS W/HOSP CRITI: HCPCS

## 2025-08-20 PROCEDURE — 85379 FIBRIN DEGRADATION QUANT: CPT | Performed by: NURSE PRACTITIONER

## 2025-08-20 PROCEDURE — 72125 CT NECK SPINE W/O DYE: CPT | Performed by: STUDENT IN AN ORGANIZED HEALTH CARE EDUCATION/TRAINING PROGRAM

## 2025-08-20 PROCEDURE — 72125 CT NECK SPINE W/O DYE: CPT

## 2025-08-20 PROCEDURE — 70450 CT HEAD/BRAIN W/O DYE: CPT

## 2025-08-20 RX ORDER — PANTOPRAZOLE SODIUM 40 MG/1
40 TABLET, DELAYED RELEASE ORAL
Status: DISCONTINUED | OUTPATIENT
Start: 2025-08-21 | End: 2025-08-21 | Stop reason: HOSPADM

## 2025-08-20 RX ORDER — TAMSULOSIN HYDROCHLORIDE 0.4 MG/1
0.4 CAPSULE ORAL DAILY
Status: DISCONTINUED | OUTPATIENT
Start: 2025-08-20 | End: 2025-08-21 | Stop reason: HOSPADM

## 2025-08-20 RX ORDER — PREDNISONE 5 MG/1
5 TABLET ORAL DAILY
Status: DISCONTINUED | OUTPATIENT
Start: 2025-08-20 | End: 2025-08-21 | Stop reason: HOSPADM

## 2025-08-20 RX ORDER — ACETAMINOPHEN 325 MG/1
650 TABLET ORAL EVERY 6 HOURS PRN
Status: DISCONTINUED | OUTPATIENT
Start: 2025-08-20 | End: 2025-08-21 | Stop reason: HOSPADM

## 2025-08-20 RX ORDER — ONDANSETRON 4 MG/1
4 TABLET, FILM COATED ORAL EVERY 8 HOURS PRN
Status: DISCONTINUED | OUTPATIENT
Start: 2025-08-20 | End: 2025-08-21 | Stop reason: HOSPADM

## 2025-08-20 RX ORDER — GABAPENTIN 300 MG/1
300 CAPSULE ORAL 2 TIMES DAILY
Status: DISCONTINUED | OUTPATIENT
Start: 2025-08-20 | End: 2025-08-21 | Stop reason: HOSPADM

## 2025-08-20 RX ORDER — POLYETHYLENE GLYCOL 3350 17 G/17G
17 POWDER, FOR SOLUTION ORAL DAILY
Status: DISCONTINUED | OUTPATIENT
Start: 2025-08-20 | End: 2025-08-21 | Stop reason: HOSPADM

## 2025-08-20 RX ORDER — ATORVASTATIN CALCIUM 10 MG/1
10 TABLET, FILM COATED ORAL NIGHTLY
Status: DISCONTINUED | OUTPATIENT
Start: 2025-08-20 | End: 2025-08-21 | Stop reason: HOSPADM

## 2025-08-20 RX ORDER — ASPIRIN 81 MG/1
81 TABLET ORAL DAILY
Status: DISCONTINUED | OUTPATIENT
Start: 2025-08-20 | End: 2025-08-21 | Stop reason: HOSPADM

## 2025-08-20 RX ORDER — GUAIFENESIN 600 MG/1
600 TABLET, EXTENDED RELEASE ORAL EVERY 12 HOURS PRN
Status: DISCONTINUED | OUTPATIENT
Start: 2025-08-20 | End: 2025-08-21 | Stop reason: HOSPADM

## 2025-08-20 RX ORDER — BISACODYL 5 MG
10 TABLET, DELAYED RELEASE (ENTERIC COATED) ORAL DAILY PRN
Status: DISCONTINUED | OUTPATIENT
Start: 2025-08-20 | End: 2025-08-21 | Stop reason: HOSPADM

## 2025-08-20 RX ORDER — SODIUM CHLORIDE, SODIUM LACTATE, POTASSIUM CHLORIDE, CALCIUM CHLORIDE 600; 310; 30; 20 MG/100ML; MG/100ML; MG/100ML; MG/100ML
100 INJECTION, SOLUTION INTRAVENOUS CONTINUOUS
Status: ACTIVE | OUTPATIENT
Start: 2025-08-20 | End: 2025-08-21

## 2025-08-20 RX ORDER — BUTALBITAL, ASPIRIN AND CAFFEINE 50; 325; 40 MG/1; MG/1; MG/1
1 TABLET ORAL
COMMUNITY
End: 2025-08-21 | Stop reason: HOSPADM

## 2025-08-20 RX ORDER — METOPROLOL TARTRATE 25 MG/1
25 TABLET, FILM COATED ORAL 2 TIMES DAILY
Status: CANCELLED | OUTPATIENT
Start: 2025-08-20

## 2025-08-20 RX ORDER — METOPROLOL SUCCINATE 25 MG/1
25 TABLET, EXTENDED RELEASE ORAL DAILY
Status: DISCONTINUED | OUTPATIENT
Start: 2025-08-20 | End: 2025-08-21 | Stop reason: HOSPADM

## 2025-08-20 RX ORDER — ICOSAPENT ETHYL 1 G/1
2 CAPSULE ORAL
Status: DISCONTINUED | OUTPATIENT
Start: 2025-08-20 | End: 2025-08-21 | Stop reason: HOSPADM

## 2025-08-20 RX ORDER — DAPAGLIFLOZIN 5 MG/1
10 TABLET, FILM COATED ORAL DAILY
Status: DISCONTINUED | OUTPATIENT
Start: 2025-08-20 | End: 2025-08-21 | Stop reason: HOSPADM

## 2025-08-20 RX ORDER — ONDANSETRON HYDROCHLORIDE 2 MG/ML
4 INJECTION, SOLUTION INTRAVENOUS EVERY 8 HOURS PRN
Status: DISCONTINUED | OUTPATIENT
Start: 2025-08-20 | End: 2025-08-21 | Stop reason: HOSPADM

## 2025-08-20 RX ORDER — CLOPIDOGREL BISULFATE 75 MG/1
75 TABLET ORAL DAILY
Status: DISCONTINUED | OUTPATIENT
Start: 2025-08-20 | End: 2025-08-21 | Stop reason: HOSPADM

## 2025-08-20 RX ORDER — ABIRATERONE 500 MG/1
1000 TABLET ORAL DAILY
Status: DISCONTINUED | OUTPATIENT
Start: 2025-08-20 | End: 2025-08-21 | Stop reason: HOSPADM

## 2025-08-20 RX ORDER — PANTOPRAZOLE SODIUM 40 MG/10ML
40 INJECTION, POWDER, LYOPHILIZED, FOR SOLUTION INTRAVENOUS
Status: DISCONTINUED | OUTPATIENT
Start: 2025-08-21 | End: 2025-08-21 | Stop reason: HOSPADM

## 2025-08-20 RX ORDER — HEPARIN SODIUM 5000 [USP'U]/ML
5000 INJECTION, SOLUTION INTRAVENOUS; SUBCUTANEOUS EVERY 8 HOURS SCHEDULED
Status: DISCONTINUED | OUTPATIENT
Start: 2025-08-20 | End: 2025-08-20

## 2025-08-20 RX ORDER — TALC
3 POWDER (GRAM) TOPICAL NIGHTLY PRN
Status: DISCONTINUED | OUTPATIENT
Start: 2025-08-20 | End: 2025-08-21 | Stop reason: HOSPADM

## 2025-08-20 RX ADMIN — ACETAMINOPHEN 650 MG: 325 TABLET ORAL at 23:19

## 2025-08-20 RX ADMIN — ICOSAPENT ETHYL 2 G: 1000 CAPSULE ORAL at 20:16

## 2025-08-20 RX ADMIN — SODIUM CHLORIDE, POTASSIUM CHLORIDE, SODIUM LACTATE AND CALCIUM CHLORIDE 100 ML/HR: 600; 310; 30; 20 INJECTION, SOLUTION INTRAVENOUS at 10:50

## 2025-08-20 RX ADMIN — CLOPIDOGREL 75 MG: 75 TABLET ORAL at 10:47

## 2025-08-20 RX ADMIN — PREDNISONE 5 MG: 5 TABLET ORAL at 10:36

## 2025-08-20 RX ADMIN — GABAPENTIN 300 MG: 300 CAPSULE ORAL at 10:47

## 2025-08-20 RX ADMIN — SODIUM CHLORIDE 500 ML: 0.9 INJECTION, SOLUTION INTRAVENOUS at 08:27

## 2025-08-20 RX ADMIN — METOPROLOL SUCCINATE 25 MG: 25 TABLET, EXTENDED RELEASE ORAL at 10:47

## 2025-08-20 RX ADMIN — TAMSULOSIN HYDROCHLORIDE 0.4 MG: 0.4 CAPSULE ORAL at 15:58

## 2025-08-20 RX ADMIN — GABAPENTIN 300 MG: 300 CAPSULE ORAL at 20:16

## 2025-08-20 RX ADMIN — SODIUM CHLORIDE, POTASSIUM CHLORIDE, SODIUM LACTATE AND CALCIUM CHLORIDE 100 ML/HR: 600; 310; 30; 20 INJECTION, SOLUTION INTRAVENOUS at 22:27

## 2025-08-20 RX ADMIN — HUMAN ALBUMIN MICROSPHERES AND PERFLUTREN 0.5 ML: 10; .22 INJECTION, SOLUTION INTRAVENOUS at 11:47

## 2025-08-20 RX ADMIN — APIXABAN 2.5 MG: 2.5 TABLET, FILM COATED ORAL at 22:35

## 2025-08-20 RX ADMIN — ASPIRIN 81 MG: 81 TABLET, DELAYED RELEASE ORAL at 10:50

## 2025-08-20 SDOH — SOCIAL STABILITY: SOCIAL INSECURITY
WITHIN THE LAST YEAR, HAVE YOU BEEN RAPED OR FORCED TO HAVE ANY KIND OF SEXUAL ACTIVITY BY YOUR PARTNER OR EX-PARTNER?: NO

## 2025-08-20 SDOH — ECONOMIC STABILITY: INCOME INSECURITY: IN THE PAST 12 MONTHS HAS THE ELECTRIC, GAS, OIL, OR WATER COMPANY THREATENED TO SHUT OFF SERVICES IN YOUR HOME?: NO

## 2025-08-20 SDOH — SOCIAL STABILITY: SOCIAL INSECURITY: WITHIN THE LAST YEAR, HAVE YOU BEEN AFRAID OF YOUR PARTNER OR EX-PARTNER?: NO

## 2025-08-20 SDOH — HEALTH STABILITY: MENTAL HEALTH
DO YOU FEEL STRESS - TENSE, RESTLESS, NERVOUS, OR ANXIOUS, OR UNABLE TO SLEEP AT NIGHT BECAUSE YOUR MIND IS TROUBLED ALL THE TIME - THESE DAYS?: NOT AT ALL

## 2025-08-20 SDOH — HEALTH STABILITY: PHYSICAL HEALTH: ON AVERAGE, HOW MANY DAYS PER WEEK DO YOU ENGAGE IN MODERATE TO STRENUOUS EXERCISE (LIKE A BRISK WALK)?: 0 DAYS

## 2025-08-20 SDOH — SOCIAL STABILITY: SOCIAL INSECURITY: HAVE YOU HAD THOUGHTS OF HARMING ANYONE ELSE?: NO

## 2025-08-20 SDOH — SOCIAL STABILITY: SOCIAL INSECURITY
WITHIN THE LAST YEAR, HAVE YOU BEEN KICKED, HIT, SLAPPED, OR OTHERWISE PHYSICALLY HURT BY YOUR PARTNER OR EX-PARTNER?: NO

## 2025-08-20 SDOH — HEALTH STABILITY: PHYSICAL HEALTH: ON AVERAGE, HOW MANY MINUTES DO YOU ENGAGE IN EXERCISE AT THIS LEVEL?: 0 MIN

## 2025-08-20 SDOH — ECONOMIC STABILITY: FOOD INSECURITY: WITHIN THE PAST 12 MONTHS, THE FOOD YOU BOUGHT JUST DIDN'T LAST AND YOU DIDN'T HAVE MONEY TO GET MORE.: NEVER TRUE

## 2025-08-20 SDOH — ECONOMIC STABILITY: HOUSING INSECURITY: AT ANY TIME IN THE PAST 12 MONTHS, WERE YOU HOMELESS OR LIVING IN A SHELTER (INCLUDING NOW)?: NO

## 2025-08-20 SDOH — ECONOMIC STABILITY: HOUSING INSECURITY: IN THE PAST 12 MONTHS, HOW MANY TIMES HAVE YOU MOVED WHERE YOU WERE LIVING?: 0

## 2025-08-20 SDOH — SOCIAL STABILITY: SOCIAL INSECURITY: WITHIN THE LAST YEAR, HAVE YOU BEEN HUMILIATED OR EMOTIONALLY ABUSED IN OTHER WAYS BY YOUR PARTNER OR EX-PARTNER?: NO

## 2025-08-20 SDOH — ECONOMIC STABILITY: HOUSING INSECURITY: IN THE LAST 12 MONTHS, WAS THERE A TIME WHEN YOU WERE NOT ABLE TO PAY THE MORTGAGE OR RENT ON TIME?: NO

## 2025-08-20 SDOH — ECONOMIC STABILITY: FOOD INSECURITY: HOW HARD IS IT FOR YOU TO PAY FOR THE VERY BASICS LIKE FOOD, HOUSING, MEDICAL CARE, AND HEATING?: NOT HARD AT ALL

## 2025-08-20 SDOH — ECONOMIC STABILITY: FOOD INSECURITY: WITHIN THE PAST 12 MONTHS, YOU WORRIED THAT YOUR FOOD WOULD RUN OUT BEFORE YOU GOT THE MONEY TO BUY MORE.: NEVER TRUE

## 2025-08-20 SDOH — SOCIAL STABILITY: SOCIAL INSECURITY: ABUSE: ADULT

## 2025-08-20 SDOH — SOCIAL STABILITY: SOCIAL INSECURITY: WERE YOU ABLE TO COMPLETE ALL THE BEHAVIORAL HEALTH SCREENINGS?: YES

## 2025-08-20 SDOH — ECONOMIC STABILITY: TRANSPORTATION INSECURITY: IN THE PAST 12 MONTHS, HAS LACK OF TRANSPORTATION KEPT YOU FROM MEDICAL APPOINTMENTS OR FROM GETTING MEDICATIONS?: NO

## 2025-08-20 ASSESSMENT — COGNITIVE AND FUNCTIONAL STATUS - GENERAL
MOBILITY SCORE: 24
DAILY ACTIVITIY SCORE: 24
PATIENT BASELINE BEDBOUND: NO

## 2025-08-20 ASSESSMENT — ACTIVITIES OF DAILY LIVING (ADL)
BATHING: INDEPENDENT
TOILETING: INDEPENDENT
WALKS IN HOME: INDEPENDENT
JUDGMENT_ADEQUATE_SAFELY_COMPLETE_DAILY_ACTIVITIES: YES
HEARING - RIGHT EAR: FUNCTIONAL
PATIENT'S MEMORY ADEQUATE TO SAFELY COMPLETE DAILY ACTIVITIES?: YES
DRESSING YOURSELF: INDEPENDENT
FEEDING YOURSELF: INDEPENDENT
LACK_OF_TRANSPORTATION: NO
GROOMING: INDEPENDENT
ADEQUATE_TO_COMPLETE_ADL: YES
HEARING - LEFT EAR: FUNCTIONAL

## 2025-08-20 ASSESSMENT — LIFESTYLE VARIABLES
HOW OFTEN DO YOU HAVE 6 OR MORE DRINKS ON ONE OCCASION: NEVER
HAVE YOU EVER FELT YOU SHOULD CUT DOWN ON YOUR DRINKING: NO
HAVE PEOPLE ANNOYED YOU BY CRITICIZING YOUR DRINKING: NO
AUDIT-C TOTAL SCORE: 0
HOW OFTEN DO YOU HAVE A DRINK CONTAINING ALCOHOL: NEVER
SKIP TO QUESTIONS 9-10: 1
EVER FELT BAD OR GUILTY ABOUT YOUR DRINKING: NO
HOW MANY STANDARD DRINKS CONTAINING ALCOHOL DO YOU HAVE ON A TYPICAL DAY: PATIENT DOES NOT DRINK
TOTAL SCORE: 0
AUDIT-C TOTAL SCORE: 0
EVER HAD A DRINK FIRST THING IN THE MORNING TO STEADY YOUR NERVES TO GET RID OF A HANGOVER: NO

## 2025-08-20 ASSESSMENT — PATIENT HEALTH QUESTIONNAIRE - PHQ9
SUM OF ALL RESPONSES TO PHQ9 QUESTIONS 1 & 2: 0
2. FEELING DOWN, DEPRESSED OR HOPELESS: NOT AT ALL
1. LITTLE INTEREST OR PLEASURE IN DOING THINGS: NOT AT ALL

## 2025-08-20 ASSESSMENT — PAIN SCALES - GENERAL
PAINLEVEL_OUTOF10: 0 - NO PAIN

## 2025-08-20 ASSESSMENT — PAIN - FUNCTIONAL ASSESSMENT
PAIN_FUNCTIONAL_ASSESSMENT: 0-10

## 2025-08-21 ENCOUNTER — TELEPHONE (OUTPATIENT)
Dept: CARDIOLOGY | Facility: CLINIC | Age: 85
End: 2025-08-21
Payer: MEDICARE

## 2025-08-21 ENCOUNTER — PHARMACY VISIT (OUTPATIENT)
Dept: PHARMACY | Facility: CLINIC | Age: 85
End: 2025-08-21
Payer: MEDICARE

## 2025-08-21 VITALS
HEIGHT: 70 IN | DIASTOLIC BLOOD PRESSURE: 78 MMHG | OXYGEN SATURATION: 94 % | BODY MASS INDEX: 30.33 KG/M2 | SYSTOLIC BLOOD PRESSURE: 122 MMHG | TEMPERATURE: 97.6 F | WEIGHT: 211.86 LBS | RESPIRATION RATE: 17 BRPM | HEART RATE: 79 BPM

## 2025-08-21 PROCEDURE — 2500000002 HC RX 250 W HCPCS SELF ADMINISTERED DRUGS (ALT 637 FOR MEDICARE OP, ALT 636 FOR OP/ED): Performed by: NURSE PRACTITIONER

## 2025-08-21 PROCEDURE — G0378 HOSPITAL OBSERVATION PER HR: HCPCS

## 2025-08-21 PROCEDURE — 97165 OT EVAL LOW COMPLEX 30 MIN: CPT | Mod: GO

## 2025-08-21 PROCEDURE — 2500000001 HC RX 250 WO HCPCS SELF ADMINISTERED DRUGS (ALT 637 FOR MEDICARE OP): Performed by: STUDENT IN AN ORGANIZED HEALTH CARE EDUCATION/TRAINING PROGRAM

## 2025-08-21 PROCEDURE — RXMED WILLOW AMBULATORY MEDICATION CHARGE

## 2025-08-21 PROCEDURE — 2500000001 HC RX 250 WO HCPCS SELF ADMINISTERED DRUGS (ALT 637 FOR MEDICARE OP): Performed by: NURSE PRACTITIONER

## 2025-08-21 PROCEDURE — 99232 SBSQ HOSP IP/OBS MODERATE 35: CPT | Performed by: NURSE PRACTITIONER

## 2025-08-21 PROCEDURE — 99239 HOSP IP/OBS DSCHRG MGMT >30: CPT | Performed by: FAMILY MEDICINE

## 2025-08-21 PROCEDURE — 2500000004 HC RX 250 GENERAL PHARMACY W/ HCPCS (ALT 636 FOR OP/ED): Performed by: NURSE PRACTITIONER

## 2025-08-21 RX ORDER — METOPROLOL SUCCINATE 25 MG/1
25 TABLET, EXTENDED RELEASE ORAL DAILY
Qty: 30 TABLET | Refills: 0 | Status: SHIPPED | OUTPATIENT
Start: 2025-08-22 | End: 2025-09-21

## 2025-08-21 RX ADMIN — ICOSAPENT ETHYL 2 G: 1000 CAPSULE ORAL at 08:14

## 2025-08-21 RX ADMIN — METOPROLOL SUCCINATE 25 MG: 25 TABLET, EXTENDED RELEASE ORAL at 08:14

## 2025-08-21 RX ADMIN — CLOPIDOGREL 75 MG: 75 TABLET ORAL at 08:13

## 2025-08-21 RX ADMIN — TAMSULOSIN HYDROCHLORIDE 0.4 MG: 0.4 CAPSULE ORAL at 08:12

## 2025-08-21 RX ADMIN — PREDNISONE 5 MG: 5 TABLET ORAL at 08:13

## 2025-08-21 RX ADMIN — GABAPENTIN 300 MG: 300 CAPSULE ORAL at 08:15

## 2025-08-21 RX ADMIN — ASPIRIN 81 MG: 81 TABLET, DELAYED RELEASE ORAL at 08:13

## 2025-08-21 RX ADMIN — APIXABAN 2.5 MG: 2.5 TABLET, FILM COATED ORAL at 08:12

## 2025-08-21 ASSESSMENT — COGNITIVE AND FUNCTIONAL STATUS - GENERAL
DAILY ACTIVITIY SCORE: 24
DRESSING REGULAR UPPER BODY CLOTHING: A LITTLE
MOBILITY SCORE: 24
HELP NEEDED FOR BATHING: A LITTLE
DRESSING REGULAR LOWER BODY CLOTHING: A LITTLE
TOILETING: A LITTLE
DAILY ACTIVITIY SCORE: 20

## 2025-08-21 ASSESSMENT — PAIN - FUNCTIONAL ASSESSMENT
PAIN_FUNCTIONAL_ASSESSMENT: 0-10

## 2025-08-21 ASSESSMENT — ENCOUNTER SYMPTOMS
GASTROINTESTINAL NEGATIVE: 1
MEMORY LOSS: 0
COUGH: 0
DECREASED APPETITE: 0
FOCAL WEAKNESS: 0
RESPIRATORY NEGATIVE: 1
ORTHOPNEA: 0
SHORTNESS OF BREATH: 0
IRREGULAR HEARTBEAT: 0
LIGHT-HEADEDNESS: 1
FALLS: 1
DYSPNEA ON EXERTION: 0
BLURRED VISION: 0
PALPITATIONS: 0
DEPRESSION: 0

## 2025-08-21 ASSESSMENT — PAIN SCALES - GENERAL
PAINLEVEL_OUTOF10: 0 - NO PAIN

## 2025-08-21 ASSESSMENT — ACTIVITIES OF DAILY LIVING (ADL)
LACK_OF_TRANSPORTATION: NO
ADL_ASSISTANCE: INDEPENDENT
BATHING_ASSISTANCE: MINIMAL

## 2025-08-25 LAB
ATRIAL RATE: 0 BPM
P AXIS: 114 DEGREES
PR INTERVAL: 320 MS
Q ONSET: 252 MS
QRS COUNT: 14 BEATS
QRS DURATION: 115 MS
QT INTERVAL: 392 MS
QTC CALCULATION(BAZETT): 483 MS
QTC FREDERICIA: 450 MS
R AXIS: 60 DEGREES
T AXIS: 256 DEGREES
T OFFSET: 448 MS
VENTRICULAR RATE: 91 BPM

## 2025-08-26 ENCOUNTER — TELEPHONE (OUTPATIENT)
Dept: RADIATION ONCOLOGY | Facility: HOSPITAL | Age: 85
End: 2025-08-26
Payer: MEDICARE

## 2025-08-28 ENCOUNTER — OFFICE VISIT (OUTPATIENT)
Dept: CARDIOLOGY | Facility: CLINIC | Age: 85
End: 2025-08-28
Payer: MEDICARE

## 2025-08-28 ENCOUNTER — HOSPITAL ENCOUNTER (OUTPATIENT)
Dept: CARDIOLOGY | Facility: CLINIC | Age: 85
Discharge: HOME | End: 2025-08-28
Payer: MEDICARE

## 2025-08-28 VITALS
DIASTOLIC BLOOD PRESSURE: 62 MMHG | WEIGHT: 197 LBS | BODY MASS INDEX: 28.27 KG/M2 | SYSTOLIC BLOOD PRESSURE: 138 MMHG | HEART RATE: 65 BPM

## 2025-08-28 DIAGNOSIS — I63.9 CEREBROVASCULAR ACCIDENT (CVA), UNSPECIFIED MECHANISM (MULTI): ICD-10-CM

## 2025-08-28 DIAGNOSIS — I50.32 CHRONIC DIASTOLIC HEART FAILURE: ICD-10-CM

## 2025-08-28 DIAGNOSIS — I48.19 PERSISTENT ATRIAL FIBRILLATION (MULTI): Primary | ICD-10-CM

## 2025-08-28 DIAGNOSIS — I36.1 NONRHEUMATIC TRICUSPID VALVE REGURGITATION: ICD-10-CM

## 2025-08-28 DIAGNOSIS — E78.2 MIXED HYPERLIPIDEMIA: ICD-10-CM

## 2025-08-28 DIAGNOSIS — I25.10 CORONARY ARTERY DISEASE INVOLVING NATIVE CORONARY ARTERY OF NATIVE HEART WITHOUT ANGINA PECTORIS: ICD-10-CM

## 2025-08-28 DIAGNOSIS — E78.1 HYPERTRIGLYCERIDEMIA: ICD-10-CM

## 2025-08-28 DIAGNOSIS — I48.19 PERSISTENT ATRIAL FIBRILLATION (MULTI): ICD-10-CM

## 2025-08-28 DIAGNOSIS — R55 SYNCOPE AND COLLAPSE: ICD-10-CM

## 2025-08-28 DIAGNOSIS — I10 ESSENTIAL HYPERTENSION: ICD-10-CM

## 2025-08-28 LAB
ATRIAL RATE: 115 BPM
Q ONSET: 218 MS
QRS COUNT: 14 BEATS
QRS DURATION: 96 MS
QT INTERVAL: 392 MS
QTC CALCULATION(BAZETT): 476 MS
QTC FREDERICIA: 446 MS
R AXIS: 97 DEGREES
T AXIS: -64 DEGREES
T OFFSET: 414 MS
VENTRICULAR RATE: 89 BPM

## 2025-08-28 PROCEDURE — 1036F TOBACCO NON-USER: CPT | Performed by: NURSE PRACTITIONER

## 2025-08-28 PROCEDURE — G2212 PROLONG OUTPT/OFFICE VIS: HCPCS | Performed by: NURSE PRACTITIONER

## 2025-08-28 PROCEDURE — 99212 OFFICE O/P EST SF 10 MIN: CPT

## 2025-08-28 PROCEDURE — 1160F RVW MEDS BY RX/DR IN RCRD: CPT | Performed by: NURSE PRACTITIONER

## 2025-08-28 PROCEDURE — 3075F SYST BP GE 130 - 139MM HG: CPT | Performed by: NURSE PRACTITIONER

## 2025-08-28 PROCEDURE — 3078F DIAST BP <80 MM HG: CPT | Performed by: NURSE PRACTITIONER

## 2025-08-28 PROCEDURE — 1159F MED LIST DOCD IN RCRD: CPT | Performed by: NURSE PRACTITIONER

## 2025-08-28 PROCEDURE — 93246 EXT ECG>7D<15D RECORDING: CPT

## 2025-08-28 PROCEDURE — 93005 ELECTROCARDIOGRAM TRACING: CPT | Performed by: NURSE PRACTITIONER

## 2025-08-28 PROCEDURE — 99215 OFFICE O/P EST HI 40 MIN: CPT | Performed by: NURSE PRACTITIONER

## 2025-08-28 RX ORDER — METOPROLOL SUCCINATE 25 MG/1
25 TABLET, EXTENDED RELEASE ORAL DAILY
Qty: 90 TABLET | Refills: 3 | Status: SHIPPED | OUTPATIENT
Start: 2025-08-28 | End: 2026-08-28

## 2025-08-29 ENCOUNTER — TELEPHONE (OUTPATIENT)
Dept: HEMATOLOGY/ONCOLOGY | Facility: CLINIC | Age: 85
End: 2025-08-29
Payer: MEDICARE

## 2025-09-02 DIAGNOSIS — E78.2 MIXED HYPERLIPIDEMIA: ICD-10-CM

## 2025-09-02 RX ORDER — PITAVASTATIN CALCIUM 2.09 MG/1
1 TABLET, FILM COATED ORAL DAILY
Qty: 90 TABLET | Refills: 2 | Status: SHIPPED | OUTPATIENT
Start: 2025-09-02

## 2025-09-03 ENCOUNTER — HOSPITAL ENCOUNTER (OUTPATIENT)
Dept: VASCULAR MEDICINE | Facility: HOSPITAL | Age: 85
Discharge: HOME | End: 2025-09-03
Payer: MEDICARE

## 2025-09-03 DIAGNOSIS — I65.23 CAROTID STENOSIS, BILATERAL: ICD-10-CM

## 2025-09-03 DIAGNOSIS — C61 ADENOCARCINOMA OF PROSTATE (MULTI): ICD-10-CM

## 2025-09-03 DIAGNOSIS — D64.9 ANEMIA, UNSPECIFIED TYPE: ICD-10-CM

## 2025-09-03 PROCEDURE — 93880 EXTRACRANIAL BILAT STUDY: CPT

## 2025-09-03 PROCEDURE — 93880 EXTRACRANIAL BILAT STUDY: CPT | Performed by: SURGERY

## 2025-09-04 ENCOUNTER — TELEPHONE (OUTPATIENT)
Dept: UROLOGY | Facility: CLINIC | Age: 85
End: 2025-09-04

## 2025-09-04 LAB
PSA SERPL-MCNC: 1.06 NG/ML
TESTOST SERPL-MCNC: <30 NG/DL (ref 240–1000)
TSH SERPL-ACNC: 0.78 MIU/L (ref 0.44–3.98)

## 2025-09-04 PROCEDURE — 36415 COLL VENOUS BLD VENIPUNCTURE: CPT

## 2025-09-16 ENCOUNTER — APPOINTMENT (OUTPATIENT)
Dept: CARDIOLOGY | Facility: CLINIC | Age: 85
End: 2025-09-16
Payer: MEDICARE

## 2025-09-16 ENCOUNTER — APPOINTMENT (OUTPATIENT)
Dept: HEMATOLOGY/ONCOLOGY | Facility: CLINIC | Age: 85
End: 2025-09-16
Payer: MEDICARE

## 2025-12-02 ENCOUNTER — APPOINTMENT (OUTPATIENT)
Dept: NEPHROLOGY | Facility: CLINIC | Age: 85
End: 2025-12-02
Payer: MEDICARE

## (undated) DEVICE — APPLICATOR, CHLORAPREP, W/ORANGE TINT, 26ML

## (undated) DEVICE — BANDAGE, ELASTIC, ACE, SELF-CLOSURE, 2 IN X 5 YD, STERILE

## (undated) DEVICE — DRESSING, GAUZE, FLUFF, 1 PLY, 18 X 36 IN

## (undated) DEVICE — GLOVE, SURGICAL, PROTEXIS PI BLUE W/NEUTHERA, 8.0, PF, LF

## (undated) DEVICE — SUTURE, VICRYL 3-0, PRECISION POINT, PS-2 UNDYED 27 INCH

## (undated) DEVICE — Device

## (undated) DEVICE — COVER, TABLE, 44X90

## (undated) DEVICE — CONTAINER, SPECIMEN, 120 ML, STERILE

## (undated) DEVICE — KIT, NERVE ROOT BLOCK, SPINAL, 22 G X 6 IN

## (undated) DEVICE — SYRINGE, 10 CC, LUER LOCK

## (undated) DEVICE — LABELS, OR GENERAL, W/MARKER

## (undated) DEVICE — GLOVE, SURGICAL, PROTEXIS PI , 7.5, PF, LF

## (undated) DEVICE — GOWN, ASTOUND, XL

## (undated) DEVICE — PADDING, WEBRIL, UNDERCAST, STERILE, 2IN

## (undated) DEVICE — TOWEL, SURGICAL, NEURO, O/R, 16 X 26, BLUE, STERILE

## (undated) DEVICE — SYRINGE, 60 CC, IRRIGATION, TOOMEY TIP

## (undated) DEVICE — SUTURE, MONOCRYL, 4-0, 18 IN, PS2, UNDYED

## (undated) DEVICE — ADHESIVE, SKIN, DERMABOND ADVANCED, 15CM, PEN-STYLE

## (undated) DEVICE — DRESSING, ISLAND, TELFA, 4 X 5 IN

## (undated) DEVICE — BLADE, ARTHRO-LOK, MINI-MENISCUS, FLAT, 4 MM

## (undated) DEVICE — DRESSING, ABDOMINAL PAD, CURITY, 8 X 10 IN